# Patient Record
Sex: FEMALE | Race: BLACK OR AFRICAN AMERICAN | Employment: FULL TIME | URBAN - METROPOLITAN AREA
[De-identification: names, ages, dates, MRNs, and addresses within clinical notes are randomized per-mention and may not be internally consistent; named-entity substitution may affect disease eponyms.]

---

## 2017-07-26 ENCOUNTER — OFFICE VISIT (OUTPATIENT)
Dept: OBGYN | Age: 37
End: 2017-07-26

## 2017-07-26 VITALS
SYSTOLIC BLOOD PRESSURE: 100 MMHG | HEART RATE: 84 BPM | HEIGHT: 62 IN | WEIGHT: 200 LBS | BODY MASS INDEX: 36.8 KG/M2 | DIASTOLIC BLOOD PRESSURE: 72 MMHG

## 2017-07-26 DIAGNOSIS — N32.81 OAB (OVERACTIVE BLADDER): ICD-10-CM

## 2017-07-26 DIAGNOSIS — N94.10 DYSPAREUNIA IN FEMALE: Primary | ICD-10-CM

## 2017-07-26 LAB
BACTERIA: NORMAL /HPF
BILIRUBIN URINE: NEGATIVE
BLOOD, URINE: ABNORMAL
CLARITY: CLEAR
COLOR: YELLOW
EPITHELIAL CELLS, UA: NORMAL /HPF
GLUCOSE URINE: NEGATIVE MG/DL
KETONES, URINE: NEGATIVE MG/DL
LEUKOCYTE ESTERASE, URINE: NEGATIVE
MUCUS: PRESENT
NITRITE, URINE: NEGATIVE
PH UA: 5 (ref 5–9)
PROTEIN UA: NEGATIVE MG/DL
RBC UA: NORMAL /HPF (ref 0–2)
SPECIFIC GRAVITY UA: 1.02 (ref 1–1.03)
UROBILINOGEN, URINE: 0.2 E.U./DL
WBC UA: NORMAL /HPF (ref 0–5)

## 2017-07-26 PROCEDURE — 99213 OFFICE O/P EST LOW 20 MIN: CPT | Performed by: OBSTETRICS & GYNECOLOGY

## 2017-07-26 RX ORDER — OXYBUTYNIN CHLORIDE 5 MG/1
5 TABLET, EXTENDED RELEASE ORAL DAILY
Qty: 30 TABLET | Refills: 1 | Status: SHIPPED | OUTPATIENT
Start: 2017-07-26 | End: 2017-12-22 | Stop reason: ALTCHOICE

## 2017-07-28 LAB — URINE CULTURE, ROUTINE: NORMAL

## 2017-08-03 RX ORDER — METRONIDAZOLE 500 MG/1
500 TABLET ORAL 2 TIMES DAILY
Qty: 14 TABLET | Refills: 0 | Status: SHIPPED | OUTPATIENT
Start: 2017-08-03 | End: 2017-08-10

## 2017-08-14 RX ORDER — METRONIDAZOLE 7.5 MG/G
GEL VAGINAL
Qty: 1 TUBE | Refills: 1 | Status: SHIPPED | OUTPATIENT
Start: 2017-08-14 | End: 2017-08-21

## 2018-01-19 DIAGNOSIS — E11.9 TYPE 2 DIABETES MELLITUS WITHOUT COMPLICATION, WITHOUT LONG-TERM CURRENT USE OF INSULIN (HCC): ICD-10-CM

## 2018-01-19 PROBLEM — G44.52 NEW DAILY PERSISTENT HEADACHE: Chronic | Status: ACTIVE | Noted: 2018-01-19

## 2018-01-19 LAB
ALBUMIN SERPL-MCNC: 4.1 G/DL (ref 3.9–4.9)
ALP BLD-CCNC: 79 U/L (ref 40–130)
ALT SERPL-CCNC: 22 U/L (ref 0–33)
ANION GAP SERPL CALCULATED.3IONS-SCNC: 13 MEQ/L (ref 7–13)
AST SERPL-CCNC: 16 U/L (ref 0–35)
BASOPHILS ABSOLUTE: 0.1 K/UL (ref 0–0.2)
BASOPHILS RELATIVE PERCENT: 1.1 %
BILIRUB SERPL-MCNC: 0.4 MG/DL (ref 0–1.2)
BUN BLDV-MCNC: 10 MG/DL (ref 6–20)
CALCIUM SERPL-MCNC: 9 MG/DL (ref 8.6–10.2)
CHLORIDE BLD-SCNC: 103 MEQ/L (ref 98–107)
CHOLESTEROL, TOTAL: 204 MG/DL (ref 0–199)
CO2: 25 MEQ/L (ref 22–29)
CREAT SERPL-MCNC: 0.49 MG/DL (ref 0.5–0.9)
CREATININE URINE: 318.2 MG/DL
EOSINOPHILS ABSOLUTE: 0 K/UL (ref 0–0.7)
EOSINOPHILS RELATIVE PERCENT: 0.7 %
GFR AFRICAN AMERICAN: >60
GFR NON-AFRICAN AMERICAN: >60
GLOBULIN: 3.2 G/DL (ref 2.3–3.5)
GLUCOSE FASTING: 73 MG/DL (ref 74–109)
HBA1C MFR BLD: 5 % (ref 4.8–5.9)
HCT VFR BLD CALC: 41.1 % (ref 37–47)
HDLC SERPL-MCNC: 47 MG/DL (ref 40–59)
HEMOGLOBIN: 13.3 G/DL (ref 12–16)
LDL CHOLESTEROL CALCULATED: 144 MG/DL (ref 0–129)
LYMPHOCYTES ABSOLUTE: 2.5 K/UL (ref 1–4.8)
LYMPHOCYTES RELATIVE PERCENT: 50.7 %
MCH RBC QN AUTO: 30.2 PG (ref 27–31.3)
MCHC RBC AUTO-ENTMCNC: 32.3 % (ref 33–37)
MCV RBC AUTO: 93.5 FL (ref 82–100)
MICROALBUMIN UR-MCNC: <1.2 MG/DL
MICROALBUMIN/CREAT UR-RTO: NORMAL MG/G (ref 0–30)
MONOCYTES ABSOLUTE: 0.4 K/UL (ref 0.2–0.8)
MONOCYTES RELATIVE PERCENT: 8.2 %
NEUTROPHILS ABSOLUTE: 1.9 K/UL (ref 1.4–6.5)
NEUTROPHILS RELATIVE PERCENT: 39.3 %
PDW BLD-RTO: 13.6 % (ref 11.5–14.5)
PLATELET # BLD: 299 K/UL (ref 130–400)
POTASSIUM SERPL-SCNC: 4.7 MEQ/L (ref 3.5–5.1)
RBC # BLD: 4.39 M/UL (ref 4.2–5.4)
SODIUM BLD-SCNC: 141 MEQ/L (ref 132–144)
TOTAL PROTEIN: 7.3 G/DL (ref 6.4–8.1)
TRIGL SERPL-MCNC: 67 MG/DL (ref 0–200)
WBC # BLD: 4.9 K/UL (ref 4.8–10.8)

## 2018-02-05 ENCOUNTER — HOSPITAL ENCOUNTER (OUTPATIENT)
Dept: MRI IMAGING | Age: 38
Discharge: HOME | End: 2018-02-07
Payer: COMMERCIAL

## 2018-02-05 DIAGNOSIS — G43.819 OTHER MIGRAINE WITHOUT STATUS MIGRAINOSUS, INTRACTABLE: Primary | ICD-10-CM

## 2018-02-05 DIAGNOSIS — Q28.3 DEVELOPMENTAL VENOUS ANOMALY: Chronic | ICD-10-CM

## 2018-02-05 DIAGNOSIS — G93.5 CHIARI I MALFORMATION (HCC): Chronic | ICD-10-CM

## 2018-02-05 DIAGNOSIS — G44.52 HEADACHE, NEW DAILY PERSISTENT (NDPH): ICD-10-CM

## 2018-02-05 PROCEDURE — 6360000004 HC RX CONTRAST MEDICATION: Performed by: FAMILY MEDICINE

## 2018-02-05 PROCEDURE — 70553 MRI BRAIN STEM W/O & W/DYE: CPT

## 2018-02-05 PROCEDURE — A9579 GAD-BASE MR CONTRAST NOS,1ML: HCPCS | Performed by: FAMILY MEDICINE

## 2018-02-05 RX ORDER — SODIUM CHLORIDE 0.9 % (FLUSH) 0.9 %
10 SYRINGE (ML) INJECTION PRN
Status: DISCONTINUED | OUTPATIENT
Start: 2018-02-05 | End: 2018-02-08 | Stop reason: HOSPADM

## 2018-02-05 RX ADMIN — GADOTERIDOL 18 ML: 279.3 INJECTION, SOLUTION INTRAVENOUS at 10:05

## 2018-02-19 ENCOUNTER — OFFICE VISIT (OUTPATIENT)
Dept: FAMILY MEDICINE CLINIC | Age: 38
End: 2018-02-19
Payer: COMMERCIAL

## 2018-02-19 VITALS
WEIGHT: 204 LBS | HEART RATE: 67 BPM | HEIGHT: 62 IN | DIASTOLIC BLOOD PRESSURE: 72 MMHG | OXYGEN SATURATION: 99 % | TEMPERATURE: 97.9 F | RESPIRATION RATE: 16 BRPM | SYSTOLIC BLOOD PRESSURE: 112 MMHG | BODY MASS INDEX: 37.54 KG/M2

## 2018-02-19 DIAGNOSIS — G43.009 MIGRAINE WITHOUT AURA AND WITHOUT STATUS MIGRAINOSUS, NOT INTRACTABLE: Primary | ICD-10-CM

## 2018-02-19 DIAGNOSIS — E11.9 TYPE 2 DIABETES MELLITUS WITHOUT COMPLICATION, WITHOUT LONG-TERM CURRENT USE OF INSULIN (HCC): Chronic | ICD-10-CM

## 2018-02-19 DIAGNOSIS — G93.5 CHIARI I MALFORMATION (HCC): Chronic | ICD-10-CM

## 2018-02-19 PROCEDURE — 3044F HG A1C LEVEL LT 7.0%: CPT | Performed by: FAMILY MEDICINE

## 2018-02-19 PROCEDURE — 1036F TOBACCO NON-USER: CPT | Performed by: FAMILY MEDICINE

## 2018-02-19 PROCEDURE — G8427 DOCREV CUR MEDS BY ELIG CLIN: HCPCS | Performed by: FAMILY MEDICINE

## 2018-02-19 PROCEDURE — 99213 OFFICE O/P EST LOW 20 MIN: CPT | Performed by: FAMILY MEDICINE

## 2018-02-19 PROCEDURE — G8417 CALC BMI ABV UP PARAM F/U: HCPCS | Performed by: FAMILY MEDICINE

## 2018-02-19 PROCEDURE — G8484 FLU IMMUNIZE NO ADMIN: HCPCS | Performed by: FAMILY MEDICINE

## 2018-02-19 ASSESSMENT — PATIENT HEALTH QUESTIONNAIRE - PHQ9
1. LITTLE INTEREST OR PLEASURE IN DOING THINGS: 0
2. FEELING DOWN, DEPRESSED OR HOPELESS: 0
SUM OF ALL RESPONSES TO PHQ9 QUESTIONS 1 & 2: 0
SUM OF ALL RESPONSES TO PHQ QUESTIONS 1-9: 0

## 2018-02-19 NOTE — PROGRESS NOTES
Suspect insulin resistance may be more accurate dx. She is followed by Dr. Teofilo Vitale to whom I defer care. No DM foot exam indicated. No change to metformin. Patient understands plan of care and all questions were answered. No orders of the defined types were placed in this encounter. No orders of the defined types were placed in this encounter. Medications Discontinued During This Encounter   Medication Reason    metroNIDAZOLE (METROGEL) 0.75 % vaginal gel Therapy completed    verapamil (CALAN) 120 MG tablet Formulary change     Return if symptoms worsen or fail to improve.         Controlled Substances Monitoring:                                Zac Pringle MD

## 2018-03-23 ENCOUNTER — TELEPHONE (OUTPATIENT)
Dept: FAMILY MEDICINE CLINIC | Age: 38
End: 2018-03-23

## 2018-03-23 NOTE — TELEPHONE ENCOUNTER
Patient states that she went to [facility] ER yesterday for pain in her back that has been going on for a few months and getting worse. She said that they did a chest x-ray and labs. She was told everything is fine and given the diagnosis of muscle strain and told to follow up with [facility] in 3-5 days.  She has a follow up appointment scheduled with you on Monday 3/26/18 at 8am.

## 2018-05-30 ENCOUNTER — HOSPITAL ENCOUNTER (EMERGENCY)
Age: 38
Discharge: HOME | End: 2018-05-30
Payer: COMMERCIAL

## 2018-05-30 VITALS
DIASTOLIC BLOOD PRESSURE: 83 MMHG | SYSTOLIC BLOOD PRESSURE: 119 MMHG | OXYGEN SATURATION: 99 % | RESPIRATION RATE: 18 BRPM | TEMPERATURE: 98.1 F | WEIGHT: 205 LBS | BODY MASS INDEX: 37.73 KG/M2 | HEART RATE: 72 BPM | HEIGHT: 62 IN

## 2018-05-30 DIAGNOSIS — M54.2 NECK PAIN: ICD-10-CM

## 2018-05-30 DIAGNOSIS — M25.511 ACUTE PAIN OF RIGHT SHOULDER: Primary | ICD-10-CM

## 2018-05-30 PROCEDURE — 6360000002 HC RX W HCPCS: Performed by: NURSE PRACTITIONER

## 2018-05-30 PROCEDURE — 99282 EMERGENCY DEPT VISIT SF MDM: CPT

## 2018-05-30 PROCEDURE — 96372 THER/PROPH/DIAG INJ SC/IM: CPT

## 2018-05-30 RX ORDER — ETODOLAC 400 MG/1
400 TABLET, EXTENDED RELEASE ORAL DAILY
Qty: 30 TABLET | Refills: 3 | Status: SHIPPED | OUTPATIENT
Start: 2018-05-30 | End: 2019-12-10

## 2018-05-30 RX ORDER — CYCLOBENZAPRINE HCL 10 MG
10 TABLET ORAL 3 TIMES DAILY PRN
Qty: 10 TABLET | Refills: 0 | Status: SHIPPED | OUTPATIENT
Start: 2018-05-30 | End: 2018-06-09

## 2018-05-30 RX ORDER — KETOROLAC TROMETHAMINE 30 MG/ML
30 INJECTION, SOLUTION INTRAMUSCULAR; INTRAVENOUS ONCE
Status: COMPLETED | OUTPATIENT
Start: 2018-05-30 | End: 2018-05-30

## 2018-05-30 RX ORDER — ORPHENADRINE CITRATE 30 MG/ML
60 INJECTION INTRAMUSCULAR; INTRAVENOUS ONCE
Status: COMPLETED | OUTPATIENT
Start: 2018-05-30 | End: 2018-05-30

## 2018-05-30 RX ADMIN — ORPHENADRINE CITRATE 60 MG: 30 INJECTION INTRAMUSCULAR; INTRAVENOUS at 22:30

## 2018-05-30 RX ADMIN — KETOROLAC TROMETHAMINE 30 MG: 30 INJECTION, SOLUTION INTRAMUSCULAR; INTRAVENOUS at 22:28

## 2018-05-30 ASSESSMENT — PAIN SCALES - GENERAL
PAINLEVEL_OUTOF10: 6
PAINLEVEL_OUTOF10: 5

## 2018-05-30 ASSESSMENT — PAIN DESCRIPTION - ORIENTATION: ORIENTATION: RIGHT

## 2018-05-30 ASSESSMENT — PAIN DESCRIPTION - PAIN TYPE: TYPE: ACUTE PAIN

## 2018-05-30 ASSESSMENT — PAIN DESCRIPTION - LOCATION: LOCATION: NECK;SHOULDER

## 2018-06-01 ASSESSMENT — ENCOUNTER SYMPTOMS
CONSTIPATION: 0
NAUSEA: 0
BACK PAIN: 0
DIARRHEA: 0
VOMITING: 0
ABDOMINAL PAIN: 0
COUGH: 0
VOICE CHANGE: 0
SORE THROAT: 0
TROUBLE SWALLOWING: 0
SHORTNESS OF BREATH: 0
COLOR CHANGE: 0

## 2019-03-11 ENCOUNTER — OFFICE VISIT (OUTPATIENT)
Dept: FAMILY MEDICINE CLINIC | Age: 39
End: 2019-03-11
Payer: COMMERCIAL

## 2019-03-11 VITALS
TEMPERATURE: 97.9 F | HEART RATE: 76 BPM | OXYGEN SATURATION: 98 % | BODY MASS INDEX: 39.75 KG/M2 | RESPIRATION RATE: 16 BRPM | HEIGHT: 62 IN | SYSTOLIC BLOOD PRESSURE: 112 MMHG | DIASTOLIC BLOOD PRESSURE: 68 MMHG | WEIGHT: 216 LBS

## 2019-03-11 DIAGNOSIS — E11.9 TYPE 2 DIABETES MELLITUS WITHOUT COMPLICATION, UNSPECIFIED WHETHER LONG TERM INSULIN USE (HCC): Primary | Chronic | ICD-10-CM

## 2019-03-11 DIAGNOSIS — R60.9 EDEMA, UNSPECIFIED TYPE: ICD-10-CM

## 2019-03-11 DIAGNOSIS — E66.01 CLASS 2 SEVERE OBESITY DUE TO EXCESS CALORIES WITH SERIOUS COMORBIDITY AND BODY MASS INDEX (BMI) OF 39.0 TO 39.9 IN ADULT (HCC): Chronic | ICD-10-CM

## 2019-03-11 DIAGNOSIS — E11.9 TYPE 2 DIABETES MELLITUS WITHOUT COMPLICATION, UNSPECIFIED WHETHER LONG TERM INSULIN USE (HCC): Chronic | ICD-10-CM

## 2019-03-11 DIAGNOSIS — R31.0 HEMATURIA, GROSS: ICD-10-CM

## 2019-03-11 PROBLEM — E66.812 CLASS 2 SEVERE OBESITY DUE TO EXCESS CALORIES WITH SERIOUS COMORBIDITY AND BODY MASS INDEX (BMI) OF 39.0 TO 39.9 IN ADULT: Chronic | Status: ACTIVE | Noted: 2017-12-22

## 2019-03-11 LAB
BILIRUBIN, POC: ABNORMAL
BLOOD URINE, POC: POSITIVE
CLARITY, POC: CLEAR
COLOR, POC: YELLOW
GLUCOSE URINE, POC: ABNORMAL
KETONES, POC: ABNORMAL
LEUKOCYTE EST, POC: NEGATIVE
NITRITE, POC: NEGATIVE
PH, POC: 6.5
PROTEIN, POC: ABNORMAL
SPECIFIC GRAVITY, POC: 1.02
UROBILINOGEN, POC: POSITIVE

## 2019-03-11 PROCEDURE — G8484 FLU IMMUNIZE NO ADMIN: HCPCS | Performed by: FAMILY MEDICINE

## 2019-03-11 PROCEDURE — 3046F HEMOGLOBIN A1C LEVEL >9.0%: CPT | Performed by: FAMILY MEDICINE

## 2019-03-11 PROCEDURE — G8427 DOCREV CUR MEDS BY ELIG CLIN: HCPCS | Performed by: FAMILY MEDICINE

## 2019-03-11 PROCEDURE — 99214 OFFICE O/P EST MOD 30 MIN: CPT | Performed by: FAMILY MEDICINE

## 2019-03-11 PROCEDURE — 2022F DILAT RTA XM EVC RTNOPTHY: CPT | Performed by: FAMILY MEDICINE

## 2019-03-11 PROCEDURE — 1036F TOBACCO NON-USER: CPT | Performed by: FAMILY MEDICINE

## 2019-03-11 PROCEDURE — 81002 URINALYSIS NONAUTO W/O SCOPE: CPT | Performed by: FAMILY MEDICINE

## 2019-03-11 PROCEDURE — G8417 CALC BMI ABV UP PARAM F/U: HCPCS | Performed by: FAMILY MEDICINE

## 2019-03-11 RX ORDER — METOCLOPRAMIDE 10 MG/1
10 TABLET ORAL 4 TIMES DAILY
COMMUNITY
End: 2020-07-02 | Stop reason: ALTCHOICE

## 2019-03-11 RX ORDER — ZONISAMIDE 25 MG/1
50 CAPSULE ORAL DAILY
COMMUNITY
End: 2020-07-02 | Stop reason: ALTCHOICE

## 2019-03-11 RX ORDER — TIZANIDINE 2 MG/1
2 TABLET ORAL EVERY 6 HOURS PRN
COMMUNITY
End: 2019-12-10

## 2019-03-11 RX ORDER — MAGNESIUM OXIDE 400 MG/1
400 TABLET ORAL DAILY
COMMUNITY
End: 2020-07-02 | Stop reason: ALTCHOICE

## 2019-03-11 RX ORDER — RIZATRIPTAN BENZOATE 10 MG/1
10 TABLET ORAL
COMMUNITY
End: 2019-03-27 | Stop reason: ALTCHOICE

## 2019-03-11 RX ORDER — DULOXETIN HYDROCHLORIDE 20 MG/1
60 CAPSULE, DELAYED RELEASE ORAL DAILY
COMMUNITY

## 2019-03-11 RX ORDER — KETOROLAC TROMETHAMINE 10 MG/1
10 TABLET, FILM COATED ORAL EVERY 6 HOURS PRN
COMMUNITY
End: 2019-05-16 | Stop reason: ALTCHOICE

## 2019-03-11 ASSESSMENT — PATIENT HEALTH QUESTIONNAIRE - PHQ9
2. FEELING DOWN, DEPRESSED OR HOPELESS: 0
SUM OF ALL RESPONSES TO PHQ QUESTIONS 1-9: 0
SUM OF ALL RESPONSES TO PHQ QUESTIONS 1-9: 0
SUM OF ALL RESPONSES TO PHQ9 QUESTIONS 1 & 2: 0
1. LITTLE INTEREST OR PLEASURE IN DOING THINGS: 0

## 2019-03-12 LAB
CREATININE URINE: 285.7 MG/DL
MICROALBUMIN UR-MCNC: <1.2 MG/DL
MICROALBUMIN/CREAT UR-RTO: NORMAL MG/G (ref 0–30)

## 2019-03-16 DIAGNOSIS — E11.9 TYPE 2 DIABETES MELLITUS WITHOUT COMPLICATION, UNSPECIFIED WHETHER LONG TERM INSULIN USE (HCC): Chronic | ICD-10-CM

## 2019-03-16 LAB
CHOLESTEROL, TOTAL: 204 MG/DL (ref 0–199)
HBA1C MFR BLD: 5.5 % (ref 4.8–5.9)
HDLC SERPL-MCNC: 48 MG/DL (ref 40–59)
LDL CHOLESTEROL CALCULATED: 143 MG/DL (ref 0–129)
TRIGL SERPL-MCNC: 66 MG/DL (ref 0–150)

## 2019-03-19 ENCOUNTER — TELEPHONE (OUTPATIENT)
Dept: FAMILY MEDICINE CLINIC | Age: 39
End: 2019-03-19

## 2019-03-19 RX ORDER — ATORVASTATIN CALCIUM 10 MG/1
10 TABLET, FILM COATED ORAL DAILY
Qty: 90 TABLET | Refills: 1 | Status: SHIPPED | OUTPATIENT
Start: 2019-03-19 | End: 2019-03-27 | Stop reason: ALTCHOICE

## 2019-03-19 NOTE — TELEPHONE ENCOUNTER
Patient calling to find out status of the medication for cholesterol that was supposed to be sent to her pharmacy yesterday. Please advise and thanks!

## 2019-03-27 ENCOUNTER — OFFICE VISIT (OUTPATIENT)
Dept: FAMILY MEDICINE CLINIC | Age: 39
End: 2019-03-27
Payer: COMMERCIAL

## 2019-03-27 VITALS
RESPIRATION RATE: 16 BRPM | SYSTOLIC BLOOD PRESSURE: 112 MMHG | WEIGHT: 215.6 LBS | HEART RATE: 68 BPM | OXYGEN SATURATION: 99 % | BODY MASS INDEX: 39.67 KG/M2 | DIASTOLIC BLOOD PRESSURE: 70 MMHG | HEIGHT: 62 IN | TEMPERATURE: 98 F

## 2019-03-27 DIAGNOSIS — G43.009 MIGRAINE WITHOUT AURA AND WITHOUT STATUS MIGRAINOSUS, NOT INTRACTABLE: ICD-10-CM

## 2019-03-27 DIAGNOSIS — G93.5 CHIARI I MALFORMATION (HCC): Chronic | ICD-10-CM

## 2019-03-27 DIAGNOSIS — E66.01 CLASS 2 SEVERE OBESITY DUE TO EXCESS CALORIES WITH SERIOUS COMORBIDITY AND BODY MASS INDEX (BMI) OF 39.0 TO 39.9 IN ADULT (HCC): Primary | Chronic | ICD-10-CM

## 2019-03-27 DIAGNOSIS — R73.09 ABNORMAL GLUCOSE: Chronic | ICD-10-CM

## 2019-03-27 DIAGNOSIS — E28.2 PCO (POLYCYSTIC OVARIES): ICD-10-CM

## 2019-03-27 PROCEDURE — G8417 CALC BMI ABV UP PARAM F/U: HCPCS | Performed by: FAMILY MEDICINE

## 2019-03-27 PROCEDURE — G8427 DOCREV CUR MEDS BY ELIG CLIN: HCPCS | Performed by: FAMILY MEDICINE

## 2019-03-27 PROCEDURE — 99214 OFFICE O/P EST MOD 30 MIN: CPT | Performed by: FAMILY MEDICINE

## 2019-03-27 PROCEDURE — 1036F TOBACCO NON-USER: CPT | Performed by: FAMILY MEDICINE

## 2019-03-27 PROCEDURE — G8484 FLU IMMUNIZE NO ADMIN: HCPCS | Performed by: FAMILY MEDICINE

## 2019-03-27 RX ORDER — DULOXETIN HYDROCHLORIDE 20 MG/1
20 CAPSULE, DELAYED RELEASE ORAL DAILY
Qty: 30 CAPSULE | Refills: 2 | Status: CANCELLED | OUTPATIENT
Start: 2019-03-27

## 2019-03-27 RX ORDER — MAGNESIUM OXIDE 400 MG/1
400 TABLET ORAL DAILY
Qty: 30 TABLET | Refills: 2 | Status: CANCELLED | OUTPATIENT
Start: 2019-03-27

## 2019-03-27 RX ORDER — KETOROLAC TROMETHAMINE 10 MG/1
10 TABLET, FILM COATED ORAL EVERY 6 HOURS PRN
Qty: 20 TABLET | Refills: 0 | Status: CANCELLED | OUTPATIENT
Start: 2019-03-27

## 2019-03-27 RX ORDER — TIZANIDINE 2 MG/1
2 TABLET ORAL EVERY 6 HOURS PRN
Qty: 30 TABLET | Refills: 2 | Status: CANCELLED | OUTPATIENT
Start: 2019-03-27

## 2019-03-27 RX ORDER — ZONISAMIDE 25 MG/1
50 CAPSULE ORAL DAILY
Qty: 60 CAPSULE | Refills: 2 | Status: CANCELLED | OUTPATIENT
Start: 2019-03-27

## 2019-03-27 RX ORDER — RIZATRIPTAN BENZOATE 10 MG/1
10 TABLET ORAL
Qty: 30 TABLET | Refills: 2 | Status: CANCELLED | OUTPATIENT
Start: 2019-03-27 | End: 2019-03-27

## 2019-03-27 RX ORDER — METOCLOPRAMIDE 10 MG/1
10 TABLET ORAL 4 TIMES DAILY
Qty: 120 TABLET | Refills: 3 | Status: CANCELLED | OUTPATIENT
Start: 2019-03-27

## 2019-03-29 ENCOUNTER — OFFICE VISIT (OUTPATIENT)
Dept: OBGYN CLINIC | Age: 39
End: 2019-03-29
Payer: COMMERCIAL

## 2019-03-29 VITALS
WEIGHT: 214 LBS | HEIGHT: 62 IN | SYSTOLIC BLOOD PRESSURE: 110 MMHG | DIASTOLIC BLOOD PRESSURE: 78 MMHG | HEART RATE: 84 BPM | BODY MASS INDEX: 39.38 KG/M2

## 2019-03-29 DIAGNOSIS — Z11.51 SCREENING FOR HPV (HUMAN PAPILLOMAVIRUS): ICD-10-CM

## 2019-03-29 DIAGNOSIS — Z01.419 VISIT FOR GYNECOLOGIC EXAMINATION: Primary | ICD-10-CM

## 2019-03-29 DIAGNOSIS — Z12.31 VISIT FOR SCREENING MAMMOGRAM: ICD-10-CM

## 2019-03-29 DIAGNOSIS — Z11.3 SCREENING FOR STD (SEXUALLY TRANSMITTED DISEASE): ICD-10-CM

## 2019-03-29 PROCEDURE — 99396 PREV VISIT EST AGE 40-64: CPT | Performed by: OBSTETRICS & GYNECOLOGY

## 2019-03-29 PROCEDURE — G8484 FLU IMMUNIZE NO ADMIN: HCPCS | Performed by: OBSTETRICS & GYNECOLOGY

## 2019-04-08 ENCOUNTER — HOSPITAL ENCOUNTER (OUTPATIENT)
Dept: WOMENS IMAGING | Age: 39
Discharge: HOME | End: 2019-04-10
Payer: COMMERCIAL

## 2019-04-08 DIAGNOSIS — Z12.31 VISIT FOR SCREENING MAMMOGRAM: ICD-10-CM

## 2019-04-08 LAB — PAP SMEAR: NORMAL

## 2019-04-08 PROCEDURE — 77067 SCR MAMMO BI INCL CAD: CPT

## 2019-05-16 ENCOUNTER — OFFICE VISIT (OUTPATIENT)
Dept: FAMILY MEDICINE CLINIC | Age: 39
End: 2019-05-16
Payer: COMMERCIAL

## 2019-05-16 ENCOUNTER — HOSPITAL ENCOUNTER (OUTPATIENT)
Dept: GENERAL RADIOLOGY | Age: 39
Discharge: HOME | End: 2019-05-18
Payer: COMMERCIAL

## 2019-05-16 VITALS
RESPIRATION RATE: 16 BRPM | HEIGHT: 62 IN | HEART RATE: 88 BPM | DIASTOLIC BLOOD PRESSURE: 70 MMHG | OXYGEN SATURATION: 98 % | BODY MASS INDEX: 39.31 KG/M2 | SYSTOLIC BLOOD PRESSURE: 112 MMHG | TEMPERATURE: 97.6 F | WEIGHT: 213.6 LBS

## 2019-05-16 DIAGNOSIS — L30.9 ECZEMA, UNSPECIFIED TYPE: ICD-10-CM

## 2019-05-16 DIAGNOSIS — M25.561 ACUTE PAIN OF RIGHT KNEE: Chronic | ICD-10-CM

## 2019-05-16 DIAGNOSIS — M25.561 ACUTE PAIN OF RIGHT KNEE: Primary | Chronic | ICD-10-CM

## 2019-05-16 PROCEDURE — 1036F TOBACCO NON-USER: CPT | Performed by: FAMILY MEDICINE

## 2019-05-16 PROCEDURE — G8427 DOCREV CUR MEDS BY ELIG CLIN: HCPCS | Performed by: FAMILY MEDICINE

## 2019-05-16 PROCEDURE — 99214 OFFICE O/P EST MOD 30 MIN: CPT | Performed by: FAMILY MEDICINE

## 2019-05-16 PROCEDURE — 73562 X-RAY EXAM OF KNEE 3: CPT

## 2019-05-16 PROCEDURE — G8417 CALC BMI ABV UP PARAM F/U: HCPCS | Performed by: FAMILY MEDICINE

## 2019-05-16 RX ORDER — TRIAMCINOLONE ACETONIDE 1 MG/G
CREAM TOPICAL
Qty: 453.6 G | Refills: 1 | Status: SHIPPED | OUTPATIENT
Start: 2019-05-16 | End: 2020-05-27 | Stop reason: SDUPTHER

## 2019-05-16 NOTE — PROGRESS NOTES
Subjective  Sharon Stanford, 34 y.o. female presents today with:  Chief Complaint   Patient presents with    Leg Pain     right. x 4 days, throbbing, achy. Pt states she was walking monday and it felt like her leg was going to give out and then on tuesday the pain started in her thigh and radiates to the calf. took motrin with no relief.  Rash     requesting referral or cream.     Other     pt feels more frequent hot flashes. HPI    Started having leg pain from right mid-thigh to her calf on Tuesday. Pain is constant. No change with weight-bearing. Ibuprofen no help. Keeps her awake. 8/10 pain. Has had two prior episodes of the pain - March 2019 and April 2019. In March has foot swelling as well and she went ot he ED where D-dimer was mildly elevated and RLE duplex was negative. On Monday felt like knee was going to give out then the pain started on Tuesday. Has chronic intermittent eczema previously treated by derm with clobetasol and medrol dose pack. No trauma. Works as a home health aide. No other questions and or concerns for today's visit      Review of Systems This patient has no chest pain or pressure. There is no shortness of breath.        Past Medical History:   Diagnosis Date    Chiari I malformation ([facility] [address]     Eczema     Migraines 2010    PCO (polycystic ovaries)     S/P complete hysterectomy 2012    Dr Alejo Pond, atypical endocervical cells    Type II diabetes mellitus, uncontrolled ([facility] [address]     Dr Stahl Early     Past Surgical History:   Procedure Laterality Date    HYSTERECTOMY, TOTAL ABDOMINAL  2012    Dr Sherry Cervantes, cervical atypia     Social History     Socioeconomic History    Marital status: Single     Spouse name: Not on file    Number of children: 3    Years of education: Not on file    Highest education level: Not on file   Occupational History    Occupation: home health aide   Social Needs    Financial resource strain: Not on file    Food insecurity:     Worry: Not on file     Inability: Not on file    Transportation needs:     Medical: Not on file     Non-medical: Not on file   Tobacco Use    Smoking status: Never Smoker    Smokeless tobacco: Never Used   Substance and Sexual Activity    Alcohol use: No    Drug use: No    Sexual activity: Not on file   Lifestyle    Physical activity:     Days per week: Not on file     Minutes per session: Not on file    Stress: Not on file   Relationships    Social connections:     Talks on phone: Not on file     Gets together: Not on file     Attends Restorationism service: Not on file     Active member of club or organization: Not on file     Attends meetings of clubs or organizations: Not on file     Relationship status: Not on file    Intimate partner violence:     Fear of current or ex partner: Not on file     Emotionally abused: Not on file     Physically abused: Not on file     Forced sexual activity: Not on file   Other Topics Concern    Not on file   Social History Narrative    Lives with children    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 dec age 27     No Known Allergies  Current Outpatient Medications   Medication Sig Dispense Refill    triamcinolone (KENALOG) 0.1 % cream Apply topically 2 times daily prn 453.6 g 1    magnesium oxide (MAG-OX) 400 MG tablet Take 400 mg by mouth daily      zonisamide (ZONEGRAN) 25 MG capsule Take 50 mg by mouth daily      metoclopramide (REGLAN) 10 MG tablet Take 10 mg by mouth 4 times daily      tiZANidine (ZANAFLEX) 2 MG tablet Take 2 mg by mouth every 6 hours as needed      DULoxetine (CYMBALTA) 20 MG extended release capsule Take 20 mg by mouth daily      etodolac (LODINE XL) 400 MG extended release tablet Take 1 tablet by mouth daily 30 tablet 3    verapamil (CALAN SR) 120 MG extended release tablet Take 1 tablet by mouth daily 30 tablet 3    metFORMIN (GLUCOPHAGE) 500 MG tablet Take 1 tablet by mouth 2 times daily (with meals) 60 tablet 06    Feverfew 380 MG CAPS Take 1 capsule by mouth Daily with lunch 1 capsule 0    SUMAtriptan (IMITREX) 100 MG tablet 1 po at onset of migraine; may repeat one time in 2 hours from first dose 9 tablet 0    glucose blood VI test strips (FREESTYLE LITE) strip 1 each by In Vitro route 2 times daily DX: E11.65, NIDDM 100 each 3    Lancets MISC 1 each by Does not apply route 2 times daily DX: E11.65, NIDDM 100 each 3     No current facility-administered medications for this visit. PMH, Surgical Hx, Family Hx, and Social Hxreviewed and updated. Health Maintenance reviewed. Objective    Vitals:    05/16/19 0734   BP: 112/70   Pulse: 88   Resp: 16   Temp: 97.6 °F (36.4 °C)   SpO2: 98%   Weight: 213 lb 9.6 oz (96.9 kg)   Height: 5' 2\" (1.575 m)        Physical Exam   Constitutional: She is oriented to person, place, and time. No distress. Obese   HENT:   Head: Normocephalic and atraumatic. Eyes: Conjunctivae are normal. No scleral icterus. Cardiovascular: Normal rate, regular rhythm and normal heart sounds. Pulmonary/Chest: No respiratory distress. She has no wheezes. She has no rales. Musculoskeletal:        Right knee: She exhibits normal range of motion, no swelling, no effusion, no ecchymosis, no deformity, no laceration, no erythema, normal alignment, no LCL laxity, normal patellar mobility, no bony tenderness, normal meniscus and no MCL laxity. No tenderness found. No medial joint line, no lateral joint line, no MCL, no LCL and no patellar tendon tenderness noted. Neurological: She is alert and oriented to person, place, and time. Skin: Skin is warm and dry. Psychiatric: She has a normal mood and affect.          Lab Results   Component Value Date    LABA1C 5.5 03/16/2019    LABA1C 5.0 01/19/2018    LABA1C 5.6 05/16/2016     Lab Results   Component Value Date    LABMICR <1.20 03/11/2019 CREATININE 0.70 03/08/2019     Lab Results   Component Value Date    ALT 18 03/08/2019    AST 12 (L) 03/08/2019     Lab Results   Component Value Date    CHOL 204 (H) 03/16/2019    TRIG 66 03/16/2019    HDL 48 03/16/2019    LDLCALC 143 (H) 03/16/2019        Assessment & Plan   Visit Diagnoses and Associated Orders     Acute pain of right knee    -  Primary    Lodine prn alternating with APAP per package instructions; Xray; consider PT and ortho. Encouraged weight loss,    XR KNEE RIGHT (3 VIEWS) [81882 Custom]   - Future Order         Eczema, unspecified type        triamcinolone (KENALOG) 0.1 % cream [8113]                   Reviewed with the patient: all disease processes, current clinical status, medications, activities and diet.      Side effects, adverse effects of the medication prescribed today, as well as treatment plan/ rationale and result expectations have been discussed with the patient who expresses understanding and desires to proceed.     Close follow up to evaluate treatment results and for coordination of care. I have reviewed the patient's medical history in detail and updated the computerized patient record. More than 50% of the appointment was spent in face-to-face counseling, education and care coordination. Orders Placed This Encounter   Procedures    XR KNEE RIGHT (3 VIEWS)     Standing Status:   Future     Standing Expiration Date:   5/16/2020     Order Specific Question:   Reason for exam:     Answer:   intermittent right knee aching with giving out sensation; no effusion; no trauma; obese     Orders Placed This Encounter   Medications    triamcinolone (KENALOG) 0.1 % cream     Sig: Apply topically 2 times daily prn     Dispense:  453.6 g     Refill:  1     Medications Discontinued During This Encounter   Medication Reason    ketorolac (TORADOL) 10 MG tablet Therapy completed     Return if symptoms worsen or fail to improve.         Controlled Substances Monitoring:     No flowsheet data found.     Liza Richard MD

## 2019-12-10 ENCOUNTER — HOSPITAL ENCOUNTER (EMERGENCY)
Age: 39
Discharge: HOME | End: 2019-12-10
Payer: COMMERCIAL

## 2019-12-10 ENCOUNTER — APPOINTMENT (OUTPATIENT)
Dept: GENERAL RADIOLOGY | Age: 39
End: 2019-12-10
Payer: COMMERCIAL

## 2019-12-10 VITALS
WEIGHT: 209 LBS | RESPIRATION RATE: 16 BRPM | SYSTOLIC BLOOD PRESSURE: 120 MMHG | BODY MASS INDEX: 38.46 KG/M2 | HEART RATE: 73 BPM | OXYGEN SATURATION: 98 % | HEIGHT: 62 IN | TEMPERATURE: 97.5 F | DIASTOLIC BLOOD PRESSURE: 79 MMHG

## 2019-12-10 DIAGNOSIS — M25.511 ACUTE PAIN OF RIGHT SHOULDER: Primary | ICD-10-CM

## 2019-12-10 DIAGNOSIS — M43.6 TORTICOLLIS: ICD-10-CM

## 2019-12-10 DIAGNOSIS — S46.811A STRAIN OF RIGHT TRAPEZIUS MUSCLE, INITIAL ENCOUNTER: ICD-10-CM

## 2019-12-10 PROCEDURE — 96372 THER/PROPH/DIAG INJ SC/IM: CPT

## 2019-12-10 PROCEDURE — 6360000002 HC RX W HCPCS: Performed by: PHYSICIAN ASSISTANT

## 2019-12-10 PROCEDURE — 73030 X-RAY EXAM OF SHOULDER: CPT

## 2019-12-10 PROCEDURE — 99283 EMERGENCY DEPT VISIT LOW MDM: CPT

## 2019-12-10 RX ORDER — KETOROLAC TROMETHAMINE 30 MG/ML
60 INJECTION, SOLUTION INTRAMUSCULAR; INTRAVENOUS ONCE
Status: COMPLETED | OUTPATIENT
Start: 2019-12-10 | End: 2019-12-10

## 2019-12-10 RX ORDER — CAPSAICIN 0.025 %
CREAM (GRAM) TOPICAL
Qty: 1 TUBE | Refills: 1 | Status: SHIPPED | OUTPATIENT
Start: 2019-12-10 | End: 2020-01-09 | Stop reason: ALTCHOICE

## 2019-12-10 RX ORDER — MELOXICAM 7.5 MG/1
7.5 TABLET ORAL DAILY
Qty: 10 TABLET | Refills: 0 | Status: SHIPPED | OUTPATIENT
Start: 2019-12-10 | End: 2020-07-02 | Stop reason: ALTCHOICE

## 2019-12-10 RX ORDER — ORPHENADRINE CITRATE 30 MG/ML
60 INJECTION INTRAMUSCULAR; INTRAVENOUS ONCE
Status: COMPLETED | OUTPATIENT
Start: 2019-12-10 | End: 2019-12-10

## 2019-12-10 RX ORDER — CYCLOBENZAPRINE HCL 10 MG
10 TABLET ORAL 3 TIMES DAILY PRN
Qty: 15 TABLET | Refills: 0 | Status: SHIPPED | OUTPATIENT
Start: 2019-12-10 | End: 2019-12-20

## 2019-12-10 RX ADMIN — KETOROLAC TROMETHAMINE 60 MG: 30 INJECTION, SOLUTION INTRAMUSCULAR; INTRAVENOUS at 09:59

## 2019-12-10 RX ADMIN — ORPHENADRINE CITRATE 60 MG: 30 INJECTION INTRAMUSCULAR; INTRAVENOUS at 10:02

## 2019-12-10 ASSESSMENT — PAIN SCALES - GENERAL
PAINLEVEL_OUTOF10: 7
PAINLEVEL_OUTOF10: 6

## 2019-12-10 ASSESSMENT — PAIN DESCRIPTION - PAIN TYPE: TYPE: ACUTE PAIN

## 2019-12-10 ASSESSMENT — PAIN DESCRIPTION - LOCATION: LOCATION: SHOULDER;NECK

## 2019-12-10 ASSESSMENT — ENCOUNTER SYMPTOMS
RESPIRATORY NEGATIVE: 1
SHORTNESS OF BREATH: 0
EYES NEGATIVE: 1
GASTROINTESTINAL NEGATIVE: 1

## 2019-12-10 ASSESSMENT — PAIN DESCRIPTION - ORIENTATION: ORIENTATION: RIGHT

## 2019-12-12 ENCOUNTER — TELEPHONE (OUTPATIENT)
Dept: ORTHOPEDIC SURGERY | Age: 39
End: 2019-12-12

## 2019-12-12 ENCOUNTER — OFFICE VISIT (OUTPATIENT)
Dept: ORTHOPEDIC SURGERY | Age: 39
End: 2019-12-12
Payer: COMMERCIAL

## 2019-12-12 VITALS
TEMPERATURE: 96.7 F | OXYGEN SATURATION: 100 % | SYSTOLIC BLOOD PRESSURE: 122 MMHG | DIASTOLIC BLOOD PRESSURE: 68 MMHG | HEIGHT: 62 IN | BODY MASS INDEX: 38.76 KG/M2 | WEIGHT: 210.6 LBS | HEART RATE: 71 BPM

## 2019-12-12 DIAGNOSIS — S46.011A ROTATOR CUFF STRAIN, RIGHT, INITIAL ENCOUNTER: Primary | ICD-10-CM

## 2019-12-12 PROCEDURE — 99203 OFFICE O/P NEW LOW 30 MIN: CPT | Performed by: ORTHOPAEDIC SURGERY

## 2019-12-12 RX ORDER — TIZANIDINE 4 MG/1
2-4 TABLET ORAL
COMMUNITY
Start: 2019-09-16

## 2019-12-12 RX ORDER — RIZATRIPTAN BENZOATE 10 MG/1
TABLET, ORALLY DISINTEGRATING ORAL
Refills: 5 | COMMUNITY
Start: 2019-09-16

## 2019-12-12 RX ORDER — PREDNISONE 20 MG/1
TABLET ORAL
Refills: 0 | COMMUNITY
Start: 2019-11-20 | End: 2020-07-02 | Stop reason: ALTCHOICE

## 2019-12-12 RX ORDER — CELECOXIB 200 MG/1
200 CAPSULE ORAL DAILY
Qty: 30 CAPSULE | Refills: 1 | Status: SHIPPED | OUTPATIENT
Start: 2019-12-12 | End: 2020-07-02 | Stop reason: ALTCHOICE

## 2019-12-12 RX ORDER — KETOROLAC TROMETHAMINE 10 MG/1
10 TABLET, FILM COATED ORAL
COMMUNITY
Start: 2019-09-16

## 2019-12-12 RX ORDER — IBUPROFEN 800 MG/1
TABLET ORAL
Refills: 0 | COMMUNITY
Start: 2019-11-20 | End: 2020-07-02 | Stop reason: ALTCHOICE

## 2020-01-09 ENCOUNTER — OFFICE VISIT (OUTPATIENT)
Dept: ORTHOPEDIC SURGERY | Age: 40
End: 2020-01-09
Payer: COMMERCIAL

## 2020-01-09 VITALS
TEMPERATURE: 97.6 F | BODY MASS INDEX: 38.64 KG/M2 | HEIGHT: 62 IN | HEART RATE: 69 BPM | OXYGEN SATURATION: 99 % | WEIGHT: 210 LBS

## 2020-01-09 PROBLEM — S46.011D: Status: ACTIVE | Noted: 2019-12-12

## 2020-01-09 PROCEDURE — 99213 OFFICE O/P EST LOW 20 MIN: CPT | Performed by: ORTHOPAEDIC SURGERY

## 2020-01-09 RX ORDER — METHYLPREDNISOLONE 4 MG/1
TABLET ORAL
Qty: 42 TABLET | Refills: 0 | Status: SHIPPED | OUTPATIENT
Start: 2020-01-09 | End: 2020-07-02 | Stop reason: ALTCHOICE

## 2020-01-09 NOTE — PROGRESS NOTES
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 dec age 27     No Known Allergies      Review of Systems  No change in her bother musculoskeletal system. Objective:   Pulse 69   Temp 97.6 °F (36.4 °C) (Temporal)   Ht 5' 2\" (1.575 m)   Wt 210 lb (95.3 kg)   LMP  (LMP Unknown) Comment: 2012  SpO2 99%   BMI 38.41 kg/m²     ORTHO EXAM  Tenderness and spasm is noted about the right trapezium. She has limited range of motion his shoulder with aggravation of the pain primarily in the trapezium. She has approximately 80 degrees of abduction, 140 degrees of elevation, and 30 degrees of external rotation. Discomfort is noted through the impingement arc as well as with apprehension testing. Radiographs and Laboratory Studies:     Diagnostic Imaging Studies:    No new studies    Laboratory Studies:   Lab Results   Component Value Date    WBC 7.0 11/18/2019    HGB 12.3 11/18/2019    HCT 37.9 11/18/2019    MCV 92 11/18/2019     11/18/2019     No results found for: SEDRATE  No results found for: CRP    Assessment:       Diagnosis Orders   1. Strain of musc/tend the rotator cuff of right shoulder, subs       Impression continued right shoulder rotator cuff strain with with strain and spasm of the trapezium     Plan:     Plan at this time is have her continue with her therapy. I am going to attempt to minimize her symptoms with it with a prednisone pack. She will stay off work until I see her back in 2 weeks.     No orders of the defined types were placed in this

## 2020-01-23 ENCOUNTER — OFFICE VISIT (OUTPATIENT)
Dept: ORTHOPEDIC SURGERY | Age: 40
End: 2020-01-23
Payer: COMMERCIAL

## 2020-01-23 VITALS
HEIGHT: 62 IN | HEART RATE: 92 BPM | TEMPERATURE: 96.9 F | WEIGHT: 210 LBS | BODY MASS INDEX: 38.64 KG/M2 | OXYGEN SATURATION: 99 %

## 2020-01-23 PROCEDURE — 99213 OFFICE O/P EST LOW 20 MIN: CPT | Performed by: ORTHOPAEDIC SURGERY

## 2020-07-02 ENCOUNTER — OFFICE VISIT (OUTPATIENT)
Dept: FAMILY MEDICINE CLINIC | Age: 40
End: 2020-07-02
Payer: COMMERCIAL

## 2020-07-02 VITALS
OXYGEN SATURATION: 98 % | WEIGHT: 215 LBS | SYSTOLIC BLOOD PRESSURE: 122 MMHG | DIASTOLIC BLOOD PRESSURE: 74 MMHG | HEART RATE: 87 BPM | RESPIRATION RATE: 16 BRPM | HEIGHT: 63 IN | BODY MASS INDEX: 38.09 KG/M2 | TEMPERATURE: 97.3 F

## 2020-07-02 PROBLEM — S46.011D: Status: RESOLVED | Noted: 2019-12-12 | Resolved: 2020-07-02

## 2020-07-02 LAB — HBA1C MFR BLD: 5.6 %

## 2020-07-02 PROCEDURE — 99396 PREV VISIT EST AGE 40-64: CPT | Performed by: FAMILY MEDICINE

## 2020-07-02 PROCEDURE — 83036 HEMOGLOBIN GLYCOSYLATED A1C: CPT | Performed by: FAMILY MEDICINE

## 2020-07-02 RX ORDER — CLOTRIMAZOLE AND BETAMETHASONE DIPROPIONATE 10; .64 MG/G; MG/G
CREAM TOPICAL
Qty: 45 G | Refills: 3 | Status: SHIPPED | OUTPATIENT
Start: 2020-07-02

## 2020-07-02 RX ORDER — DEXAMETHASONE 4 MG/1
TABLET ORAL
COMMUNITY
Start: 2020-07-01

## 2020-07-02 ASSESSMENT — PATIENT HEALTH QUESTIONNAIRE - PHQ9
SUM OF ALL RESPONSES TO PHQ9 QUESTIONS 1 & 2: 0
DEPRESSION UNABLE TO ASSESS: PT REFUSES
SUM OF ALL RESPONSES TO PHQ QUESTIONS 1-9: 0
1. LITTLE INTEREST OR PLEASURE IN DOING THINGS: 0
2. FEELING DOWN, DEPRESSED OR HOPELESS: 0
SUM OF ALL RESPONSES TO PHQ QUESTIONS 1-9: 0

## 2020-07-02 NOTE — PROGRESS NOTES
None     Inability: None    Transportation needs     Medical: None     Non-medical: None   Tobacco Use    Smoking status: Never Smoker    Smokeless tobacco: Never Used   Substance and Sexual Activity    Alcohol use: No    Drug use: No    Sexual activity: None   Lifestyle    Physical activity     Days per week: None     Minutes per session: None    Stress: None   Relationships    Social connections     Talks on phone: None     Gets together: None     Attends Holiness service: None     Active member of club or organization: None     Attends meetings of clubs or organizations: None     Relationship status: None    Intimate partner violence     Fear of current or ex partner: None     Emotionally abused: None     Physically abused: None     Forced sexual activity: None   Other Topics Concern    None   Social History Narrative    Lives with children    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 45 g 3    ketorolac (TORADOL) 10 MG tablet Take 10 mg by mouth      tiZANidine (ZANAFLEX) 4 MG tablet Take 2-4 mg by mouth      rizatriptan (MAXALT-MLT) 10 MG disintegrating tablet   5    DULoxetine (CYMBALTA) 20 MG extended release capsule Take 60 mg by mouth daily       verapamil (CALAN SR) 120 MG extended release tablet Take 1 tablet by mouth daily 30 tablet 3    metFORMIN (GLUCOPHAGE) 500 MG tablet Take 1 tablet by mouth 2 times daily (with meals) 60 tablet 06    glucose blood VI test strips (FREESTYLE LITE) strip 1 each by In Vitro route 2 times daily DX: E11.65, NIDDM 100 each 3    Lancets MISC 1 each by Does not apply route 2 times daily DX: E11.65, NIDDM 100 each 3     No current facility-administered medications for this visit.       No Known Allergies    Do you take any herbs or supplements that were not prescribed by a doctor? no  Are you taking calcium supplements? no      Review of Systems  Do you have pain that bothers you in your daily life? yes  A comprehensive review of systems was negative except for: Neurological: positive for headaches     Objective:      /74 (Site: Right Upper Arm, Position: Sitting, Cuff Size: Large Adult)   Pulse 87   Temp 97.3 °F (36.3 °C) (Tympanic)   Resp 16   Ht 5' 3\" (1.6 m)   Wt 215 lb (97.5 kg)   LMP  (LMP Unknown) Comment: 2012  SpO2 98%   BMI 38.09 kg/m²   General appearance: alert, appears stated age and cooperative  Head: Normocephalic, without obvious abnormality, atraumatic  Eyes: negative findings: lids and lashes normal, conjunctivae and sclerae normal and corneas clear  Ears: normal TM's and external ear canals both ears  Neck: no adenopathy, supple, symmetrical, trachea midline and thyroid not enlarged, symmetric, no tenderness/mass/nodules  Lungs: clear to auscultation bilaterally  Heart: regular rate and rhythm, S1, S2 normal, no murmur, click, rub or gallop  Abdomen: soft, non-tender; bowel sounds normal; no masses,  no organomegaly  Extremities: extremities normal, atraumatic, no cyanosis or edema  Skin: mobility and turgor normal and nails clear without discoloration or sign of infection or eczema - back      Assessment:      Healthy female exam. Eczema of back with possible fungal suprainfection  PCOS and Obesity     Plan:       2. Patient Counseling:  --Nutrition: Stressed importance of moderation in sodium/caffeine intake, saturated fat and cholesterol, caloric balance, sufficient intake of fresh fruits, vegetables, fiber, calcium, iron, and 1 mg of folate supplement per day (for females capable of pregnancy). --Exercise: Stressed the importance of regular exercise.    --Substance Abuse: Discussed cessation/primary prevention of tobacco, alcohol, or other drug use; driving or other dangerous activities under the influence; availability of

## 2020-09-04 NOTE — TELEPHONE ENCOUNTER
The cream that I prescribed was a combination of the antifungal she is requesting as well as a steroid.  Please have her submit an eVisit on [facility] with photos so that I can see what is going on

## 2020-10-19 ENCOUNTER — OFFICE VISIT (OUTPATIENT)
Dept: OBGYN CLINIC | Age: 40
End: 2020-10-19
Payer: COMMERCIAL

## 2020-10-19 VITALS
WEIGHT: 220 LBS | SYSTOLIC BLOOD PRESSURE: 100 MMHG | BODY MASS INDEX: 38.98 KG/M2 | HEIGHT: 63 IN | DIASTOLIC BLOOD PRESSURE: 60 MMHG

## 2020-10-19 PROCEDURE — G8427 DOCREV CUR MEDS BY ELIG CLIN: HCPCS | Performed by: OBSTETRICS & GYNECOLOGY

## 2020-10-19 PROCEDURE — 99213 OFFICE O/P EST LOW 20 MIN: CPT | Performed by: OBSTETRICS & GYNECOLOGY

## 2020-10-19 PROCEDURE — G8417 CALC BMI ABV UP PARAM F/U: HCPCS | Performed by: OBSTETRICS & GYNECOLOGY

## 2020-10-19 PROCEDURE — G8484 FLU IMMUNIZE NO ADMIN: HCPCS | Performed by: OBSTETRICS & GYNECOLOGY

## 2020-10-19 PROCEDURE — 1036F TOBACCO NON-USER: CPT | Performed by: OBSTETRICS & GYNECOLOGY

## 2020-10-19 RX ORDER — VERAPAMIL HYDROCHLORIDE 120 MG/1
120 TABLET, FILM COATED ORAL 3 TIMES DAILY
COMMUNITY
Start: 2020-09-28 | End: 2022-09-09 | Stop reason: SDUPTHER

## 2020-10-19 RX ORDER — IBUPROFEN 800 MG/1
800 TABLET ORAL EVERY 8 HOURS PRN
Qty: 60 TABLET | Refills: 0 | Status: SHIPPED | OUTPATIENT
Start: 2020-10-19

## 2020-11-03 NOTE — PROGRESS NOTES
file   Lifestyle    Physical activity     Days per week: Not on file     Minutes per session: Not on file    Stress: Not on file   Relationships    Social connections     Talks on phone: Not on file     Gets together: Not on file     Attends Mormonism service: Not on file     Active member of club or organization: Not on file     Attends meetings of clubs or organizations: Not on file     Relationship status: Not on file    Intimate partner violence     Fear of current or ex partner: Not on file     Emotionally abused: Not on file     Physically abused: Not on file     Forced sexual activity: Not on file   Other Topics Concern    Not on file   Social History Narrative    Lives with children    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 Review of Systems  As per chief complaint   All other systems reviewed and are negative. Physical Exam:  Vitals:  /60   Ht 5' 3\" (1.6 m)   Wt 220 lb (99.8 kg)   LMP  (LMP Unknown) Comment: 2012  BMI 38.97 kg/m²   Lungs: CTAB   Heart : Regular S1/S2, no M/R/G  Abdomen: Soft , NT, ND , + BS   Pelvic exam : deferred    Assessment:      Diagnosis Orders   1. Pelvic pain in female  US NON OB TRANSVAGINAL    US PELVIS COMPLETE   2. Hx of hysterectomy  US NON OB TRANSVAGINAL    US PELVIS COMPLETE       Plan:     Complete work up and follow   Ibuprofen for pain prescribed.        Orders Placed This Encounter   Procedures    US NON OB TRANSVAGINAL     Standing Status:   Future     Standing Expiration Date:   10/19/2021    US PELVIS COMPLETE     Standing Status:   Future     Standing Expiration Date:   10/19/2021     Order Specific Question:   Reason for exam:     Answer:   AUB     Orders Placed This Encounter   Medications    ibuprofen (ADVIL;MOTRIN) 800 MG tablet     Sig: Take 1 tablet by mouth every 8

## 2020-12-01 ENCOUNTER — HOSPITAL ENCOUNTER (OUTPATIENT)
Dept: ULTRASOUND IMAGING | Age: 40
Discharge: HOME | End: 2020-12-03
Payer: COMMERCIAL

## 2020-12-01 PROCEDURE — 76830 TRANSVAGINAL US NON-OB: CPT

## 2020-12-01 PROCEDURE — 76856 US EXAM PELVIC COMPLETE: CPT

## 2020-12-07 ENCOUNTER — OFFICE VISIT (OUTPATIENT)
Dept: OBGYN CLINIC | Age: 40
End: 2020-12-07
Payer: COMMERCIAL

## 2020-12-07 VITALS
SYSTOLIC BLOOD PRESSURE: 100 MMHG | WEIGHT: 213 LBS | HEIGHT: 63 IN | HEART RATE: 64 BPM | DIASTOLIC BLOOD PRESSURE: 64 MMHG | BODY MASS INDEX: 37.74 KG/M2

## 2020-12-07 PROCEDURE — 99213 OFFICE O/P EST LOW 20 MIN: CPT | Performed by: OBSTETRICS & GYNECOLOGY

## 2020-12-07 PROCEDURE — G8484 FLU IMMUNIZE NO ADMIN: HCPCS | Performed by: OBSTETRICS & GYNECOLOGY

## 2020-12-07 PROCEDURE — G8417 CALC BMI ABV UP PARAM F/U: HCPCS | Performed by: OBSTETRICS & GYNECOLOGY

## 2020-12-07 PROCEDURE — 1036F TOBACCO NON-USER: CPT | Performed by: OBSTETRICS & GYNECOLOGY

## 2020-12-07 PROCEDURE — G8427 DOCREV CUR MEDS BY ELIG CLIN: HCPCS | Performed by: OBSTETRICS & GYNECOLOGY

## 2020-12-07 NOTE — PROGRESS NOTES
Results Review      Huber Moreno is a 41y.o. year old female here to discuss US results. PREVIOUS VISIT: pelvic pain    Vitals:  /64 (Site: Left Upper Arm, Position: Sitting, Cuff Size: Large Adult)   Pulse 64   Ht 5' 3\" (1.6 m)   Wt 213 lb (96.6 kg)   LMP  (LMP Unknown) Comment:   BMI 37.73 kg/m²   Allergies:  Patient has no known allergies.   Past Medical History:   Diagnosis Date    Chiari I malformation ([facility] [address]     Eczema     Migraines     PCO (polycystic ovaries)     S/P complete hysterectomy     Dr Shen Ng, atypical endocervical cells    Strain of musc/tend the rotator cuff of right shoulder, subs 2019    Type II diabetes mellitus, uncontrolled ([facility] [address]     Dr Martínez Given     Past Surgical History:   Procedure Laterality Date    HYSTERECTOMY, TOTAL ABDOMINAL      Dr Shen Ng, cervical atypia     OB History        4    Para   4    Term   4            AB        Living   4       SAB        TAB        Ectopic        Molar        Multiple        Live Births                     Family History   Problem Relation Age of Onset   Kiersten Guzman Cancer Father         dec age 59    Cancer Mother         ovarian? dec age 27     Social History     Socioeconomic History    Marital status: Single     Spouse name: Not on file    Number of children: 3    Years of education: Not on file    Highest education level: Not on file   Occupational History    Occupation: home health aide   Social Needs    Financial resource strain: Not on file    Food insecurity     Worry: Not on file     Inability: Not on file   [employer] needs     Medical: Not on file     Non-medical: Not on file   Tobacco Use    Smoking status: Never Smoker    Smokeless tobacco: Never Used   Substance and Sexual Activity    Alcohol use: No    Drug use: No    Sexual activity: Not on file   Lifestyle    Physical activity     Days per week: Not on file     Minutes per session: Not on file    Stress: Not on file cystic adnexal masses or significant free fluid in the pelvis. On transvaginal views peristalsing bowel loops partially obscures the adnexa. Ovaries are not visualized. No definite large cystic adnexal masses or significant free fluid in the pelvis. NO PELVIC ULTRASOUND ABNORMALITY IN THIS PATIENT WITH A HISTORY OF HYSTERECTOMY. HOWEVER BOWEL LOOPS IN THE PELVIS LIMITED ADNEXAL ASSESSMENT AND THE OVARIES ARE NOT VISUALIZED    Us Pelvis Complete    Result Date: 12/1/2020  EXAMINATION: US NON OB TRANSVAGINAL, US PELVIS COMPLETE CLINICAL HISTORY: 41year-old with left pelvic pain x1 month. She has a history of surgical resection of the uterus. COMPARISONS: Pelvic ultrasound 3/18/2014 FINDINGS: Transabdominal and transvaginal ultrasounds of the a pelvis were performed. On the transabdominal views limited images the urinary bladder are unremarkable. Uterus is surgically absent allowing bowel loops to fall on the pelvis compromising the  sonographic window. Ovaries are not imaged. No large cystic adnexal masses or significant free fluid in the pelvis. On transvaginal views peristalsing bowel loops partially obscures the adnexa. Ovaries are not visualized. No definite large cystic adnexal masses or significant free fluid in the pelvis. NO PELVIC ULTRASOUND ABNORMALITY IN THIS PATIENT WITH A HISTORY OF HYSTERECTOMY. HOWEVER BOWEL LOOPS IN THE PELVIS LIMITED ADNEXAL ASSESSMENT AND THE OVARIES ARE NOT VISUALIZED    Assessment:      Diagnosis Orders   1. Pelvic pain in female            Plan:   Pain minimal   Results discussed  patient re-assured    No orders of the defined types were placed in this encounter. No orders of the defined types were placed in this encounter. Follow up:  Return if symptoms worsen or fail to improve, for next annual exam visit. Risks, benefits and alternative therapies fortreatment discussed. Pt elects expectant management for therapy.          Ricardo Blair MD

## 2021-03-05 LAB
AVERAGE GLUCOSE: NORMAL
HBA1C MFR BLD: 5.4 %

## 2022-07-26 ENCOUNTER — HOSPITAL ENCOUNTER (EMERGENCY)
Age: 42
Discharge: HOME | End: 2022-07-26
Payer: COMMERCIAL

## 2022-07-26 VITALS
WEIGHT: 210 LBS | HEIGHT: 62 IN | TEMPERATURE: 97.1 F | DIASTOLIC BLOOD PRESSURE: 89 MMHG | HEART RATE: 72 BPM | RESPIRATION RATE: 20 BRPM | BODY MASS INDEX: 38.64 KG/M2 | OXYGEN SATURATION: 98 % | SYSTOLIC BLOOD PRESSURE: 118 MMHG

## 2022-07-26 DIAGNOSIS — M79.10 MUSCLE PAIN: Primary | ICD-10-CM

## 2022-07-26 LAB
ALBUMIN SERPL-MCNC: 3.9 G/DL (ref 3.5–4.6)
ALP BLD-CCNC: 83 U/L (ref 40–130)
ALT SERPL-CCNC: 16 U/L (ref 0–33)
ANION GAP SERPL CALCULATED.3IONS-SCNC: 8 MEQ/L (ref 9–15)
AST SERPL-CCNC: 12 U/L (ref 0–35)
BASOPHILS ABSOLUTE: 0 K/UL (ref 0–0.2)
BASOPHILS RELATIVE PERCENT: 0.7 %
BILIRUB SERPL-MCNC: 0.3 MG/DL (ref 0.2–0.7)
BUN BLDV-MCNC: 11 MG/DL (ref 6–20)
CALCIUM SERPL-MCNC: 9 MG/DL (ref 8.5–9.9)
CHLORIDE BLD-SCNC: 108 MEQ/L (ref 95–107)
CO2: 25 MEQ/L (ref 20–31)
CREAT SERPL-MCNC: 0.65 MG/DL (ref 0.5–0.9)
EOSINOPHILS ABSOLUTE: 0.1 K/UL (ref 0–0.7)
EOSINOPHILS RELATIVE PERCENT: 1 %
GFR AFRICAN AMERICAN: >60
GFR NON-AFRICAN AMERICAN: >60
GLOBULIN: 3.2 G/DL (ref 2.3–3.5)
GLUCOSE BLD-MCNC: 93 MG/DL (ref 70–99)
HCT VFR BLD CALC: 39.7 % (ref 37–47)
HEMOGLOBIN: 12.9 G/DL (ref 12–16)
INFLUENZA A BY PCR: NEGATIVE
INFLUENZA B BY PCR: NEGATIVE
LYMPHOCYTES ABSOLUTE: 2.4 K/UL (ref 1–4.8)
LYMPHOCYTES RELATIVE PERCENT: 41 %
MAGNESIUM: 2 MG/DL (ref 1.7–2.4)
MCH RBC QN AUTO: 29.5 PG (ref 27–31.3)
MCHC RBC AUTO-ENTMCNC: 32.5 % (ref 33–37)
MCV RBC AUTO: 91 FL (ref 82–100)
MONOCYTES ABSOLUTE: 0.5 K/UL (ref 0.2–0.8)
MONOCYTES RELATIVE PERCENT: 8.5 %
NEUTROPHILS ABSOLUTE: 2.8 K/UL (ref 1.4–6.5)
NEUTROPHILS RELATIVE PERCENT: 48.8 %
PDW BLD-RTO: 13.8 % (ref 11.5–14.5)
PLATELET # BLD: 303 K/UL (ref 130–400)
POTASSIUM SERPL-SCNC: 4.3 MEQ/L (ref 3.4–4.9)
RBC # BLD: 4.37 M/UL (ref 4.2–5.4)
SARS-COV-2, NAAT: NOT DETECTED
SODIUM BLD-SCNC: 141 MEQ/L (ref 135–144)
TOTAL CK: 100 U/L (ref 0–170)
TOTAL PROTEIN: 7.1 G/DL (ref 6.3–8)
WBC # BLD: 5.8 K/UL (ref 4.8–10.8)

## 2022-07-26 PROCEDURE — 87502 INFLUENZA DNA AMP PROBE: CPT

## 2022-07-26 PROCEDURE — 87635 SARS-COV-2 COVID-19 AMP PRB: CPT

## 2022-07-26 PROCEDURE — 80053 COMPREHEN METABOLIC PANEL: CPT

## 2022-07-26 PROCEDURE — 85025 COMPLETE CBC W/AUTO DIFF WBC: CPT

## 2022-07-26 PROCEDURE — 83735 ASSAY OF MAGNESIUM: CPT

## 2022-07-26 PROCEDURE — 36415 COLL VENOUS BLD VENIPUNCTURE: CPT

## 2022-07-26 PROCEDURE — 82550 ASSAY OF CK (CPK): CPT

## 2022-07-26 PROCEDURE — 96372 THER/PROPH/DIAG INJ SC/IM: CPT

## 2022-07-26 PROCEDURE — 99284 EMERGENCY DEPT VISIT MOD MDM: CPT

## 2022-07-26 PROCEDURE — 6360000002 HC RX W HCPCS: Performed by: PHYSICIAN ASSISTANT

## 2022-07-26 RX ORDER — KETOROLAC TROMETHAMINE 30 MG/ML
60 INJECTION, SOLUTION INTRAMUSCULAR; INTRAVENOUS ONCE
Status: COMPLETED | OUTPATIENT
Start: 2022-07-26 | End: 2022-07-26

## 2022-07-26 RX ADMIN — KETOROLAC TROMETHAMINE 60 MG: 30 INJECTION, SOLUTION INTRAMUSCULAR at 08:54

## 2022-07-26 ASSESSMENT — PAIN DESCRIPTION - FREQUENCY: FREQUENCY: CONTINUOUS

## 2022-07-26 ASSESSMENT — PAIN DESCRIPTION - DESCRIPTORS
DESCRIPTORS: ACHING
DESCRIPTORS: ACHING

## 2022-07-26 ASSESSMENT — ENCOUNTER SYMPTOMS
DIARRHEA: 0
BACK PAIN: 0
PHOTOPHOBIA: 0
RHINORRHEA: 0
COUGH: 0
SORE THROAT: 0
SHORTNESS OF BREATH: 0
ABDOMINAL PAIN: 0
EYE PAIN: 0
NAUSEA: 0
VOMITING: 0

## 2022-07-26 ASSESSMENT — PAIN DESCRIPTION - LOCATION
LOCATION: GENERALIZED
LOCATION: GENERALIZED

## 2022-07-26 ASSESSMENT — PAIN SCALES - GENERAL
PAINLEVEL_OUTOF10: 6
PAINLEVEL_OUTOF10: 6

## 2022-07-26 ASSESSMENT — PAIN - FUNCTIONAL ASSESSMENT: PAIN_FUNCTIONAL_ASSESSMENT: 0-10

## 2022-07-26 ASSESSMENT — PAIN DESCRIPTION - PAIN TYPE: TYPE: ACUTE PAIN

## 2022-07-26 NOTE — ED PROVIDER NOTES
[address] [facility] ED  EMERGENCY DEPARTMENT ENCOUNTER      Pt Name: Karlene Awad  MRN: [id number]  [place] 1980  Date of evaluation: 7/26/2022  Provider: Solomon Pathak PA-C      HISTORY OF PRESENT ILLNESS    Karlene Awad is a 35 y.o. female who presents to the Emergency Department with body aches worse in her legs for the last 2 days. She has no fever, headache, cough, cp, sob, abdominal pain, n/v/d. She tried tylenol. She felt similar when she had covid last year. Denies known exposure. Reports to working long hours 12hr shifts standing all day. REVIEW OF SYSTEMS       Review of Systems   Constitutional:  Negative for chills, diaphoresis, fatigue and fever. HENT:  Negative for congestion, rhinorrhea and sore throat. Eyes:  Negative for photophobia and pain. Respiratory:  Negative for cough and shortness of breath. Cardiovascular:  Negative for chest pain and palpitations. Gastrointestinal:  Negative for abdominal pain, diarrhea, nausea and vomiting. Genitourinary:  Negative for dysuria and flank pain. Musculoskeletal:  Positive for myalgias. Negative for back pain. Skin:  Negative for rash. Neurological:  Negative for dizziness, light-headedness and headaches. Psychiatric/Behavioral: Negative. All other systems reviewed and are negative.       PAST MEDICAL HISTORY     Past Medical History:   Diagnosis Date    Chiari I malformation ([facility] [address]     Eczema     Migraines 2010    PCO (polycystic ovaries)     S/P complete hysterectomy 2012    Dr Shirlene Silva, atypical endocervical cells    Strain of musc/tend the rotator cuff of right shoulder, subs 12/12/2019    Type II diabetes mellitus, uncontrolled ([facility] [address]     Dr Alba Chavarria       Past Surgical History:   Procedure Laterality Date    HYSTERECTOMY, TOTAL ABDOMINAL (CERVIX REMOVED)  2012    Dr Shirlene Silva, cervical atypia         CURRENT MEDICATIONS       Discharge Medication List as of 7/26/2022 10:35 AM CONTINUE these medications which have NOT CHANGED    Details   verapamil (CALAN) 120 MG tablet Take 120 mg by mouth 3 times dailyHistorical Med      ibuprofen (ADVIL;MOTRIN) 800 MG tablet Take 1 tablet by mouth every 8 hours as needed for Pain, Disp-60 tablet,R-0Normal      dexamethasone (DECADRON) 4 MG tablet Historical Med      clotrimazole-betamethasone (LOTRISONE) 1-0.05 % cream Apply topically 2 times daily. , Disp-45 g, R-3, Normal      ketorolac (TORADOL) 10 MG tablet Take 10 mg by mouthHistorical Med      tiZANidine (ZANAFLEX) 4 MG tablet Take 2-4 mg by mouthHistorical Med      rizatriptan (MAXALT-MLT) 10 MG disintegrating tablet R-5Historical Med      DULoxetine (CYMBALTA) 20 MG extended release capsule Take 60 mg by mouth daily Historical Med      verapamil (CALAN SR) 120 MG extended release tablet Take 1 tablet by mouth daily, Disp-30 tablet, R-3Normal      metFORMIN (GLUCOPHAGE) 500 MG tablet Take 1 tablet by mouth 2 times daily (with meals), Disp-60 tablet, R-06Normal      glucose blood VI test strips (FREESTYLE LITE) strip 2 TIMES DAILY Starting 6/15/2016, Until Discontinued, Disp-100 each, R-3, NormalDX: E11.65, NIDDM      Lancets MISC 2 TIMES DAILY Starting 6/15/2016, Until Discontinued, Disp-100 each, R-3, NormalDX: E11.65, NIDDM             ALLERGIES     Patient has no known allergies.     FAMILY HISTORY       Family History   Problem Relation Age of Onset    Cancer Father         dec age 61    Cancer Mother         ovarian? 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 Constitutional:       General: She is not in acute distress. Appearance: Normal appearance. She is well-developed. She is not diaphoretic. HENT:      Head: Normocephalic and atraumatic. Nose: Nose normal.      Mouth/Throat:      Mouth: Mucous membranes are moist.   Eyes:      General: Lids are normal.      Conjunctiva/sclera: Conjunctivae normal.   Cardiovascular:      Rate and Rhythm: Normal rate and regular rhythm. Pulses: Normal pulses. Heart sounds: Normal heart sounds. Pulmonary:      Effort: Pulmonary effort is normal.      Breath sounds: Normal breath sounds. Abdominal:      General: Bowel sounds are normal.      Palpations: Abdomen is soft. Tenderness: There is no abdominal tenderness. Musculoskeletal:         General: Normal range of motion. Cervical back: Normal range of motion and neck supple. Comments: From of uppr and lower extremities. [place]. 2+distal pulses. Lower legs have no calf swelling redness or warmth. Muscle compartments are soft and skin in appropriate for ethnicity   Lymphadenopathy:      Cervical: No cervical adenopathy. Skin:     General: Skin is warm and dry. Capillary Refill: Capillary refill takes less than 2 seconds. Findings: No rash. Neurological:      Mental Status: She is alert and oriented to person, place, and time. Psychiatric:         Thought Content:  Thought content normal.         Judgment: Judgment normal.         All other labs were within normal range or not returned as of this dictation. EMERGENCY DEPARTMENT COURSE and DIFFERENTIALDIAGNOSIS/MDM:   Vitals:    Vitals:    07/26/22 0801   BP: 118/89   Pulse: 72   Resp: 20   Temp: 97.1 °F (36.2 °C)   TempSrc: Temporal   SpO2: 98%   Weight: 210 lb (95.3 kg)   Height: 5' 2\" (1.575 m)            Jenaro Share to the emergency department with body aches. She is concerned she could have COVID. Her her aches are worse in her legs. Denies any recent travel history of DVT or PE. I have low suspicion that this as an embolus at this time. Patient was provided with Toradol for the pain. Labs are grossly unremarkable COVID is negative. She does work long hours standing for 12 hours this could be muscle fatigue recommending hydration as well as some basic compression stockings. Patient was provided with a work note. Vies to return to the ED for any new worsening concerning symptoms. Patient verbalized understanding patient stable for discharge. PROCEDURES:  Unless otherwise noted below, none     Procedures      FINAL IMPRESSION      1.  Muscle pain          DISPOSITION/PLAN   DISPOSITION Decision To Discharge 07/26/2022 10:34:49 AM          Pratmia Stevenson (electronically signed)  Attending Emergency Physician       Carlos A Elam PA-C  07/26/22 0787

## 2022-07-26 NOTE — ED TRIAGE NOTES
Pt presents to ED from home with c/o generalized body aches and weakness. Pt reports that symptoms have been present since Saturday. Denies known flu A/B, or COVID-19 exposures. Reports recent travel to [facility]. Upon assessment, pt is A/Ox4, skin appropriate for ethnicity/w/d, respirations even and unlabored, msp's intact. Pt denies chest pain, ,SOB, n/v/d, fever, and chills. Pt reports taking OTC Tylenol w/o relief.

## 2022-07-26 NOTE — Clinical Note
Beti Velasco was seen and treated in our emergency department on 7/26/2022. She may return to work on 07/28/2022. If you have any questions or concerns, please don't hesitate to call.       Sandra Owens

## 2022-09-09 ENCOUNTER — OFFICE VISIT (OUTPATIENT)
Dept: FAMILY MEDICINE CLINIC | Age: 42
End: 2022-09-09
Payer: COMMERCIAL

## 2022-09-09 VITALS
HEART RATE: 83 BPM | OXYGEN SATURATION: 98 % | BODY MASS INDEX: 39.01 KG/M2 | HEIGHT: 62 IN | WEIGHT: 212 LBS | SYSTOLIC BLOOD PRESSURE: 128 MMHG | TEMPERATURE: 97.1 F | DIASTOLIC BLOOD PRESSURE: 74 MMHG

## 2022-09-09 DIAGNOSIS — Z00.00 ENCOUNTER FOR WELL ADULT EXAM WITHOUT ABNORMAL FINDINGS: Primary | ICD-10-CM

## 2022-09-09 DIAGNOSIS — Z11.59 NEED FOR HEPATITIS C SCREENING TEST: ICD-10-CM

## 2022-09-09 DIAGNOSIS — Z13.1 SCREENING FOR DIABETES MELLITUS: ICD-10-CM

## 2022-09-09 DIAGNOSIS — R25.2 CRAMPING OF HANDS: ICD-10-CM

## 2022-09-09 DIAGNOSIS — Z23 NEED FOR PROPHYLACTIC VACCINATION WITH TETANUS-DIPHTHERIA (TD): ICD-10-CM

## 2022-09-09 DIAGNOSIS — E66.01 CLASS 2 SEVERE OBESITY DUE TO EXCESS CALORIES WITH SERIOUS COMORBIDITY AND BODY MASS INDEX (BMI) OF 39.0 TO 39.9 IN ADULT (HCC): Chronic | ICD-10-CM

## 2022-09-09 LAB — HBA1C MFR BLD: 5.3 %

## 2022-09-09 PROCEDURE — 83036 HEMOGLOBIN GLYCOSYLATED A1C: CPT | Performed by: FAMILY MEDICINE

## 2022-09-09 PROCEDURE — 99214 OFFICE O/P EST MOD 30 MIN: CPT | Performed by: FAMILY MEDICINE

## 2022-09-09 PROCEDURE — G8417 CALC BMI ABV UP PARAM F/U: HCPCS | Performed by: FAMILY MEDICINE

## 2022-09-09 PROCEDURE — G8427 DOCREV CUR MEDS BY ELIG CLIN: HCPCS | Performed by: FAMILY MEDICINE

## 2022-09-09 PROCEDURE — 99396 PREV VISIT EST AGE 40-64: CPT | Performed by: FAMILY MEDICINE

## 2022-09-09 PROCEDURE — 1036F TOBACCO NON-USER: CPT | Performed by: FAMILY MEDICINE

## 2022-09-09 RX ORDER — ONDANSETRON 4 MG/1
4 TABLET, ORALLY DISINTEGRATING ORAL EVERY 6 HOURS PRN
COMMUNITY
Start: 2021-12-28

## 2022-09-09 RX ORDER — ZONISAMIDE 100 MG/1
100 CAPSULE ORAL 2 TIMES DAILY
COMMUNITY

## 2022-09-09 RX ORDER — MAGNESIUM OXIDE 400 MG/1
400 TABLET ORAL DAILY
Qty: 30 TABLET | Refills: 5 | Status: SHIPPED | OUTPATIENT
Start: 2022-09-09

## 2022-09-09 RX ORDER — FREMANEZUMAB-VFRM 225 MG/1.5ML
INJECTION SUBCUTANEOUS
COMMUNITY
Start: 2022-08-31

## 2022-09-09 SDOH — ECONOMIC STABILITY: FOOD INSECURITY: WITHIN THE PAST 12 MONTHS, YOU WORRIED THAT YOUR FOOD WOULD RUN OUT BEFORE YOU GOT MONEY TO BUY MORE.: NEVER TRUE

## 2022-09-09 SDOH — ECONOMIC STABILITY: TRANSPORTATION INSECURITY
IN THE PAST 12 MONTHS, HAS LACK OF TRANSPORTATION KEPT YOU FROM MEETINGS, WORK, OR FROM GETTING THINGS NEEDED FOR DAILY LIVING?: NO

## 2022-09-09 SDOH — ECONOMIC STABILITY: TRANSPORTATION INSECURITY
IN THE PAST 12 MONTHS, HAS THE LACK OF TRANSPORTATION KEPT YOU FROM MEDICAL APPOINTMENTS OR FROM GETTING MEDICATIONS?: NO

## 2022-09-09 SDOH — ECONOMIC STABILITY: FOOD INSECURITY: WITHIN THE PAST 12 MONTHS, THE FOOD YOU BOUGHT JUST DIDN'T LAST AND YOU DIDN'T HAVE MONEY TO GET MORE.: NEVER TRUE

## 2022-09-09 ASSESSMENT — PATIENT HEALTH QUESTIONNAIRE - PHQ9
2. FEELING DOWN, DEPRESSED OR HOPELESS: 0
SUM OF ALL RESPONSES TO PHQ QUESTIONS 1-9: 0
1. LITTLE INTEREST OR PLEASURE IN DOING THINGS: 0
SUM OF ALL RESPONSES TO PHQ9 QUESTIONS 1 & 2: 0
SUM OF ALL RESPONSES TO PHQ QUESTIONS 1-9: 0

## 2022-09-09 ASSESSMENT — SOCIAL DETERMINANTS OF HEALTH (SDOH): HOW HARD IS IT FOR YOU TO PAY FOR THE VERY BASICS LIKE FOOD, HOUSING, MEDICAL CARE, AND HEATING?: NOT HARD AT ALL

## 2022-09-09 NOTE — PROGRESS NOTES
Well Adult Note  Name: Nimisha Veronica Date: 2022   MRN: [id number] Sex: Female   Age: 35 y.o. Ethnicity: Non- / Non    : 1980 Race: Alejandro Augustin / African American      Karlene Awad is here for well adult exam    History:  No chest pain, pressure or tightness, dizziness, headache, vision changes, palpitations, swelling in feet/legs. No fevers, chills, sweats. No unintended weight loss. No abdominal pain, nausea, vomiting, diarrhea, constipation, bloody stools, black tarry stools. No rashes. No swollen glands. Obesity: is exercising,eating fruit and vegetable, chicken, fish. No sweetened drinks. Detox tea every morning. Eats one time a day. Hands are cramping up especially when she is working with her hands or holding something in her hands. No Known Allergies      Prior to Visit Medications    Medication Sig Taking?  Authorizing Provider   AJOVY 225 MG/1.5ML SOAJ  Yes Historical Provider, MD   ondansetron (ZOFRAN-ODT) 4 MG disintegrating tablet Take 4 mg by mouth every 6 hours as needed Yes Historical Provider, MD   zonisamide (ZONEGRAN) 100 MG capsule Take 100 mg by mouth in the morning and at bedtime Yes Historical Provider, MD   magnesium oxide (MAG-OX) 400 MG tablet Take 1 tablet by mouth daily Yes Jeannine Carter MD   ibuprofen (ADVIL;MOTRIN) 800 MG tablet Take 1 tablet by mouth every 8 hours as needed for Pain Yes Deondre Jimenez MD   ketorolac (TORADOL) 10 MG tablet Take 10 mg by mouth Yes Historical Provider, MD   tiZANidine (ZANAFLEX) 4 MG tablet Take 2-4 mg by mouth Yes Historical Provider, MD   rizatriptan (MAXALT-MLT) 10 MG disintegrating tablet  Yes Historical Provider, MD   DULoxetine (CYMBALTA) 20 MG extended release capsule Take 60 mg by mouth daily  Yes Historical Provider, MD   verapamil (CALAN SR) 120 MG extended release tablet Take 1 tablet by mouth daily Yes Jeannine Carter MD   glucose blood VI test strips (FREESTYLE LITE) strip Cardiovascular:      Rate and Rhythm: Normal rate and regular rhythm. Heart sounds: Normal heart sounds. Pulmonary:      Effort: Pulmonary effort is normal.      Breath sounds: Normal breath sounds. Abdominal:      General: Bowel sounds are normal. There is no distension. Palpations: Abdomen is soft. There is no mass. Tenderness: There is no abdominal tenderness. Musculoskeletal:      Right lower leg: No edema. Left lower leg: No edema. Skin:     General: Skin is warm and dry. Neurological:      Mental Status: She is alert and oriented to person, place, and time. Psychiatric:         Mood and Affect: Mood normal.         Behavior: Behavior normal.         Thought Content: Thought content normal.         Judgment: Judgment normal.         Assessment   Plan   1. Encounter for well adult exam without abnormal findings  2. Class 2 severe obesity due to excess calories with serious comorbidity and body mass index (BMI) of 39.0 to 39.9 in adult [facility])  Comments:  stable, fair control; reveiwed diet and activity   3. Cramping of hands  Comments:  recommended magnesium  Orders:  -     magnesium oxide (MAG-OX) 400 MG tablet; Take 1 tablet by mouth daily, Disp-30 tablet, R-5Normal  4. Screening for diabetes mellitus  -     POCT glycosylated hemoglobin (Hb A1C)  5. Need for hepatitis C screening test  -     Hepatitis C Antibody; Future  6. Need for prophylactic vaccination with tetanus-diphtheria (Td)  -     Tdap (ADACEL) 5-2-15.5 LF-MCG/0.5 injection;  Inject 0.5 mLs into the muscle once for 1 dose, Disp-0.5 mL, R-0Print       Personalized Preventive Plan   Current Health Maintenance Status  Immunization History   Administered Date(s) Administered    Pneumococcal Conjugate 13-valent [employer]) 05/24/2016        Health Maintenance   Topic Date Due    DTaP/Tdap/Td vaccine (1 - Tdap) Never done    COVID-19 Vaccine (2 - Pfizer series) 11/18/2021    A1C test (Diabetic or Prediabetic)  03/05/2022 Flu vaccine (1) Never done    Depression Screen  09/09/2023    Lipids  03/16/2024    Hepatitis C screen  Completed    HIV screen  Addressed    Hepatitis A vaccine  Aged Out    Hepatitis B vaccine  Aged Out    Hib vaccine  Aged Out    Meningococcal (ACWY) vaccine  Aged Out    Pneumococcal 0-64 years Vaccine  Aged Out     Recommendations for [facility] Due: see orders and patient instructions/AVS.    Return in about 1 year (around 9/9/2023) for Wellness Visit - OV. Obesity Counseling: Assessed behavioral health risks and factors affecting choice of behavior. Suggested weight control approaches, including dietary changes behavioral modification and follow up plan. Provided educational and support documentation. Time spent (minutes): 5  Cardiovascular Disease Risk Counseling: Assessed the patient's risk to develop cardiovascular disease and reviewed main risk factors. Reviewed steps to reduce disease risk including:   Quitting tobacco use, reducing amount smoked, or not starting the habit  Making healthy food choices  Being physically active and gradualy increasing activity levels   Reduce weight and determine a healthy BMI goal  Monitor blood pressure and treat if higher than 140/90 mmHg    Provided a follow up plan.   Time spent (minutes): 3

## 2022-09-09 NOTE — PATIENT INSTRUCTIONS
Jaqueline for Phone: My Fitness Pal.     Book: [place] Women Don't Get Fat. In [employer]     DASH Diet: Care Instructions  Your Care Instructions     The DASH diet is an eating plan that can help lower your blood pressure. DASH stands for Dietary Approaches to Stop Hypertension. Hypertension is high bloodpressure. The DASH diet focuses on eating foods that are high in calcium, potassium, and magnesium. These nutrients can lower blood pressure. The foods that are highest in these nutrients are fruits, vegetables, low-fat dairy products, nuts, seeds, and legumes. But taking calcium, potassium, and magnesium supplements instead of eating foods that are high in those nutrients does not have the same effect. The DASH diet also includes whole grains, fish, and poultry. The DASH diet is one of several lifestyle changes your doctor may recommend to lower your high blood pressure. Your doctor may also want you to decrease the amount of sodium in your diet. Lowering sodium while following the DASH dietcan lower blood pressure even further than just the DASH diet alone. Follow-up care is a key part of your treatment and safety. Be sure to make and go to all appointments, and call your doctor if you are having problems. It's also a good idea to know your test results and keep alist of the medicines you take. How can you care for yourself at home? Following the DASH diet  Eat 4 to 5 servings of fruit each day. A serving is 1 medium-sized piece of fruit, ½ cup chopped or canned fruit, 1/4 cup dried fruit, or 4 ounces (½ cup) of fruit juice. Choose fruit more often than fruit juice. Eat 4 to 5 servings of vegetables each day. A serving is 1 cup of lettuce or raw leafy vegetables, ½ cup of chopped or cooked vegetables, or 4 ounces (½ cup) of vegetable juice. Choose vegetables more often than vegetable juice. Get 2 to 3 servings of low-fat and fat-free dairy each day.  A serving is 8 ounces of milk, 1 cup of yogurt, or 1 ½ ounces of cheese. Eat 6 to 8 servings of grains each day. A serving is 1 slice of bread, 1 ounce of dry cereal, or ½ cup of cooked rice, pasta, or cooked cereal. Try to choose whole-grain products as much as possible. Limit lean meat, poultry, and fish to 2 servings each day. A serving is 3 ounces, about the size of a deck of cards. Eat 4 to 5 servings of nuts, seeds, and legumes (cooked dried beans, lentils, and split peas) each week. A serving is 1/3 cup of nuts, 2 tablespoons of seeds, or ½ cup of cooked beans or peas. Limit fats and oils to 2 to 3 servings each day. A serving is 1 teaspoon of vegetable oil or 2 tablespoons of salad dressing. Limit sweets and added sugars to 5 servings or less a week. A serving is 1 tablespoon jelly or jam, ½ cup sorbet, or 1 cup of lemonade. Eat less than 2,300 milligrams (mg) of sodium a day. If you limit your sodium to 1,500 mg a day, you can lower your blood pressure even more. Be aware that all of these are the suggested number of servings for people who eat 1,800 to 2,000 calories a day. Your recommended number of servings may be different if you need more or fewer calories. Tips for success  Start small. Do not try to make dramatic changes to your diet all at once. You might feel that you are missing out on your favorite foods and then be more likely to not follow the plan. Make small changes, and stick with them. Once those changes become habit, add a few more changes. Try some of the following:  Make it a goal to eat a fruit or vegetable at every meal and at snacks. This will make it easy to get the recommended amount of fruits and vegetables each day. Try yogurt topped with fruit and nuts for a snack or healthy dessert. Add lettuce, tomato, cucumber, and onion to sandwiches. Combine a ready-made pizza crust with low-fat mozzarella cheese and lots of vegetable toppings. Try using tomatoes, squash, spinach, broccoli, carrots, cauliflower, and onions.   Have a variety of cut-up vegetables with a low-fat dip as an appetizer instead of chips and dip. Sprinkle sunflower seeds or chopped almonds over salads. Or try adding chopped walnuts or almonds to cooked vegetables. Try some vegetarian meals using beans and peas. Add garbanzo or kidney beans to salads. Make burritos and tacos with mashed laws beans or black beans. Where can you learn more? Go to https://Precursor EnergeticspeBorqs.newMentor. org and sign in to your Gaming Live TV account. Enter D927 in the [facility] box to learn more about \"DASH Diet: Care Instructions. \"     If you do not have an account, please click on the \"Sign Up Now\" link. Current as of: January 10, 2022               Content Version: 13.3  © 6037-3995 WALTOP. Care instructions adapted under license by [facility] ([facility]). If you have questions about a medical condition or this instruction, always ask your healthcare professional. [address] any warranty or liability for your use of this information. Learning About Healthy Weight  What is a healthy weight? A healthy weight is the weight at which you feel good about yourself and have energy for work and play. It's also one that lowers your risk for healthproblems. What can you do to stay at a healthy weight? It can be hard to stay at a healthy weight, especially when fast food, vending-machine snacks, and processed foods are so easy to find. And with your busy lifestyle, activity may be low on your list of things to do. But stayingat a healthy weight may be easier than you think. Here are some dos and don'ts for staying at a healthy weight. Do eat healthy foods  The kinds of foods you eat have a big impact on both your weight and your health. Reaching and staying at a healthy weight is not about going on a diet. It's about making healthier food choices every day and changing your diet forgood.   Healthy eating means eating a variety of foods so that you get all the nutrients you need. Your body needs protein, carbohydrate, and fats for energy. They keep your heart beating, your brain active, and your muscles working. On most days, try to eat from each food group. This means eating a variety of: Whole grains, such as whole wheat breads and pastas. Fruits and vegetables. Dairy products, such as low-fat milk, yogurt, and cheese. Lean proteins, such as all types of fish, chicken without the skin, and beans. Don't have too much or too little of one thing. All foods, if eaten inmoderation, can be part of healthy eating. Even sweets can be okay. If your favorite foods are high in fat, salt, sugar, or calories, limit howoften you eat them. Eat smaller servings, or look for healthy substitutes. Do watch what you eat  Many people eat more than their bodies need. Part of staying at a healthy weight means learning how much food you really need from day to day and not eating more than that. Even with healthy foods, eating too much can make yougain weight. Having a well-balanced diet means that you eat enough, but not too much, and that your food gives you the nutrients you need to stay healthy. So listen toyour body. Eat when you're hungry. Stop when you feel satisfied. It's a good idea to have healthy snacks ready for when you get hungry. Keep healthy snacks with you at work, in your car, and at home. If you have a healthy snack easily available, you'll be less likely to pick a candy bar orbag of chips from a vending machine instead. Some healthy snacks you might want to keep on hand are fruit, low-fat yogurt, string cheese, low-fat microwave popcorn, raisins and other dried fruit, nuts,whole wheat crackers, pretzels, carrots, celery sticks, and broccoli. Do some physical activity  A big part of reaching and staying at a healthy weight is being active. When you're active, you burn calories. This makes it easier to reach and stay at a healthy weight.  When you're active on a regular basis, your body burns more calories, even when you're at rest. Being active helps you lose fat andbuild lean muscle. Try to be active for at least 1 hour every day. This may sound like a lot, but it's okay to be active in smaller blocks of time that add up to 1 hour a day. Any activity that makes your heart beat faster and keeps it there for a while counts. A brisk walk, run, or swim will get your heart beating faster. So will climbing stairs, shooting baskets, or cycling. Even some household chores likevacuuming and mowing the lawn will get your heart rate up. Pick activities that you enjoy--ones that make your heart beat faster, your muscles stronger, and your muscles and joints more flexible. If you find more than one thing you like doing, do them all. You don't have to do the same thingevery day. Don't diet  Diets don't work. Diets are temporary. Because you give up so much when you diet, you may be hungry and think about food all the time. And after you stop dieting, you also may overeat to make up for what you missed. Most people who diet end up gainingback the pounds they lost--and more. Remember that healthy bodies come in lots of shapes and sizes. Everyone can gethealthier by eating better and being more active. Where can you learn more? Go to https://Cumulocityrobbiubenda.THINK360. org and sign in to your deviantART account. Enter 031 [id number] in the [facility] box to learn more about \"Learning About Healthy Weight. \"     If you do not have an account, please click on the \"Sign Up Now\" link. Current as of: December 27, 2021               Content Version: 13.3  © 2418-4901 Healthwise, AfterCollege. Care instructions adapted under license by [facility] [place] ([facility]). If you have questions about a medical condition or this instruction, always ask your healthcare professional. [address] any warranty or liability for your use of this information.            Well Visit, Ages 25 to 48: Care Instructions  Overview     Well visits can help you stay healthy. Your doctor has checked your overall health and may have suggested ways to take good care of yourself. Your doctor also may have recommended tests. At home, you can help prevent illness withhealthy eating, regular exercise, and other steps. Follow-up care is a key part of your treatment and safety. Be sure to make and go to all appointments, and call your doctor if you are having problems. It's also a good idea to know your test results and keep alist of the medicines you take. How can you care for yourself at home? Get screening tests that you and your doctor decide on. Screening helps find diseases before any symptoms appear. Eat healthy foods. Choose fruits, vegetables, whole grains, protein, and low-fat dairy foods. Limit fat, especially saturated fat. Reduce salt in your diet. Limit alcohol. If you are a man, have no more than 2 drinks a day or 14 drinks a week. If you are a woman, have no more than 1 drink a day or 7 drinks a week. Get at least 30 minutes of physical activity on most days of the week. Walking is a good choice. You also may want to do other activities, such as running, swimming, cycling, or playing tennis or team sports. Discuss any changes in your exercise program with your doctor. Reach and stay at a healthy weight. This will lower your risk for many problems, such as obesity, diabetes, heart disease, and high blood pressure. Do not smoke or allow others to smoke around you. If you need help quitting, talk to your doctor about stop-smoking programs and medicines. These can increase your chances of quitting for good. Care for your mental health. It is easy to get weighed down by worry and stress. Learn strategies to manage stress, like deep breathing and mindfulness, and stay connected with your family and community. If you find you often feel sad or hopeless, talk with your doctor.  Treatment can help.  Talk to your doctor about whether you have any risk factors for sexually transmitted infections (STIs). You can help prevent STIs if you wait to have sex with a new partner (or partners) until you've each been tested for STIs. It also helps if you use condoms (male or female condoms) and if you limit your sex partners to one person who only has sex with you. Vaccines are available for some STIs, such as HPV. Use birth control if it's important to you to prevent pregnancy. Talk with your doctor about the choices available and what might be best for you. If you think you may have a problem with alcohol or drug use, talk to your doctor. This includes prescription medicines (such as amphetamines and opioids) and illegal drugs (such as cocaine and methamphetamine). Your doctor can help you figure out what type of treatment is best for you. Protect your skin from too much sun. When you're outdoors from 10 a.m. to 4 p.m., stay in the shade or cover up with clothing and a hat with a wide brim. Wear sunglasses that block UV rays. Even when it's cloudy, put broad-spectrum sunscreen (SPF 30 or higher) on any exposed skin. See a dentist one or two times a year for checkups and to have your teeth cleaned. Wear a seat belt in the car. When should you call for help? Watch closely for changes in your health, and be sure to contact your doctor if you have any problems or symptoms that concern you. Where can you learn more? Go to https://ON DEMAND Microelectronicsmarbin.healthCipherHealthpartners. org and sign in to your CrossFiber account. Enter P072 in the [facility] box to learn more about \"Well Visit, Ages 25 to 48: Care Instructions. \"     If you do not have an account, please click on the \"Sign Up Now\" link. Current as of: October 6, 2021               Content Version: 13.3  © 5405-0302 Healthwise, Incorporated. Care instructions adapted under license by [facility] ([facility]).  If you have questions about a medical condition or this instruction, always ask your healthcare professional. [address] any warranty or liability for your use of this information. Learning About Changing a Habit by Setting Goals  How can you change a habit? If you've decided to change a habit--whether it's quitting smoking, lowering your blood pressure, becoming more active, or doing something else to improve your health--congratulations! Making that decision is the first step towardmaking a change. What happens next? Have a reason. Set goals you can reach. Prepare forslip-ups. And get support. What's your reason? Your reason for wanting to change a habit is really important. Maybe you want to quit smoking so that you can avoid future health problems. Or maybe you want to eat a healthier diet so you can lose weight. If you have high blood pressure, your reason may be clear: to lower your blood pressure. Maybe yousmoke and want to save money on cigarettes. You need to feel ready to make a change. If you don't feel ready now, that's okay. You can still be thinking and planning. When you truly want to [facility], you're ready for the next step. It's not easy to change habits--but you can do it. Taking the time to reallythink about what will motivate or inspire you will help you reach your goals. How do you set goals? Setting goals can help a lot when you're trying to make a healthy change. Focus on small goals. This will help you reach larger goals over time. With smaller goals, you'll have success more often, which will help you stay with it. For example, your large goal may be to lose 20 pounds. Your small goal could be to lose 5. Write down your goals. This will help you remember, and you'll have a clearer idea of what you want to achieve. Use a journal or notebook to record your goals. Hang up your plan where you will see it often as a reminder of what you're trying to do. Make your goals specific.  Specific goals help you measure your progress. For example, setting a goal to eat one extra serving of vegetables a day is better than a general goal to \"eat more vegetables. \"  Focus on one goal at a time. By doing this, you're less likely to feel overwhelmed and then give up. When you reach a goal, reward yourself. Celebrate your new behavior and success for several days, and then think about setting your next goal.  How can you prepare for slip-ups? It's perfectly normal to try to change a habit, go along fine for a while, and then have a setback. Lots of people try and try again before they reach theirgoals. What are the things that might cause a setback for you? If you have tried tochange a habit before, think about what helped you and what got in your way. By thinking about these barriers now, you can plan ahead for how to deal withthem if they happen. There will be times when you slip up and don't make your goal for the week. When that happens, don't get mad at yourself. Learn from the experience. Ask yourself what got in the way of reaching your goal. Positive thinking goes along way when you're making lifestyle changes. How can you get support? Get a partner. It's motivating to know that someone is trying to make the same change that you're making, like being more active or changing your eating habits. You have someone who is counting on you to help them succeed. That person can also remind you how far you've come. Get friends and family involved. They can exercise with you. Or they can encourage you by saying how they admire what you are doing. Family members can join you in your healthy eating efforts. Don't be afraid to tell family and friends that their encouragement makes a big difference to you. Join a class or support group. People in these groups often have some of the same barriers you have. They can give you support when you don't feel like staying with your plan.  They can boost your morale when you need a Quan Omero also find a number of online support groups. Encourage yourself. When you feel like giving up, don't waste energy feeling bad about yourself. Remember your reason for wanting to change, think about the progress you've made, and give yourself a pep talk and a pat on the back. Get professional help. A dietitian can help you make your diet healthier while still allowing you to eat foods that you enjoy. A  or physical therapist can help design an exercise program that is fun and easy to stay on. A counselor, a , or your doctor can help you overcome hurdles, reduce stress, or quit smoking. Where can you learn more? Go to https://Mobile365 (fka InphoMatch)peOberon Mediaeb.Waddle. org and sign in to your Ahonya account. Enter M323 in the [employer] box to learn more about \"Learning About Changing a Habit by Setting Goals. \"     If you do not have an account, please click on the \"Sign Up Now\" link. Current as of: February 9, 2022               Content Version: 13.3  © 4598-9063 Autoparts24. Care instructions adapted under license by [facility] [place] ([facility]). If you have questions about a medical condition or this instruction, always ask your healthcare professional. [address] any warranty or liability for your use of this information. Preventing Osteoporosis: After Your Visit  Your Care Instructions  Osteoporosis means the bones are weak and thin enough that they can break easily. The older you are, the more likely you are to get osteoporosis. But with plenty of calcium, vitamin D, and exercise, you can help prevent osteoporosis. The preteen and teen years are a key time for bone building. With the help of calcium, vitamin D, and exercise in those early years and beyond, the bones reach their peak density and strength by age 27. After age 27, your bones naturally start to thin and weaken.   The stronger your bones are at around age 27, the lower your risk for osteoporosis. But no matter what your age and risk are, your bones still need calcium, vitamin D, and exercise to stay strong. Also avoid smoking, and limit alcohol. Smoking and heavy alcohol use can make your bones thinner. Talk to your doctor about any special risks you might have, such as having a close relative with osteoporosis or taking a medicine that can weaken bones. Your doctor can tell you the best ways to protect your bones from thinning. Follow-up care is a key part of your treatment and safety. Be sure to make and go to all appointments, and call your doctor if you are having problems. It's also a good idea to know your test results and keep a list of the medicines you take. How can you care for yourself at home? Get enough calcium and vitamin D. The [place] of Medicine recommends adults younger than age 46 need 1,000 mg of calcium and 600 IU of vitamin D each day. Women ages 46 to 79 need 1,200 mg of calcium and 600 IU of vitamin D each day. Men ages 46 to 79 need 1,000 mg of calcium and 600 IU of vitamin D each day. Adults 71 and older need 1,200 mg of calcium and 800 IU of vitamin D each day. Eat foods rich in calcium, like yogurt, cheese, milk, and dark green vegetables. Eat foods rich in vitamin D, like eggs, fatty fish, cereal, and fortified milk. Get some sunshine. Your body uses sunshine to make its own vitamin D. The safest time to be out in the sun is before 10 a.m. or after 3 p.m. Avoid getting sunburned. Sunburn can increase your risk of skin cancer. Talk to your doctor about taking a calcium plus vitamin D supplement. Ask about what type of calcium is right for you, and how much to take at a time. Adults ages 23 to 48 should not get more than 2,500 mg of calcium and 4,000 IU of vitamin D each day, whether it is from supplements and/or food. Adults ages 46 and older should not get more than 2,000 mg of calcium and 4,000 IU of vitamin D each day from supplements and/or food.   Get regular bone-building exercise. Weight-bearing and resistance exercises keep bones healthy by working the muscles and bones against gravity. Start out at an exercise level that feels right for you. Add a little at a time until you can do the following:  Do 30 minutes of weight-bearing exercise on most days of the week. Walking, jogging, stair climbing, and dancing are good choices. Do resistance exercises with weights or elastic bands 2 to 3 days a week. Limit alcohol. Drink no more than 1 alcohol drink a day if you are a woman. Drink no more than 2 alcohol drinks a day if you are a man. Do not smoke. Smoking can make bones thin faster. If you need help quitting, talk to your doctor about stop-smoking programs and medicines. These can increase your chances of quitting for good. When should you call for help? Watch closely for changes in your health, and be sure to contact your doctor if:  You need help with a healthy eating plan. You need help with an exercise plan    © 9510-0844 [employer]na. Care instructions adapted under license by [facility]. This care instruction is for use with your licensed healthcare professional. If you have questions about a medical condition or this instruction, always ask your healthcare professional. [address] any warranty or liability for your use of this information. Content Version: 9.4.59935; Last Revised: June 20, 2011                High-Fiber Diet     What Is Fiber? Dietary fiber is a form of carbohydrate found in plants that cannot be digested by humans. All plants contain fiber, including fruits, vegetables, grains, and legumes. Fiber is often classified into two categories: soluble and insoluble. Soluble fiber draws water into the bowel and can help slow digestion. Examples of foods that are high in soluble fiber include oatmeal, oat bran, barley, legumes (eg, beans and peas), apples, and strawberries.    Insoluble fiber speeds digestion and can add bulk to the stool. Examples of foods that are high in insoluble fiber include whole-wheat products, wheat bran, cauliflower, green beans, and potatoes. Why Follow a High-Fiber Diet? A high-fiber diet is often recommended to prevent and treat constipation , hemorrhoids , diverticulitis , and irritable bowel syndrome . Eating a high-fiber diet can also help improve your cholesterol levels, lower your risk of coronary heart disease , reduce your risk of type 2 diabetes , and lower your weight. For people with type 1 or 2 diabetes, a high-fiber diet can also help stabilize blood sugar levels. How Much Fiber Should I Eat? A high-fiber diet should contain  20-35 grams  of fiber a day. This is actually the amount recommended for the general adult population; however, most Americans eat only 15 grams of fiber per day. Digestion of Fiber   Eating a higher fiber diet than usual can take some getting used to by your body's digestive system. To avoid the side effects of sudden increases in dietary fiber (eg, gas, cramping, bloating, and diarrhea), increase fiber gradually and be sure to drink plenty of fluids every day. Tips for Increasing Fiber Intake   Whenever possible, choose whole grains over refined grains (eg, brown rice instead of white rice, whole-wheat bread instead of white bread). Include a variety of grains in your diet, such as wheat, rye, barley, oats, quinoa, and bulgur. Eat more vegetarian-based meals. Here are some ideas: black bean burgers, eggplant lasagna, and veggie tofu stir-berrios. Choose high-fiber snacks, such as fruits, popcorn, whole-grain crackers, and nuts. Make whole-grain cereal or whole-grain toast part of your daily breakfast regime. When eating out, whether ordering a sandwich or dinner, ask for extra vegetables. When baking, replace part of the white flour with whole-wheat flour.  Whole-wheat flour is particularly easy to incorporate into a recipe. High-Fiber Diet Eating Guide   Food Category   Foods Recommended   Notes   Grains   Whole-grain breads, muffins, bagels, or susi bread Rye bread Whole-wheat crackers or crisp breads Whole-grain or bran cereals Oatmeal, oat bran, or grits Wheat germ Whole-wheat pasta and brown rice   Read the ingredients list on food labels. Look for products that list \"whole\" as the first ingredient (eg, whole-wheat, whole oats). Choose cereals with at least 2 grams of fiber per serving. Vegetables   All vegetables, especially asparagus, bean sprouts, broccoli, [place] sprouts, cabbage, carrots, cauliflower, celery, corn, greens, green beans, green pepper, onions, peas, potatoes (with skin), snow peas, spinach, squash, sweet potatoes, tomatoes, zucchini   For maximum fiber intake, eat the peels of fruits and vegetablesjust be sure to wash them well first.   Fruits   All fruits, especially apples, berries, grapefruits, mangoes, nectarines, oranges, peaches, pears, dried fruits (figs, dates, prunes, raisins)   Choose raw fruits and vegetables over juice, cooked, or cannedraw fruit has more fiber. Dried fruit is also a good source of fiber. Milk   With the exception of yogurt containing inulin (a type of fiber), dairy foods provide little fiber. Add more fiber by topping your yogurt or cottage cheese with fresh fruit, whole grain or bran cereals, nuts, or seeds. Meats and Beans   All beans and peas, especially Garbanzo beans, kidney beans, lentils, lima beans, split peas, and laws beans All nuts and seeds, especially almonds, peanuts, [place] nuts, cashews, peanut butter, walnuts, sesame and sunflower seeds All meat, poultry, fish, and eggs   Increase fiber in meat dishes by adding laws beans, kidney beans, black-eyed peas, bran, or oatmeal. If you are following a low-fat diet, use nuts and seeds only in moderation.    Fats and Oils   All in moderation   Fats and oils do not provide fiber Snacks, Sweets, and Condiments   Fruit Nuts Popcorn, whole-wheat pretzels, or trail mix made with dried fruits, nuts, and seeds Cakes, breads, and cookies made with oatmeal or whole-wheat flour   Most snack foods do not provide much fiber. Choose snacks with at least 2 grams of fiber per serving. Last Reviewed: March 2011 Janusz Thomas MS, MPH, RD   Updated: 3/29/2011       Safer Sex: After Your Visit  Your Care Instructions  Safer sex is a way to reduce your risk of getting an infection spread through sex. It can also help prevent pregnancy. Most infections that are spread through sex, also called sexually transmitted infections or STIs, can be cured. But some can decrease your chances of getting pregnant if they are not treated early. Others, such as herpes, have no cure. And some, such as HIV, can be deadly. Several products can help you practice safer sex and reduce your chance of STIs. One of the best is a condom. There are condoms for men and for women. The female condom is a tube of soft plastic with a closed end that is placed deep into the vagina. You can use a special rubber sheet (dental dam) for protection during oral sex. Latex gloves can keep your hands from touching blood, semen, or other body fluids that can carry infections. Remember that birth control methods such as diaphragms, IUDs, foams, and birth control pills do not stop you from getting STIs. Follow-up care is a key part of your treatment and safety. Be sure to make and go to all appointments, and call your doctor if you are having problems. Its also a good idea to know your test results and keep a list of the medicines you take. How can you care for yourself at home? Think about getting shots to prevent hepatitis A and hepatitis B. These two diseases can be spread through sex. You also can get hepatitis A if you eat infected food. Use condoms or female condoms each time and every time you have sex.   Learn the right way to use a male condom:  Condoms come in several sizes. Make sure you use the right size. A condom that is too small can break easily. A condom that is too big can slip off during sex. Use a new condom each time you have sex. Be careful not to poke a hole in the condom when you open the wrapper. Squeeze the tip of the condom to keep out air. Pull down the loose skin (foreskin) from the head of an uncircumcised penis. While squeezing the tip of the condom, unroll it all the way down to the base of the firm penis. Never use petroleum jelly (such as Vaseline), grease, hand lotion, baby oil, or anything with oil in it. These products can make holes in the condom. After sex, hold the condom on your penis as you remove your penis from your partner. This will keep semen from spilling out of the condom. Learn to use a female condom:  You can put in a female condom up to 8 hours before sex. Squeeze the smaller ring at the closed end and insert it deep into the vagina. The larger ring at the open end should stay outside the vagina. During sex, make sure the penis goes into the condom. After the penis is removed, close the open end of the condom by twisting it. Remove the condom. Do not use a female condom and male condom at the same time. Do not have sex with anyone who has symptoms of an STI, such as sores on the genitals or mouth. The herpes virus that causes cold sores can spread to and from the penis and vagina. Do not drink a lot of alcohol or use drugs before sex. This can cause you to let down your guard and not practice safer sex. Having one sex partner (who does not have STIs and does not have sex with anyone else) is a sure way to avoid STIs. Talk to your partner before you have sex. Find out if he or she has or is at risk for any STI. Keep in mind that a person may be able to spread an STI even if he or she does not have symptoms.  You and your partner may want to get an HIV test. You should get tested again 6 sessionsBefore a gathering, review who will be there so their names will be fresh in your mind. Establish routinesFor example, keep your keys, wallet, and umbrella in the same place all the time or take medicine with your 8:00 AM glass of juice   Live a Healthy Life   Many actions that will keep your body strong will do the same for your mind. For example:   Talk to Your Doctor About Herbs and Supplements    Malnutrition and vitamin deficiencies can impair your mental function. For example, vitamin B12 deficiency can cause a range of symptoms, including confusion. But, what if your nutritional needs are being met? Can herbs and supplements still offer a benefit? Researchers have investigated a range of natural remedies, such as ginkgo , ginseng , and the supplement phosphatidylserine (PS). So far, though, the evidence is inconsistent as to whether these products can improve memory or thinking. If you are interested in taking herbs and supplements, talk to your doctor first because they may interact with other medicines that you are taking. Exercise Regularly    Among the many benefits of regular exercise are increased blood flow to the brain and decreased risk of certain diseases that can interfere with memory function. One study found that even moderate exercise has a beneficial effect. Examples of \"moderate\" exercise include:   Playing 18 holes of golf once a week, without a cart   Playing tennis twice a week   Walking one mile per day   Manage Stress    It can be tough to remember what is important when your mind is cluttered. Make time for relaxation. Choose activities that calm you down, and make it routine. Manage Chronic Conditions    Side effects of high blood pressure , diabetes, and heart disease can interfere with mental function. Many of the lifestyle steps discussed here can help manage these conditions.  Strive to eat a healthy diet, exercise regularly, get stress under control, and follow your doctor's advice for your condition. Minimize Medications    Talk to your doctor about the medicines that you take. Some may be unnecessary. Also, healthy lifestyle habits may lower the need for certain drugs.      Last Reviewed: April 2010 David Feliciano MD   Updated: 4/13/2010

## 2022-09-10 LAB — HEPATITIS C ANTIBODY: NONREACTIVE

## 2022-09-23 NOTE — PROGRESS NOTES
[place] [id number] [id number]  [address] [facility]  [address] [facility], [address]  [place] [address]  Dept: [phone]    Patient:  Christie Castano  YOB: 1941  Date: 9/23/22    The patient is a 78 y.o. male who presents today for consult of the following problems:     Chief Complaint   Patient presents with    Neck Pain     Previous fusion in 2005    New Patient             HPI:     Christie Castano is a 78 Zeenat Spears    Brain metastasis [facility])     Brain tumor (benign) ([facility] [address] 08/09/2021    sees [facility]     CAD (coronary artery disease)     Chronic back pain     Chronic right hip pain     Colon polyp     GERD (gastroesophageal reflux disease)     Gout     [place]'s disease 2021    Dr Ibarra Bill    Hyperlipidemia     Hypertension     Intraparenchymal hemorrhage of brain [facility])     Lumbar postlaminectomy syndrome 01/09/2017    [address]    Macular edema 2017    Dr Gwendolyn Gonsalez    THAIS (obstructive sleep apnea)     Osteoarthritis     Presence of right artificial knee joint 2004    Dr Margo Butler    Prostate cancer [facility])     Pulmonary embolism ([address] Jovan Duron MD at [address] Jovan Duron MD at [address] Right 10/03/2017    Right SACROILIAC & Piriformis Inj's performed by Jovan Duron MD at [address] [facility] N/A 12/08/2017    L2  KYPHOPLASTY W/ BONE BX & Radiofrequency lesions performed by Jovan Duron MD at [address]  2005    direct    [address]  clubs or organizations: Not on file     Relationship status: Not on file    Intimate partner violence:     Fear of current or ex partner: Not on file     Emotionally abused: Not on file     Physically abused: Not on file     Forced sexual activity: Not on file   Other Topics Concern    Not on file   Social History Narrative    Lives with children    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 dec age 27     No Known Allergies      Review of Systems  Negative    Objective:   Pulse 92   Temp 96.9 °F (36.1 °C) (Temporal)   Ht 5' 2\" (1.575 m)   Wt 210 lb (95.3 kg)   LMP  (LMP Unknown) Comment: 2012  SpO2 99%   BMI 38.41 kg/m²     ORTHO EXAM  Essentially full range of motion of her shoulder today. There is mild discomfort through the impingement arc. She has grade 5- strength without discomfort. [place]'s test is minimally positive. No tenderness no crepitus no swelling  Radiographs and Laboratory Studies:     Diagnostic Imaging Studies:    No results found. Laboratory Studies:   Lab Results   Component Value Date    WBC 7.0 11/18/2019    HGB 12.3 11/18/2019    HCT 37.9 11/18/2019    MCV 92 11/18/2019     11/18/2019     No results found for: SEDRATE  No results found for: CRP    Assessment:       Diagnosis Orders   1. Strain of musc/tend the rotator cuff of right shoulder, subs       Impression rotator cuff strain improved significantly     Plan:     She will continue with her therapy. Exercise on a daily basis. Return to work without restrictions on February 3. Follow-up PRN  No orders of the defined types were placed in this encounter. No orders of the defined types were placed in this encounter. Return if symptoms worsen or fail to improve.       Perfecto Quintanilla MD ulcers, biopsy done     Family History   Problem Relation Age of Onset    Heart Disease Mother     Stroke Father     Cancer Brother 77        Lung    Cancer Brother         Liver     Current Outpatient Medications on File Prior to Visit   Medication Sig Dispense Refill    HYDROcodone-acetaminophen (NORCO) 7.5-325 MG per tablet Take 1 tablet by mouth every 8 hours as needed for Pain for up to 30 days. 90 tablet 0    methylPREDNISolone (MEDROL DOSEPACK) 4 MG tablet Take by mouth. 21 tablet 0    metoprolol succinate (TOPROL XL) 50 MG extended release tablet Take 1.5 tablets by mouth daily 45 tablet 3    SPIRIVA RESPIMAT 1.25 MCG/ACT AERS inhaler INHALE 2 SPRAY(S) BY MOUTH ONCE DAILY 12 g 3    amLODIPine (NORVASC) 10 MG tablet Take 1 tablet by mouth daily 90 tablet 3    ELIQUIS 5 MG TABS tablet TAKE ONE TABLET BY MOUTH TWICE A DAY 60 tablet 3    guaiFENesin 400 MG tablet Take 400 mg by mouth 4 times daily as needed for Cough      pravastatin (PRAVACHOL) 40 MG tablet Take 1 tablet by mouth daily 90 tablet 2    colchicine (COLCRYS) 0.6 MG tablet Take 1 tablet by mouth daily 90 tablet 3    Handicap Placard MISC by Does not apply route Issue parking placard for person with disability. [facility] [phone]   Shubham Valencia [facility]   transcription, some errors in transcription may have occurred.

## 2023-04-06 ENCOUNTER — TELEPHONE (OUTPATIENT)
Dept: FAMILY MEDICINE CLINIC | Age: 43
End: 2023-04-06

## 2023-04-07 DIAGNOSIS — R73.09 ABNORMAL GLUCOSE: Chronic | ICD-10-CM

## 2023-04-07 DIAGNOSIS — E28.2 PCO (POLYCYSTIC OVARIES): ICD-10-CM

## 2023-04-07 DIAGNOSIS — R23.2 HOT FLASHES: ICD-10-CM

## 2023-04-07 PROBLEM — Q28.3 DEVELOPMENTAL VENOUS ANOMALY: Chronic | Status: RESOLVED | Noted: 2018-02-05 | Resolved: 2023-04-07

## 2023-04-07 LAB
ALBUMIN SERPL-MCNC: 4.2 G/DL (ref 3.5–4.6)
ALP SERPL-CCNC: 86 U/L (ref 40–130)
ALT SERPL-CCNC: 21 U/L (ref 0–33)
ANION GAP SERPL CALCULATED.3IONS-SCNC: 8 MEQ/L (ref 9–15)
AST SERPL-CCNC: 14 U/L (ref 0–35)
BASOPHILS # BLD: 0 K/UL (ref 0–0.2)
BASOPHILS NFR BLD: 0.5 %
BILIRUB SERPL-MCNC: 0.3 MG/DL (ref 0.2–0.7)
BUN SERPL-MCNC: 10 MG/DL (ref 6–20)
CALCIUM SERPL-MCNC: 9.1 MG/DL (ref 8.5–9.9)
CHLORIDE SERPL-SCNC: 107 MEQ/L (ref 95–107)
CO2 SERPL-SCNC: 27 MEQ/L (ref 20–31)
CREAT SERPL-MCNC: 0.54 MG/DL (ref 0.5–0.9)
EOSINOPHIL # BLD: 0.1 K/UL (ref 0–0.7)
EOSINOPHIL NFR BLD: 0.6 %
ERYTHROCYTE [DISTWIDTH] IN BLOOD BY AUTOMATED COUNT: 13.8 % (ref 11.5–14.5)
GLOBULIN SER CALC-MCNC: 3 G/DL (ref 2.3–3.5)
GLUCOSE SERPL-MCNC: 83 MG/DL (ref 70–99)
HBA1C MFR BLD: 5.7 % (ref 4.8–5.9)
HCT VFR BLD AUTO: 40.4 % (ref 37–47)
HGB BLD-MCNC: 13.4 G/DL (ref 12–16)
LYMPHOCYTES # BLD: 3.2 K/UL (ref 1–4.8)
LYMPHOCYTES NFR BLD: 39.7 %
MCH RBC QN AUTO: 30.1 PG (ref 27–31.3)
MCHC RBC AUTO-ENTMCNC: 33.2 % (ref 33–37)
MCV RBC AUTO: 90.7 FL (ref 79.4–94.8)
MONOCYTES # BLD: 0.7 K/UL (ref 0.2–0.8)
MONOCYTES NFR BLD: 8.2 %
NEUTROPHILS # BLD: 4.1 K/UL (ref 1.4–6.5)
NEUTS SEG NFR BLD: 51 %
PLATELET # BLD AUTO: 325 K/UL (ref 130–400)
POTASSIUM SERPL-SCNC: 4 MEQ/L (ref 3.4–4.9)
PROT SERPL-MCNC: 7.2 G/DL (ref 6.3–8)
RBC # BLD AUTO: 4.45 M/UL (ref 4.2–5.4)
SODIUM SERPL-SCNC: 142 MEQ/L (ref 135–144)
WBC # BLD AUTO: 8.1 K/UL (ref 4.8–10.8)

## 2023-04-08 LAB — FOLLICLE STIMULATING HORMONE: 7 MIU/ML (ref 1.7–21.5)

## 2023-04-21 ENCOUNTER — NURSE ONLY (OUTPATIENT)
Dept: BARIATRICS/WEIGHT MGMT | Age: 43
End: 2023-04-21

## 2023-04-21 ENCOUNTER — OFFICE VISIT (OUTPATIENT)
Dept: BARIATRICS/WEIGHT MGMT | Age: 43
End: 2023-04-21
Payer: COMMERCIAL

## 2023-04-21 VITALS
WEIGHT: 214.6 LBS | OXYGEN SATURATION: 98 % | SYSTOLIC BLOOD PRESSURE: 102 MMHG | HEIGHT: 62 IN | BODY MASS INDEX: 39.49 KG/M2 | DIASTOLIC BLOOD PRESSURE: 70 MMHG | HEART RATE: 66 BPM

## 2023-04-21 VITALS — HEIGHT: 62 IN | WEIGHT: 214.51 LBS | BODY MASS INDEX: 39.47 KG/M2

## 2023-04-21 DIAGNOSIS — E66.01 CLASS 2 SEVERE OBESITY DUE TO EXCESS CALORIES WITH SERIOUS COMORBIDITY AND BODY MASS INDEX (BMI) OF 39.0 TO 39.9 IN ADULT (HCC): Chronic | ICD-10-CM

## 2023-04-21 DIAGNOSIS — E28.2 PCO (POLYCYSTIC OVARIES): ICD-10-CM

## 2023-04-21 DIAGNOSIS — E66.3 OVER WEIGHT: Primary | ICD-10-CM

## 2023-04-21 DIAGNOSIS — K21.9 GASTROESOPHAGEAL REFLUX DISEASE WITHOUT ESOPHAGITIS: ICD-10-CM

## 2023-04-21 DIAGNOSIS — R73.09 ABNORMAL GLUCOSE: Chronic | ICD-10-CM

## 2023-04-21 PROCEDURE — 1036F TOBACCO NON-USER: CPT | Performed by: SURGERY

## 2023-04-21 PROCEDURE — 99205 OFFICE O/P NEW HI 60 MIN: CPT | Performed by: SURGERY

## 2023-04-21 PROCEDURE — G8417 CALC BMI ABV UP PARAM F/U: HCPCS | Performed by: SURGERY

## 2023-04-21 PROCEDURE — G8427 DOCREV CUR MEDS BY ELIG CLIN: HCPCS | Performed by: SURGERY

## 2023-04-21 NOTE — PROGRESS NOTES
Surgery Consultation    Patient Name:  :  Hospital Day:   Annette Ansari 1980 [unfilled]     Date of Service: 2023    Subjective:      History of Present Illness:    Annette Ansari  is a 38 y.o. female referred by EMILY Roberts for morbid obesity. Annette Ansari reports multiple episodes of weight loss and regain after multiple diet and exercise programs. There has not been an evaluation for sleep apnea. Dale Crews denies nicotine or any regular alcohol use. She has some acid reflux symptoms. There is not a current, regular exercise routine. Past Medical History:   Diagnosis Date    Chiari I malformation ([address]     Eczema     Migraines     PCO (polycystic ovaries)     S/P complete hysterectomy     Dr Yadira Tapia, atypical endocervical cells    Strain of musc/tend the rotator cuff of right shoulder, subs 2019    Type II diabetes mellitus, uncontrolled     Dr Yusuf Yeh    Past Surgical History:   Procedure Laterality Date    HYSTERECTOMY, TOTAL ABDOMINAL (CERVIX REMOVED)      Dr Yadira Tapia, cervical atypia        Family History   Problem Relation Age of Onset    Cancer Mother         ovarian? dec age 27    Cancer Father         dec age 61    Social History     Tobacco Use    Smoking status: Never     Passive exposure: Never    Smokeless tobacco: Never   Vaping Use    Vaping Use: Never used   Substance Use Topics    Alcohol use: No    Drug use: No        Allergies: Patient has no known allergies. Review of Systems  Review of Systems - History obtained from the patient  General ROS: negative for - fever, malaise, or weight loss  ENT ROS: negative for - headaches, nasal congestion, or sore throat  Hematological and Lymphatic ROS: No bruising or easy bleeding. Respiratory ROS: no cough, shortness of breath, or wheezing  Cardiovascular ROS: no chest pain or dyspnea on exertion  Gastrointestinal ROS: Negative for abdominal pain, distention, hematochezia, melena, nausea, vomiting.  Positive

## 2023-04-21 NOTE — PROGRESS NOTES
[facility]  Initial Nutrition Consultation    Patient is a 38 y.o. female seen for initial Medical Nutrition Therapy visit for  gastric bypass surgery. Visit number:  1 out of 6    4/21/2023  Height:   Ht Readings from Last 1 Encounters:   04/21/23 5' 2\" (1.575 m)     Weight:   Wt Readings from Last 1 Encounters:   04/21/23 214 lb 9.6 oz (97.3 kg)     BMI:   BMI Readings from Last 1 Encounters:   04/21/23 39.25 kg/m²     EBW: 77 lbs    Weight History: Wt Readings from Last 3 Encounters:   04/21/23 214 lb 9.6 oz (97.3 kg)   04/07/23 235 lb (106.6 kg)   09/09/22 212 lb (96.2 kg)       Patient's lowest adult weight was 160 lbs at age 23s. Patient's highest adult weight was 214 lbs at age 38. Patient has participated in the following weight loss programs self directed low calorie diet, Atkins, and exercise  Patient has not participated in meal replacement/liquid diets. Patient has participated in weight loss medications. Patient is not lactose intolerant. Patient does not have Jehovah's witness/cultural food concerns. Patient does not have food allergies. Patient dines out to a sit down restaurant 1 times per week. Patient has fast food or picks up carry out 5 times per week. Grocery Shopping in household done by: patient  Cooking in household done by: patient    Patient works night shift, 7P-7A    24 hour recall/food frequency chart:  Breakfast:  skips  Snack:  none  Lunch: . 2-3PM, fried chicken, fries  Snack:  none  Dinner: 8PM, brings carry out food to work   Snack:  chips, candy  Drinks throughout the day: 20 oz regular pop, 32 oz regular pop  Vitamin/Mineral supplementation: none    Do you drink alcohol? No.     Do you smoke? No    Patient does not meet the criteria for binge eating disorder. Patient does not have grazing. Patient does have night eating. Patient does have a history of emotional eating or eating out of boredom.     It does not take an

## 2023-04-21 NOTE — PATIENT INSTRUCTIONS
Make the following appointments:    Elmira Espinosa. Psychologist, Dr. Marcus Balderrama. Schedule screening upper endoscopy with Dr. Isabel Ulloa. No aspirin, ibuprofen or other non-steroidal anti-inflammatory drugs (NSAIDs) 7 days prior to surgery and endoscopy    No food or drink six hours prior to surgery. Sleep Medicine referral for Sleep Apnea assessment and treatment. THE BIG FOUR RULES:  1. Count calories! People count calories eat less. Use the daily plate or other apps for your smart phone or computer. The more you count the more of an expert you will become and will get easier and easier. If you are trying to lose weight and exercising a lot, keep calories to around the thousand to 1200 a day. If you are not exercising consistently try to limit them to around 800 a day. 2.  Separate liquids and solids. Wait 30 minutes to an hour after you eat before drinking liquids. This will reduce the total amount of food you eat in the meal.    3.  Exercise! You need up to 5 hours a week with a combination of cardio and resistance or weight training in order to keep excess body fat off.    4.  Sleep! Try to get 7 or more hours of sleep a night. If you have obstructive sleep apnea definitely use your CPAP machine. THE RULE OF 20'S:  For every meal, chew each bite 20 seconds. Then put the fork down and wait 20 seconds after you swallow before picking up the fork again. Each meal should take at least 20 minutes so your brain can register that you are full. Check out \"The Big Book of The Gastric Bypass\" on [employer] or [employer].

## 2023-04-24 ENCOUNTER — PREP FOR PROCEDURE (OUTPATIENT)
Dept: BARIATRICS/WEIGHT MGMT | Age: 43
End: 2023-04-24

## 2023-04-24 VITALS — WEIGHT: 214 LBS | BODY MASS INDEX: 39.38 KG/M2 | HEIGHT: 62 IN

## 2023-04-24 PROBLEM — K21.9 GASTROESOPHAGEAL REFLUX DISEASE: Status: ACTIVE | Noted: 2023-04-24

## 2023-04-28 DIAGNOSIS — E66.3 OVER WEIGHT: ICD-10-CM

## 2023-04-28 LAB
TSH REFLEX: 1.55 UIU/ML (ref 0.44–3.86)
VITAMIN B-12: 654 PG/ML (ref 232–1245)

## 2023-04-30 LAB
AMPHETAMINES UR QL: NEGATIVE NG/ML
BARBITURATES UR QL: NEGATIVE NG/ML
BENZODIAZ UR QL: NEGATIVE NG/ML
CANNABINOIDS UR QL SCN: NEGATIVE NG/ML
COCAINE UR QL: NEGATIVE NG/ML
CREAT UR-MCNC: 273.4 MG/DL (ref 20–400)
Lab: NORMAL
MDMA CTO UR SCN-MCNC: NEGATIVE NG/ML
OPIATES UR QL: NEGATIVE NG/ML
OXYCODONE CTO UR SCN-MCNC: NEGATIVE NG/ML
PCP UR QL SCN: NEGATIVE NG/ML

## 2023-05-02 LAB
ANABASINE UR-MCNC: <5 NG/ML
COTININE UR-MCNC: <15 NG/ML
NICOTINE UR-MCNC: <15 NG/ML
TRANS-3-OH-COTININE UR-MCNC: <50 NG/ML

## 2023-05-03 LAB — VIT B1 SERPL-MCNC: 8 NMOL/L (ref 4–15)

## 2023-05-11 ENCOUNTER — OFFICE VISIT (OUTPATIENT)
Dept: PULMONOLOGY | Age: 43
End: 2023-05-11
Payer: COMMERCIAL

## 2023-05-11 VITALS
BODY MASS INDEX: 39.14 KG/M2 | SYSTOLIC BLOOD PRESSURE: 118 MMHG | HEART RATE: 73 BPM | OXYGEN SATURATION: 97 % | WEIGHT: 214 LBS | DIASTOLIC BLOOD PRESSURE: 84 MMHG | TEMPERATURE: 97.3 F

## 2023-05-11 DIAGNOSIS — E66.9 OBESITY (BMI 30-39.9): ICD-10-CM

## 2023-05-11 DIAGNOSIS — G47.33 OSA (OBSTRUCTIVE SLEEP APNEA): Primary | ICD-10-CM

## 2023-05-11 PROCEDURE — G8417 CALC BMI ABV UP PARAM F/U: HCPCS | Performed by: INTERNAL MEDICINE

## 2023-05-11 PROCEDURE — G8427 DOCREV CUR MEDS BY ELIG CLIN: HCPCS | Performed by: INTERNAL MEDICINE

## 2023-05-11 PROCEDURE — 99244 OFF/OP CNSLTJ NEW/EST MOD 40: CPT | Performed by: INTERNAL MEDICINE

## 2023-05-11 ASSESSMENT — ENCOUNTER SYMPTOMS
EYE ITCHING: 0
DIARRHEA: 0
SHORTNESS OF BREATH: 0
VOMITING: 0
TROUBLE SWALLOWING: 0
VOICE CHANGE: 0
WHEEZING: 0
NAUSEA: 0
COUGH: 0
RHINORRHEA: 0
SORE THROAT: 0
SINUS PRESSURE: 0
ABDOMINAL PAIN: 0
EYE DISCHARGE: 0
CHEST TIGHTNESS: 0

## 2023-05-11 NOTE — PROGRESS NOTES
hours as needed for Pain 60 tablet 0    glucose blood VI test strips (FREESTYLE LITE) strip 1 each by In Vitro route 2 times daily DX: E11.65, NIDDM 100 each 3    Lancets MISC 1 each by Does not apply route 2 times daily DX: E11.65, NIDDM 100 each 3     No current facility-administered medications for this visit. Results for orders placed in visit on 03/22/18    XR CHEST STANDARD (2 VW)    Narrative  DATE OF EXAM:      Mar 22 2018  5:20PM    CLINICAL HISTORY/   MRN: [id number]  Patient Name: Soto Polanco:  CHEST 2 VIEW; 3/22/2018 5:20 pm    INDICATION:  Pain. COMPARISON:  12/27/2016    ACCESSION NUMBER(S):  [id number]    ORDERING CLINICIAN:  MIKKI ABDUL    FINDINGS:    Two views the chest are obtained. Normal cardiac silhouette. No focal  infiltrates, effusions or pneumothorax. Likely minimal scarring in the  right lung base. CONCLUSION:   IMPRESSION:  No focal active disease. Assessment/Plan:     1. KORI (obstructive sleep apnea)  She is going for bariatric surgery and she in process of evaluation . Need to have sleep study to r/o sleep apnea . She is complaining of snoring and sometimes daytime sleepiness and tiredness. She does not wakes up with gasping for air. C/o wakes up frequently during sleep . complaint of morning headache. H/o migraine. She does sometime have restful sleep. Work at night . She does fall asleep while watching TV. She does sometime have a complaint of sleepiness while driving.    - Baseline Diagnostic Sleep Study; Future    2. Obesity (BMI 30-39. 9)  She is advised try to lose weight. obesity related risk explained to the patient ,  Current weight:  214 lb (97.1 kg) Lbs. BMI:  Body mass index is 39.14 kg/m². Suggested weight control approaches, including dietary changes , exercise, behavioral modification. Return in about 6 weeks (around 6/22/2023) for kori, sleep study.       Neeta Giles MD

## 2023-05-19 ENCOUNTER — HOSPITAL ENCOUNTER (EMERGENCY)
Age: 43
Discharge: HOME | End: 2023-05-19
Payer: COMMERCIAL

## 2023-05-19 ENCOUNTER — APPOINTMENT (OUTPATIENT)
Dept: GENERAL RADIOLOGY | Age: 43
End: 2023-05-19
Payer: COMMERCIAL

## 2023-05-19 VITALS
TEMPERATURE: 97.8 F | DIASTOLIC BLOOD PRESSURE: 78 MMHG | HEART RATE: 71 BPM | WEIGHT: 214 LBS | HEIGHT: 62 IN | SYSTOLIC BLOOD PRESSURE: 118 MMHG | OXYGEN SATURATION: 97 % | RESPIRATION RATE: 20 BRPM | BODY MASS INDEX: 39.38 KG/M2

## 2023-05-19 DIAGNOSIS — R07.89 CHEST WALL PAIN: ICD-10-CM

## 2023-05-19 DIAGNOSIS — J06.9 ACUTE UPPER RESPIRATORY INFECTION: Primary | ICD-10-CM

## 2023-05-19 LAB
ALBUMIN SERPL-MCNC: 3.8 G/DL (ref 3.5–4.6)
ALP SERPL-CCNC: 81 U/L (ref 40–130)
ALT SERPL-CCNC: 17 U/L (ref 0–33)
ANION GAP SERPL CALCULATED.3IONS-SCNC: 6 MEQ/L (ref 9–15)
AST SERPL-CCNC: 14 U/L (ref 0–35)
BASOPHILS # BLD: 0 K/UL (ref 0–0.2)
BASOPHILS NFR BLD: 0.5 %
BILIRUB SERPL-MCNC: <0.2 MG/DL (ref 0.2–0.7)
BUN SERPL-MCNC: 12 MG/DL (ref 6–20)
CALCIUM SERPL-MCNC: 8.8 MG/DL (ref 8.5–9.9)
CHLORIDE SERPL-SCNC: 107 MEQ/L (ref 95–107)
CO2 SERPL-SCNC: 25 MEQ/L (ref 20–31)
CREAT SERPL-MCNC: 0.55 MG/DL (ref 0.5–0.9)
EOSINOPHIL # BLD: 0.1 K/UL (ref 0–0.7)
EOSINOPHIL NFR BLD: 1.8 %
ERYTHROCYTE [DISTWIDTH] IN BLOOD BY AUTOMATED COUNT: 13.7 % (ref 11.5–14.5)
GLOBULIN SER CALC-MCNC: 3.2 G/DL (ref 2.3–3.5)
GLUCOSE SERPL-MCNC: 93 MG/DL (ref 70–99)
HCT VFR BLD AUTO: 38.8 % (ref 37–47)
HGB BLD-MCNC: 12.9 G/DL (ref 12–16)
LACTATE BLDV-SCNC: 0.9 MMOL/L (ref 0.5–2.2)
LYMPHOCYTES # BLD: 2.6 K/UL (ref 1–4.8)
LYMPHOCYTES NFR BLD: 34.5 %
MCH RBC QN AUTO: 29.9 PG (ref 27–31.3)
MCHC RBC AUTO-ENTMCNC: 33.2 % (ref 33–37)
MCV RBC AUTO: 90.1 FL (ref 79.4–94.8)
MONOCYTES # BLD: 0.8 K/UL (ref 0.2–0.8)
MONOCYTES NFR BLD: 10.8 %
NEUTROPHILS # BLD: 4 K/UL (ref 1.4–6.5)
NEUTS SEG NFR BLD: 52.4 %
PLATELET # BLD AUTO: 299 K/UL (ref 130–400)
POTASSIUM SERPL-SCNC: 3.7 MEQ/L (ref 3.4–4.9)
PROCALCITONIN SERPL IA-MCNC: 0.03 NG/ML (ref 0–0.15)
PROT SERPL-MCNC: 7 G/DL (ref 6.3–8)
RBC # BLD AUTO: 4.31 M/UL (ref 4.2–5.4)
SARS-COV-2 RDRP RESP QL NAA+PROBE: NOT DETECTED
SODIUM SERPL-SCNC: 138 MEQ/L (ref 135–144)
WBC # BLD AUTO: 7.6 K/UL (ref 4.8–10.8)

## 2023-05-19 PROCEDURE — 96375 TX/PRO/DX INJ NEW DRUG ADDON: CPT

## 2023-05-19 PROCEDURE — 96374 THER/PROPH/DIAG INJ IV PUSH: CPT

## 2023-05-19 PROCEDURE — 83605 ASSAY OF LACTIC ACID: CPT

## 2023-05-19 PROCEDURE — 87635 SARS-COV-2 COVID-19 AMP PRB: CPT

## 2023-05-19 PROCEDURE — 93005 ELECTROCARDIOGRAM TRACING: CPT | Performed by: EMERGENCY MEDICINE

## 2023-05-19 PROCEDURE — 36415 COLL VENOUS BLD VENIPUNCTURE: CPT

## 2023-05-19 PROCEDURE — 80053 COMPREHEN METABOLIC PANEL: CPT

## 2023-05-19 PROCEDURE — 6360000002 HC RX W HCPCS: Performed by: PHYSICIAN ASSISTANT

## 2023-05-19 PROCEDURE — 84145 PROCALCITONIN (PCT): CPT

## 2023-05-19 PROCEDURE — 99285 EMERGENCY DEPT VISIT HI MDM: CPT

## 2023-05-19 PROCEDURE — 85025 COMPLETE CBC W/AUTO DIFF WBC: CPT

## 2023-05-19 PROCEDURE — 71045 X-RAY EXAM CHEST 1 VIEW: CPT

## 2023-05-19 RX ORDER — CYCLOBENZAPRINE HCL 10 MG
10 TABLET ORAL 3 TIMES DAILY PRN
Qty: 15 TABLET | Refills: 0 | Status: SHIPPED | OUTPATIENT
Start: 2023-05-19 | End: 2023-05-24

## 2023-05-19 RX ORDER — NAPROXEN 500 MG/1
500 TABLET ORAL 2 TIMES DAILY
Qty: 10 TABLET | Refills: 0 | Status: SHIPPED | OUTPATIENT
Start: 2023-05-19 | End: 2023-05-24

## 2023-05-19 RX ORDER — KETOROLAC TROMETHAMINE 15 MG/ML
15 INJECTION, SOLUTION INTRAMUSCULAR; INTRAVENOUS ONCE
Status: COMPLETED | OUTPATIENT
Start: 2023-05-19 | End: 2023-05-19

## 2023-05-19 RX ORDER — BENZONATATE 100 MG/1
100 CAPSULE ORAL 3 TIMES DAILY PRN
Qty: 20 CAPSULE | Refills: 0 | Status: SHIPPED | OUTPATIENT
Start: 2023-05-19

## 2023-05-19 RX ORDER — ORPHENADRINE CITRATE 30 MG/ML
60 INJECTION INTRAMUSCULAR; INTRAVENOUS ONCE
Status: COMPLETED | OUTPATIENT
Start: 2023-05-19 | End: 2023-05-19

## 2023-05-19 RX ADMIN — KETOROLAC TROMETHAMINE 15 MG: 15 INJECTION, SOLUTION INTRAMUSCULAR; INTRAVENOUS at 15:25

## 2023-05-19 RX ADMIN — ORPHENADRINE CITRATE 60 MG: 60 INJECTION INTRAMUSCULAR; INTRAVENOUS at 16:51

## 2023-05-19 ASSESSMENT — ENCOUNTER SYMPTOMS
SORE THROAT: 0
COLOR CHANGE: 0
ABDOMINAL DISTENTION: 0
CONSTIPATION: 0
WHEEZING: 0
STRIDOR: 0
SHORTNESS OF BREATH: 1
ABDOMINAL PAIN: 0
VOMITING: 0
NAUSEA: 0
COUGH: 1
RHINORRHEA: 0
DIARRHEA: 0
EYE DISCHARGE: 0

## 2023-05-19 ASSESSMENT — PAIN DESCRIPTION - LOCATION
LOCATION: CHEST

## 2023-05-19 ASSESSMENT — PAIN DESCRIPTION - ORIENTATION
ORIENTATION: MID
ORIENTATION: MID

## 2023-05-19 ASSESSMENT — PAIN SCALES - GENERAL
PAINLEVEL_OUTOF10: 6

## 2023-05-19 ASSESSMENT — PAIN DESCRIPTION - DESCRIPTORS: DESCRIPTORS: ACHING

## 2023-05-19 ASSESSMENT — PAIN DESCRIPTION - PAIN TYPE: TYPE: ACUTE PAIN

## 2023-05-19 ASSESSMENT — PAIN DESCRIPTION - FREQUENCY: FREQUENCY: INTERMITTENT

## 2023-05-19 ASSESSMENT — PAIN - FUNCTIONAL ASSESSMENT: PAIN_FUNCTIONAL_ASSESSMENT: 0-10

## 2023-05-19 ASSESSMENT — LIFESTYLE VARIABLES
HOW MANY STANDARD DRINKS CONTAINING ALCOHOL DO YOU HAVE ON A TYPICAL DAY: 1 OR 2
HOW OFTEN DO YOU HAVE A DRINK CONTAINING ALCOHOL: MONTHLY OR LESS

## 2023-05-19 NOTE — ED PROVIDER NOTES
[facility] ED  eMERGENCY dEPARTMENT eNCOUnter      Pt Name: Shayla Uriostegui  MRN: [id number]  [place] 1980  Date of evaluation: 5/19/2023  Provider: Veldon Favre, PA-C    CHIEF COMPLAINT       Chief Complaint   Patient presents with    Chest Pain    Shortness of Breath         HISTORY OF PRESENT ILLNESS   (Location/Symptom, Timing/Onset,Context/Setting, Quality, Duration, Modifying Factors, Severity)  Note limiting factors. Shayla Uriostegui is a 38 y.o. female who presents to the emergency department with complaint of cough, shortness of breath, and pain only present at time of cough which patient has been ongoing since yesterday. She states her cough is dry nonproductive, she does not present with a fever. She has not had any recent travel, patient rates her pain as a 6 out of 10 midsternal region with no radiation. Past medical history significant for migraines, type 2 diabetes, polycystic ovarian syndrome, GERD, chairi malformation. HPI    NursingNotes were reviewed. REVIEW OF SYSTEMS    (2-9 systems for level 4, 10 or more for level 5)     Review of Systems   Constitutional:  Negative for activity change and appetite change. HENT:  Negative for congestion, ear discharge, ear pain, nosebleeds, rhinorrhea and sore throat. Eyes:  Negative for discharge. Respiratory:  Positive for cough and shortness of breath. Negative for wheezing and stridor. Cardiovascular:  Negative for chest pain, palpitations and leg swelling. Gastrointestinal:  Negative for abdominal distention, abdominal pain, constipation, diarrhea, nausea and vomiting. Genitourinary:  Negative for difficulty urinating and dysuria. Musculoskeletal:  Negative for arthralgias. Skin:  Negative for color change. Neurological:  Negative for dizziness, tremors, syncope, weakness, numbness and headaches. Psychiatric/Behavioral:  Negative for agitation and confusion.       Except as noted above the remainder of

## 2023-05-19 NOTE — ED TRIAGE NOTES
Pt states that she has been having SOB and CP with activity and cough x2 days. Pt states it continues to get worse.

## 2023-05-20 LAB
EKG ATRIAL RATE: 77 BPM
EKG P AXIS: 75 DEGREES
EKG P-R INTERVAL: 144 MS
EKG Q-T INTERVAL: 380 MS
EKG QRS DURATION: 80 MS
EKG QTC CALCULATION (BAZETT): 430 MS
EKG R AXIS: 33 DEGREES
EKG T AXIS: 30 DEGREES
EKG VENTRICULAR RATE: 77 BPM

## 2023-05-20 PROCEDURE — 93010 ELECTROCARDIOGRAM REPORT: CPT | Performed by: INTERNAL MEDICINE

## 2023-05-22 ENCOUNTER — NURSE ONLY (OUTPATIENT)
Dept: BARIATRICS/WEIGHT MGMT | Age: 43
End: 2023-05-22

## 2023-05-22 VITALS — WEIGHT: 216.4 LBS | BODY MASS INDEX: 39.82 KG/M2 | HEIGHT: 62 IN

## 2023-05-22 NOTE — PROGRESS NOTES
Nutrition follow up prior to surgery  Visit number:  2 out of 6 for Geovanna en Y gastric bypass. Initial Weight: 214 lbs    Wt Readings from Last 3 Encounters:   05/19/23 214 lb (97.1 kg)   05/11/23 214 lb (97.1 kg)   04/24/23 214 lb (97.1 kg)     gained 2 lbs over 1 month. Previous nutrition goals:     Avoid deep frying food  Pack at least 2 meals for work  [employer] regular pop, candy, sweets  Sample protein shakes  Begin MVI and Calcium  Food journal x 1 week  Nutrition goals: met, except for MVI and Calcium, patient with questions on which ones to get. Patient going to the gym 3 days/week and walking 3 days/week. PES Statement:  Overweight/Obesity related to a complex combination of decreased energy needs, disordered eating patterns, physical inactivity, and increased psychological/life stress as evidenced by BMI greater than 30 and inability to maintain a significant amount of weight loss through conventional weight loss interventions. Intervention:  Reviewed previous goals and set new goals. Stressed importance of preoperative weight loss. Provided continued education regarding diet and lifestyle changes for optimal success post bariatric surgery. Encouragement and support provided.     Education materials provided:   Liver Shrinking Diet Instructions    Plan/Recommendations:   Continue 3 meals, 1 snack /day, can include low carbohydrate frozen meal or 1 protein shake as a meal  Add MVI and Calcium citrate  Continue exercise  Food journal x 1 month    Follow up: 4 weeks     Total time involved in direct patient education: 15 minutes    Bárbara Morrissey RD

## 2023-06-02 ENCOUNTER — OFFICE VISIT (OUTPATIENT)
Dept: FAMILY MEDICINE CLINIC | Age: 43
End: 2023-06-02
Payer: COMMERCIAL

## 2023-06-02 VITALS
OXYGEN SATURATION: 98 % | BODY MASS INDEX: 39.56 KG/M2 | HEART RATE: 72 BPM | SYSTOLIC BLOOD PRESSURE: 110 MMHG | WEIGHT: 215 LBS | DIASTOLIC BLOOD PRESSURE: 82 MMHG | HEIGHT: 62 IN

## 2023-06-02 DIAGNOSIS — G47.09 OTHER INSOMNIA: Primary | ICD-10-CM

## 2023-06-02 DIAGNOSIS — G93.39 OTHER POST INFECTION AND RELATED FATIGUE SYNDROMES: ICD-10-CM

## 2023-06-02 PROCEDURE — G8427 DOCREV CUR MEDS BY ELIG CLIN: HCPCS | Performed by: NURSE PRACTITIONER

## 2023-06-02 PROCEDURE — G8417 CALC BMI ABV UP PARAM F/U: HCPCS | Performed by: NURSE PRACTITIONER

## 2023-06-02 PROCEDURE — 1036F TOBACCO NON-USER: CPT | Performed by: NURSE PRACTITIONER

## 2023-06-02 PROCEDURE — 99213 OFFICE O/P EST LOW 20 MIN: CPT | Performed by: NURSE PRACTITIONER

## 2023-06-02 RX ORDER — TRAZODONE HYDROCHLORIDE 50 MG/1
50 TABLET ORAL NIGHTLY PRN
Qty: 30 TABLET | Refills: 0 | Status: SHIPPED | OUTPATIENT
Start: 2023-06-02

## 2023-06-02 NOTE — PROGRESS NOTES
Pulse 72   Ht 5' 2\" (1.575 m)   Wt 215 lb (97.5 kg)   LMP  (LMP Unknown) Comment: 2012  SpO2 98%   BMI 39.32 kg/m²         Assessment:       Diagnosis Orders   1. 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 Plan:     Assessment & Plan   Aliza Martinez was seen today for fatigue. Diagnoses and all orders for this visit:    Other insomnia    Other post infection and related fatigue syndromes    Other orders  -     traZODone (DESYREL) 50 MG tablet; Take 1 tablet by mouth nightly as needed for Sleep      No orders of the defined types were placed in this encounter. Orders Placed This Encounter   Medications    traZODone (DESYREL) 50 MG tablet     Sig: Take 1 tablet by mouth nightly as needed for Sleep     Dispense:  30 tablet     Refill:  0     Medications Discontinued During This Encounter   Medication Reason    benzonatate (TESSALON PERLES) 100 MG capsule Therapy completed    naproxen (NAPROSYN) 500 MG tablet LIST CLEANUP     No follow-ups on file. Reviewed with the patient/family: current clinical status & medications. Side effects of the medication prescribed today, as well as treatment plan/rationale and result expectations have been discussed with the patient/family who expresses understanding. Patient will be discharged home in stable condition. Follow up with PCP to evaluate treatment results or return if symptoms worsen or fail to improve. Discussed signs and symptoms which require immediate follow-up in ED/call to 911. Understanding verbalized. I have reviewed the patient's medical history in detail and updated the computerized patient record.     Nat Geiger, APRN - CNP

## 2023-06-13 RX ORDER — SODIUM CHLORIDE 0.9 % (FLUSH) 0.9 %
5-40 SYRINGE (ML) INJECTION EVERY 12 HOURS SCHEDULED
Status: CANCELLED | OUTPATIENT
Start: 2023-06-13

## 2023-06-13 RX ORDER — SODIUM CHLORIDE 9 MG/ML
INJECTION, SOLUTION INTRAVENOUS PRN
Status: CANCELLED | OUTPATIENT
Start: 2023-06-13

## 2023-06-13 RX ORDER — SODIUM CHLORIDE 9 MG/ML
INJECTION, SOLUTION INTRAVENOUS CONTINUOUS
Status: CANCELLED | OUTPATIENT
Start: 2023-06-13

## 2023-06-22 ENCOUNTER — OFFICE VISIT (OUTPATIENT)
Dept: PULMONOLOGY | Age: 43
End: 2023-06-22
Payer: COMMERCIAL

## 2023-06-22 VITALS
OXYGEN SATURATION: 100 % | DIASTOLIC BLOOD PRESSURE: 80 MMHG | HEART RATE: 75 BPM | TEMPERATURE: 96.8 F | WEIGHT: 215 LBS | BODY MASS INDEX: 39.32 KG/M2 | SYSTOLIC BLOOD PRESSURE: 128 MMHG

## 2023-06-22 DIAGNOSIS — G47.33 OSA (OBSTRUCTIVE SLEEP APNEA): Primary | ICD-10-CM

## 2023-06-22 DIAGNOSIS — E66.9 OBESITY (BMI 30-39.9): ICD-10-CM

## 2023-06-22 PROCEDURE — G8417 CALC BMI ABV UP PARAM F/U: HCPCS | Performed by: INTERNAL MEDICINE

## 2023-06-22 PROCEDURE — 99214 OFFICE O/P EST MOD 30 MIN: CPT | Performed by: INTERNAL MEDICINE

## 2023-06-22 PROCEDURE — 1036F TOBACCO NON-USER: CPT | Performed by: INTERNAL MEDICINE

## 2023-06-22 PROCEDURE — G8427 DOCREV CUR MEDS BY ELIG CLIN: HCPCS | Performed by: INTERNAL MEDICINE

## 2023-06-22 ASSESSMENT — ENCOUNTER SYMPTOMS
WHEEZING: 0
ABDOMINAL PAIN: 0
SORE THROAT: 0
TROUBLE SWALLOWING: 0
DIARRHEA: 0
VOICE CHANGE: 0
EYE DISCHARGE: 0
EYE ITCHING: 0
VOMITING: 0
NAUSEA: 0
RHINORRHEA: 0
SINUS PRESSURE: 0
CHEST TIGHTNESS: 0
SHORTNESS OF BREATH: 0
COUGH: 0

## 2023-06-22 NOTE — PROGRESS NOTES
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    Hobbies walking, gym     Social Determinants of Health     Financial Resource Strain: Low Risk     Difficulty of Paying Living Expenses: Not hard at all   Food Insecurity: No Food Insecurity    Worried About [address] in the Last Year: Never true    [address] in the Last Year: Never true   Transportation Needs: No Transportation Needs    Lack of Transportation (Medical): No    Lack of Transportation (Non-Medical): No   Physical Activity: Insufficiently Active    Days of Exercise per Week: 4 days    Minutes of Exercise per Session: 30 min   Stress: Not on file   Social Connections: Not on file   Intimate Partner Violence: Not At Risk    Fear of Current or Ex-Partner: No    Emotionally Abused: No    Physically Abused: No    Sexually Abused: No   Housing Stability: Unknown    Unable to Pay for Housing in the Last Year: Not on file    Number of Places Lived in the Last Year: Not on file    Unstable Housing in the Last Year: No         Review of Systems   Constitutional:  Negative for chills, diaphoresis, fatigue and fever. HENT:  Negative for congestion, mouth sores, nosebleeds, postnasal drip, rhinorrhea, sinus pressure, sneezing, sore throat, trouble swallowing and voice change. Eyes:  Negative for discharge, itching and visual disturbance. Respiratory:  Negative for cough, chest tightness, shortness of breath and wheezing. Cardiovascular:  Negative for chest pain, palpitations and leg swelling. Gastrointestinal:  Negative for abdominal pain, diarrhea, nausea and vomiting. Genitourinary:  Negative for difficulty urinating and hematuria. Musculoskeletal:  Negative for arthralgias, joint swelling and myalgias. Skin:  Negative for rash. Allergic/Immunologic: Negative for environmental allergies and food allergies.    Neurological:  Negative for dizziness, tremors,

## 2023-06-26 PROBLEM — G47.33 OSA (OBSTRUCTIVE SLEEP APNEA): Status: ACTIVE | Noted: 2023-06-26

## 2023-06-27 ENCOUNTER — OFFICE VISIT (OUTPATIENT)
Dept: BARIATRICS/WEIGHT MGMT | Age: 43
End: 2023-06-27

## 2023-06-27 ENCOUNTER — NURSE ONLY (OUTPATIENT)
Dept: BARIATRICS/WEIGHT MGMT | Age: 43
End: 2023-06-27

## 2023-06-27 VITALS — BODY MASS INDEX: 39.27 KG/M2 | WEIGHT: 213.4 LBS | HEIGHT: 62 IN

## 2023-06-27 DIAGNOSIS — Z71.89 ENCOUNTER FOR PSYCHOLOGICAL ASSESSMENT PRIOR TO BARIATRIC SURGERY: Primary | ICD-10-CM

## 2023-06-27 DIAGNOSIS — R73.09 ABNORMAL GLUCOSE: ICD-10-CM

## 2023-06-27 DIAGNOSIS — E66.01 CLASS 2 SEVERE OBESITY DUE TO EXCESS CALORIES WITH SERIOUS COMORBIDITY AND BODY MASS INDEX (BMI) OF 39.0 TO 39.9 IN ADULT (HCC): ICD-10-CM

## 2023-06-27 DIAGNOSIS — G47.33 OSA (OBSTRUCTIVE SLEEP APNEA): ICD-10-CM

## 2023-06-27 DIAGNOSIS — K21.9 GASTROESOPHAGEAL REFLUX DISEASE WITHOUT ESOPHAGITIS: ICD-10-CM

## 2023-06-27 DIAGNOSIS — F54 PSYCHOLOGICAL FACTORS AFFECTING MORBID OBESITY (HCC): ICD-10-CM

## 2023-06-27 DIAGNOSIS — E66.01 PSYCHOLOGICAL FACTORS AFFECTING MORBID OBESITY (HCC): ICD-10-CM

## 2023-06-27 DIAGNOSIS — E28.2 PCO (POLYCYSTIC OVARIES): ICD-10-CM

## 2023-06-27 ASSESSMENT — ANXIETY QUESTIONNAIRES
GAD7 TOTAL SCORE: 0
6. BECOMING EASILY ANNOYED OR IRRITABLE: 0
1. FEELING NERVOUS, ANXIOUS, OR ON EDGE: 0
2. NOT BEING ABLE TO STOP OR CONTROL WORRYING: 0
3. WORRYING TOO MUCH ABOUT DIFFERENT THINGS: 0
IF YOU CHECKED OFF ANY PROBLEMS ON THIS QUESTIONNAIRE, HOW DIFFICULT HAVE THESE PROBLEMS MADE IT FOR YOU TO DO YOUR WORK, TAKE CARE OF THINGS AT HOME, OR GET ALONG WITH OTHER PEOPLE: NOT DIFFICULT AT ALL
4. TROUBLE RELAXING: 0
5. BEING SO RESTLESS THAT IT IS HARD TO SIT STILL: 0
7. FEELING AFRAID AS IF SOMETHING AWFUL MIGHT HAPPEN: 0

## 2023-06-27 ASSESSMENT — PATIENT HEALTH QUESTIONNAIRE - PHQ9
4. FEELING TIRED OR HAVING LITTLE ENERGY: 3
SUM OF ALL RESPONSES TO PHQ9 QUESTIONS 1 & 2: 3
3. TROUBLE FALLING OR STAYING ASLEEP: 3
9. THOUGHTS THAT YOU WOULD BE BETTER OFF DEAD, OR OF HURTING YOURSELF: 0
5. POOR APPETITE OR OVEREATING: 0
6. FEELING BAD ABOUT YOURSELF - OR THAT YOU ARE A FAILURE OR HAVE LET YOURSELF OR YOUR FAMILY DOWN: 0
SUM OF ALL RESPONSES TO PHQ QUESTIONS 1-9: 9
8. MOVING OR SPEAKING SO SLOWLY THAT OTHER PEOPLE COULD HAVE NOTICED. OR THE OPPOSITE, BEING SO FIGETY OR RESTLESS THAT YOU HAVE BEEN MOVING AROUND A LOT MORE THAN USUAL: 0
SUM OF ALL RESPONSES TO PHQ QUESTIONS 1-9: 9
SUM OF ALL RESPONSES TO PHQ QUESTIONS 1-9: 9
1. LITTLE INTEREST OR PLEASURE IN DOING THINGS: 3
SUM OF ALL RESPONSES TO PHQ QUESTIONS 1-9: 9
2. FEELING DOWN, DEPRESSED OR HOPELESS: 0
10. IF YOU CHECKED OFF ANY PROBLEMS, HOW DIFFICULT HAVE THESE PROBLEMS MADE IT FOR YOU TO DO YOUR WORK, TAKE CARE OF THINGS AT HOME, OR GET ALONG WITH OTHER PEOPLE: 0
7. TROUBLE CONCENTRATING ON THINGS, SUCH AS READING THE NEWSPAPER OR WATCHING TELEVISION: 0

## 2023-09-01 ENCOUNTER — HOSPITAL ENCOUNTER (EMERGENCY)
Age: 43
Discharge: HOME | End: 2023-09-01

## 2023-09-01 VITALS
RESPIRATION RATE: 16 BRPM | HEART RATE: 66 BPM | SYSTOLIC BLOOD PRESSURE: 127 MMHG | DIASTOLIC BLOOD PRESSURE: 82 MMHG | HEIGHT: 62 IN | TEMPERATURE: 97.8 F | BODY MASS INDEX: 39.56 KG/M2 | WEIGHT: 215 LBS | OXYGEN SATURATION: 98 %

## 2023-09-01 DIAGNOSIS — R19.7 DIARRHEA, UNSPECIFIED TYPE: Primary | ICD-10-CM

## 2023-09-01 PROCEDURE — 99282 EMERGENCY DEPT VISIT SF MDM: CPT

## 2023-09-01 ASSESSMENT — ENCOUNTER SYMPTOMS
BACK PAIN: 0
NAUSEA: 0
DIARRHEA: 1
SORE THROAT: 0
SHORTNESS OF BREATH: 0
TROUBLE SWALLOWING: 0
VOMITING: 0
ABDOMINAL PAIN: 0
COUGH: 0

## 2023-09-01 ASSESSMENT — PAIN DESCRIPTION - FREQUENCY: FREQUENCY: CONTINUOUS

## 2023-09-01 ASSESSMENT — PAIN SCALES - GENERAL: PAINLEVEL_OUTOF10: 5

## 2023-09-01 ASSESSMENT — PAIN DESCRIPTION - DESCRIPTORS: DESCRIPTORS: CRAMPING

## 2023-09-01 ASSESSMENT — PAIN DESCRIPTION - PAIN TYPE: TYPE: ACUTE PAIN

## 2023-09-01 ASSESSMENT — PAIN - FUNCTIONAL ASSESSMENT: PAIN_FUNCTIONAL_ASSESSMENT: 0-10

## 2023-09-01 ASSESSMENT — PAIN DESCRIPTION - ONSET: ONSET: ON-GOING

## 2023-09-01 ASSESSMENT — PAIN DESCRIPTION - LOCATION: LOCATION: ABDOMEN

## 2023-09-01 NOTE — ED PROVIDER NOTES
[facility] ED  eMERGENCY dEPARTMENT eNCOUnter      Pt Name: Max Bird  MRN: [id number]  [address] [facility] [place] 1980  Date of evaluation: 9/1/2023  Provider: CORNELIO Hannah CNP      HISTORY OF PRESENT ILLNESS    Max Son is a 38 y.o. female who presents to the Emergency Department with diarrhea x 4 days. Patient is eating and drinking without difficulty. She denies any vomiting or nausea. Patient states employer wanted her to get a note to return to work. REVIEW OF SYSTEMS       Review of Systems   Constitutional:  Negative for activity change, appetite change and fever. HENT:  Negative for congestion, drooling, ear pain, sore throat and trouble swallowing. Respiratory:  Negative for cough and shortness of breath. Cardiovascular:  Negative for chest pain. Gastrointestinal:  Positive for diarrhea. Negative for abdominal pain, nausea and vomiting. Genitourinary:  Negative for dysuria. Musculoskeletal:  Negative for arthralgias, back pain and neck pain. Skin:  Negative for rash. Neurological:  Negative for dizziness, syncope, light-headedness and headaches. All other systems reviewed and are negative.       PAST MEDICAL HISTORY     Past Medical History:   Diagnosis Date    Chiari I malformation ([address])     Eczema     Migraines 2010    PCO (polycystic ovaries)     S/P complete hysterectomy 2012    Dr Ingris Bates, atypical endocervical cells    Strain of musc/tend the rotator cuff of right shoulder, subs 12/12/2019    Type II diabetes mellitus, uncontrolled     Dr Anshu Schilling       Past Surgical History:   Procedure Laterality Date    HYSTERECTOMY, TOTAL ABDOMINAL (CERVIX REMOVED)  2012    Dr Ingris Bates, cervical atypia    UPPER GASTROINTESTINAL ENDOSCOPY N/A 6/14/2023    EGD BIOPSY performed by Sreedhar Britt MD at [address] [place] [address]       Previous Medications    GALCANEZUMAB-GNLM (EMGALITY) 120 MG/ML SOAJ    Inject 120 mg

## 2023-09-01 NOTE — ED TRIAGE NOTES
Pt c/o diarrhea x4 days with abd cramping. Pt states she needs a note to go back to work. Pt states 4 episodes a day, pt took imodium last night. Pt is A&Ox4, skin intact, afebrile, breaths are equal and unlabored.

## 2023-09-23 NOTE — ED NOTES
Brady DIAZ at bedside with explanation of results and possible discharge. Discharge education reviewed. Patient instructed to follow up with PCP and come back to the ED with any new or worsening symptoms. No questions or concerns at this time.        Pedro Noriega RN  05/19/23 2914
Patient up to ambulate on room air. Pt saturations were 97% during ambulation with pulse 99. Rich PA aware.       Preeti Dennis RN  05/19/23 3370
Portable xray at bedside.         Dani Khoury RN  05/19/23 9988
Updated the patient on lab work and plan of care. Patient was given a second cup of ice water.       Howard Ureña RN  05/19/23 9784
Oriented - self; Oriented - place; Oriented - time

## 2023-10-06 ENCOUNTER — OFFICE VISIT (OUTPATIENT)
Dept: FAMILY MEDICINE CLINIC | Age: 43
End: 2023-10-06

## 2023-10-06 VITALS
TEMPERATURE: 97.4 F | DIASTOLIC BLOOD PRESSURE: 82 MMHG | WEIGHT: 217 LBS | RESPIRATION RATE: 16 BRPM | HEART RATE: 71 BPM | BODY MASS INDEX: 39.93 KG/M2 | HEIGHT: 62 IN | OXYGEN SATURATION: 99 % | SYSTOLIC BLOOD PRESSURE: 126 MMHG

## 2023-10-06 DIAGNOSIS — E66.01 CLASS 3 SEVERE OBESITY DUE TO EXCESS CALORIES WITHOUT SERIOUS COMORBIDITY WITH BODY MASS INDEX (BMI) OF 40.0 TO 44.9 IN ADULT (HCC): ICD-10-CM

## 2023-10-06 DIAGNOSIS — Z13.220 SCREENING, LIPID: ICD-10-CM

## 2023-10-06 DIAGNOSIS — E28.2 PCO (POLYCYSTIC OVARIES): Primary | ICD-10-CM

## 2023-10-06 DIAGNOSIS — R73.03 PRE-DIABETES: ICD-10-CM

## 2023-10-06 DIAGNOSIS — J22 LOWER RESPIRATORY INFECTION: ICD-10-CM

## 2023-10-06 RX ORDER — BENZONATATE 100 MG/1
100 CAPSULE ORAL EVERY 8 HOURS
Qty: 30 CAPSULE | Refills: 0 | Status: SHIPPED | OUTPATIENT
Start: 2023-10-06 | End: 2023-10-13

## 2023-10-06 RX ORDER — AZITHROMYCIN 250 MG/1
TABLET, FILM COATED ORAL
Qty: 6 TABLET | Refills: 0 | Status: SHIPPED | OUTPATIENT
Start: 2023-10-06 | End: 2023-10-11

## 2023-10-06 RX ORDER — GUAIFENESIN 600 MG/1
600 TABLET, EXTENDED RELEASE ORAL 2 TIMES DAILY
Qty: 30 TABLET | Refills: 0 | Status: SHIPPED | OUTPATIENT
Start: 2023-10-06 | End: 2023-10-21

## 2023-10-06 ASSESSMENT — ENCOUNTER SYMPTOMS
COUGH: 0
DIARRHEA: 0
SHORTNESS OF BREATH: 0
VOMITING: 0
BACK PAIN: 0
SORE THROAT: 0
ABDOMINAL PAIN: 0
NAUSEA: 0
SINUS PAIN: 0
SINUS PRESSURE: 0
CHEST TIGHTNESS: 0

## 2023-10-06 ASSESSMENT — PATIENT HEALTH QUESTIONNAIRE - PHQ9
SUM OF ALL RESPONSES TO PHQ QUESTIONS 1-9: 0
SUM OF ALL RESPONSES TO PHQ QUESTIONS 1-9: 0
1. LITTLE INTEREST OR PLEASURE IN DOING THINGS: 0
SUM OF ALL RESPONSES TO PHQ QUESTIONS 1-9: 0
SUM OF ALL RESPONSES TO PHQ QUESTIONS 1-9: 0
SUM OF ALL RESPONSES TO PHQ9 QUESTIONS 1 & 2: 0
2. FEELING DOWN, DEPRESSED OR HOPELESS: 0

## 2023-10-06 ASSESSMENT — VISUAL ACUITY: OU: 1

## 2023-10-06 NOTE — PROGRESS NOTES
Well Adult Note  Name: Diana Cm Date: 10/6/2023   MRN: [id number] Sex: Female   Age: 38 y.o. Ethnicity: Non- / Non    : 1980 Race: Black /       Jackson Overton is here for well adult exam.  History:  Chronic Right knee pain  - attributes this to her weight. Localized medical compartment. Aching. No radiating pain. Patient denies catching or locking sensation. Tylenol prn. Migraine  - emglaity. Working. Limiting migraines at this time. [facility]ology [facility]. Obesity  - patient has tried diet and exercise in the past and has not noticed improvement. Patient frustrated that she is unable to keep weight off. Has co-morbidities of PCO and joint pain. - trying to exercises but right knee is pain  - scared of bariatric surgery due to cousin having complications. Patient will reach out to program.   - PCOS- not on metformin or spironolactone. No uterus. Patient not having pelvic pain. Having trouble losing weight. Possible to lose weight with metformin. Tried multiple medications include topiramate and adipex with no success. Hotflashes throughout the day. - patient states this is happening 6 months  - carries diagnosis of PCOS. - patient is not on metformin or spironolactone. - hysterectomy. URI  - started as a head cold 3-4 days ago. Improved for a little bit and then settled in the chest. Coughing green mucus. Cough keeping up at night. No sob or chest pain. No fevers or chills. Review of Systems   Constitutional:  Negative for activity change, appetite change, chills and fever. HENT:  Negative for congestion, drooling, sinus pressure, sinus pain and sore throat. Eyes:  Negative for visual disturbance. Respiratory:  Negative for cough, chest tightness and shortness of breath. Cardiovascular:  Negative for chest pain. Gastrointestinal:  Negative for abdominal pain, diarrhea, nausea and vomiting.    Endocrine: Negative for cold

## 2023-10-24 ENCOUNTER — OFFICE VISIT (OUTPATIENT)
Dept: BARIATRICS/WEIGHT MGMT | Age: 43
End: 2023-10-24
Payer: COMMERCIAL

## 2023-10-24 VITALS — WEIGHT: 214.6 LBS | HEIGHT: 62 IN | BODY MASS INDEX: 39.49 KG/M2

## 2023-10-24 DIAGNOSIS — E66.01 CLASS 2 SEVERE OBESITY DUE TO EXCESS CALORIES WITH SERIOUS COMORBIDITY AND BODY MASS INDEX (BMI) OF 39.0 TO 39.9 IN ADULT (HCC): Primary | Chronic | ICD-10-CM

## 2023-10-24 DIAGNOSIS — Z71.3 DIETARY COUNSELING AND SURVEILLANCE: ICD-10-CM

## 2023-10-24 PROCEDURE — 97803 MED NUTRITION INDIV SUBSEQ: CPT | Performed by: DIETITIAN, REGISTERED

## 2023-10-24 NOTE — PROGRESS NOTES
Nutrition follow up prior to surgery  Visit number:  4 out of 6 for Geovanna en Y gastric bypass. Initial Weight: 214 lbs    Wt Readings from Last 3 Encounters:   10/06/23 98.4 kg (217 lb)   09/01/23 97.5 kg (215 lb)   06/27/23 96.8 kg (213 lb 6.4 oz)     stable / unchanged from intial    Previous nutrition goals:   Limit to only 1 frozen meal/day, plan dinner using bariatric plate method  Continue regular exercise  Recommend attend support group    Nutrition goals: partially met, states off track over past month d/t insurance change. Also states cousin had WLS at another facility, having complications and patient started having second thoughts. Patient now working day shift, only eating 1 meal in the evening. Continues to walk daily and going to gym 2 times/week. Drinking mostly approved beverages and taking MVI, couldn't find calcium. PES Statement:  Overweight/Obesity related to a complex combination of decreased energy needs, disordered eating patterns, physical inactivity, and increased psychological/life stress as evidenced by BMI greater than 30 and inability to maintain a significant amount of weight loss through conventional weight loss interventions. Intervention:  Reviewed previous goals and set new goals. Stressed importance of preoperative weight loss or surgery may be postponed or cancelled. Provided continued education regarding diet and lifestyle changes for optimal success post bariatric surgery. Encouragement and support provided.     Education materials provided:   Patient Guide to Bariatric Surgery    Plan/Recommendations:   Include at least 3 meals/day  Pack lunch daily for work  Sample CL protein drinks  Purchase bariatric MVI and Calcium citrate  Keep food journal on [employer] ken    Follow up: 4 weeks     Total time involved in direct patient education: 30 minutes    Klaudia Beebe RD

## 2023-10-26 ENCOUNTER — TELEPHONE (OUTPATIENT)
Dept: FAMILY MEDICINE CLINIC | Age: 43
End: 2023-10-26

## 2023-10-26 NOTE — TELEPHONE ENCOUNTER
[place] [employer] has been denied. It will only be covered if patient is diabetic. Please advise.

## 2023-10-30 ENCOUNTER — TELEPHONE (OUTPATIENT)
Dept: FAMILY MEDICINE CLINIC | Age: 43
End: 2023-10-30

## 2023-10-30 NOTE — TELEPHONE ENCOUNTER
Patient would like to try adipex. But in capsule form . Please advise.  Pt is aware Ozempic is denied

## 2023-11-09 ENCOUNTER — TELEPHONE (OUTPATIENT)
Dept: FAMILY MEDICINE CLINIC | Age: 43
End: 2023-11-09

## 2023-11-21 ENCOUNTER — OFFICE VISIT (OUTPATIENT)
Dept: BARIATRICS/WEIGHT MGMT | Age: 43
End: 2023-11-21
Payer: COMMERCIAL

## 2023-11-21 VITALS — WEIGHT: 215.8 LBS | BODY MASS INDEX: 39.71 KG/M2 | HEIGHT: 62 IN

## 2023-11-21 DIAGNOSIS — E66.01 CLASS 2 SEVERE OBESITY DUE TO EXCESS CALORIES WITH SERIOUS COMORBIDITY AND BODY MASS INDEX (BMI) OF 39.0 TO 39.9 IN ADULT (HCC): Primary | ICD-10-CM

## 2023-11-21 DIAGNOSIS — Z71.3 DIETARY COUNSELING AND SURVEILLANCE: ICD-10-CM

## 2023-11-21 PROCEDURE — 97803 MED NUTRITION INDIV SUBSEQ: CPT | Performed by: DIETITIAN, REGISTERED

## 2023-11-21 PROCEDURE — G8428 CUR MEDS NOT DOCUMENT: HCPCS | Performed by: DIETITIAN, REGISTERED

## 2023-11-21 PROCEDURE — G8417 CALC BMI ABV UP PARAM F/U: HCPCS | Performed by: DIETITIAN, REGISTERED

## 2023-11-21 NOTE — PROGRESS NOTES
Nutrition follow up prior to surgery  Visit number:  5 out of 6 for Geovanna en Y gastric bypass. Initial Weight: 214 lbs    Wt Readings from Last 3 Encounters:   10/24/23 97.3 kg (214 lb 9.6 oz)   10/06/23 98.4 kg (217 lb)   09/01/23 97.5 kg (215 lb)     gained 1 lbs over 1 month, up ~ 2 lbs from initial .    Previous nutrition goals: Include at least 3 meals/day  Pack lunch daily for work  Sample CL protein drinks  Purchase bariatric MVI and Calcium citrate  Keep food journal on [employer] ken    Nutrition goals: partially met, didn't keep food journal or bring education manual. States eating 3 meals/day, 2 as frozen meals. Patient found CL protein childs but didn't sample. Continues to take required vitamins. Patient going to the gym 2 days/week. Patient will be completing a CPAP titration study 12/13. PES Statement:  Overweight/Obesity related to a complex combination of decreased energy needs, disordered eating patterns, physical inactivity, and increased psychological/life stress as evidenced by BMI greater than 30 and inability to maintain a significant amount of weight loss through conventional weight loss interventions. Intervention:  Reviewed previous goals and set new goals. Stressed importance of preoperative weight loss or surgery may be postponed or cancelled. Provided continued education regarding diet and lifestyle changes for optimal success post bariatric surgery. Encouragement and support provided.     Education materials provided: none    Plan/Recommendations:   Keep food journal on [employer] ken: 1200kcal/day  Limit frozen meals to only 1/day  Add home exercise videos 2-3 days/week    Follow up: 4 weeks     Total time involved in direct patient education: 15 minutes    Mary Lou Mathis RD

## 2023-11-30 ENCOUNTER — HOSPITAL ENCOUNTER (OUTPATIENT)
Dept: SLEEP CENTER | Age: 43
Discharge: HOME | End: 2023-12-02
Payer: COMMERCIAL

## 2023-11-30 PROCEDURE — 95811 POLYSOM 6/>YRS CPAP 4/> PARM: CPT

## 2023-12-19 DIAGNOSIS — Z01.818 PRE-OP TESTING: ICD-10-CM

## 2023-12-19 LAB — HCG UR QL: NEGATIVE

## 2024-01-11 ENCOUNTER — OFFICE VISIT (OUTPATIENT)
Dept: PULMONOLOGY | Age: 44
End: 2024-01-11
Payer: COMMERCIAL

## 2024-01-11 VITALS
DIASTOLIC BLOOD PRESSURE: 82 MMHG | WEIGHT: 213 LBS | OXYGEN SATURATION: 99 % | BODY MASS INDEX: 38.96 KG/M2 | SYSTOLIC BLOOD PRESSURE: 112 MMHG | HEART RATE: 75 BPM

## 2024-01-11 DIAGNOSIS — G47.33 OSA (OBSTRUCTIVE SLEEP APNEA): Primary | ICD-10-CM

## 2024-01-11 DIAGNOSIS — E66.9 OBESITY (BMI 30-39.9): ICD-10-CM

## 2024-01-11 PROCEDURE — G8417 CALC BMI ABV UP PARAM F/U: HCPCS | Performed by: INTERNAL MEDICINE

## 2024-01-11 PROCEDURE — 1036F TOBACCO NON-USER: CPT | Performed by: INTERNAL MEDICINE

## 2024-01-11 PROCEDURE — 99214 OFFICE O/P EST MOD 30 MIN: CPT | Performed by: INTERNAL MEDICINE

## 2024-01-11 PROCEDURE — G8484 FLU IMMUNIZE NO ADMIN: HCPCS | Performed by: INTERNAL MEDICINE

## 2024-01-11 PROCEDURE — G8427 DOCREV CUR MEDS BY ELIG CLIN: HCPCS | Performed by: INTERNAL MEDICINE

## 2024-01-11 ASSESSMENT — ENCOUNTER SYMPTOMS
SORE THROAT: 0
CHEST TIGHTNESS: 0
COUGH: 0
RHINORRHEA: 0
EYE DISCHARGE: 0
DIARRHEA: 0
WHEEZING: 0
SINUS PRESSURE: 0
VOMITING: 0
VOICE CHANGE: 0
EYE ITCHING: 0
TROUBLE SWALLOWING: 0
ABDOMINAL PAIN: 0
NAUSEA: 0
SHORTNESS OF BREATH: 0

## 2024-01-11 NOTE — PROGRESS NOTES
Subjective:             Idalia Sarmiento is a 43 y.o. female who complains today of:     Chief Complaint   Patient presents with    Follow-up     F/u for SS results        HPI  She is going for bariatric surgery and she  is still in process of evaluation .   She had sleep study done show AHI 5.3 , AHI in REM 24, moderate snoring .   She had CPAP with 6 cm   She is complaining of snoring and sometimes daytime sleepiness and tiredness.  No C/o witness apnea.  She does not wakes up with gasping for air.   C/o wakes up frequently during sleep .   complaint of morning headache. H/o migraine  She does sometime have restful sleep.  She does fall asleep while watching TV.    Allergies:  Patient has no known allergies.  Past Medical History:   Diagnosis Date    Chiari I malformation (HCC)     Eczema     Migraines 2010    PCO (polycystic ovaries)     S/P complete hysterectomy 2012    Dr Dakotah Cervantes, atypical endocervical cells    Strain of musc/tend the rotator cuff of right shoulder, subs 12/12/2019    Type II diabetes mellitus, uncontrolled     Dr Benz     Past Surgical History:   Procedure Laterality Date    HYSTERECTOMY, TOTAL ABDOMINAL (CERVIX REMOVED)  2012    Dr Dakotah Cervantes, cervical atypia    UPPER GASTROINTESTINAL ENDOSCOPY N/A 6/14/2023    EGD BIOPSY performed by Jl Chan MD at [facility]     Family History   Problem Relation Age of Onset    Cancer Mother         ovarian? dec age 30    Cancer Father         dec age 60    Colon Cancer Neg Hx      Social History     Socioeconomic History    Marital status: Single     Spouse name: Not on file    Number of children: 4    Years of education: Not on file    Highest education level: Not on file   Occupational History    Occupation: home health aide   Tobacco Use    Smoking status: Never     Passive exposure: Never    Smokeless tobacco: Never   Vaping Use    Vaping Use: Never used   Substance and Sexual Activity    Alcohol use: No    Drug use: No    Sexual

## 2024-01-19 ENCOUNTER — OFFICE VISIT (OUTPATIENT)
Dept: BARIATRICS/WEIGHT MGMT | Age: 44
End: 2024-01-19

## 2024-01-19 VITALS — HEIGHT: 62 IN | BODY MASS INDEX: 38.96 KG/M2

## 2024-01-19 DIAGNOSIS — Z71.3 DIETARY COUNSELING AND SURVEILLANCE: ICD-10-CM

## 2024-01-19 DIAGNOSIS — E66.01 CLASS 2 SEVERE OBESITY DUE TO EXCESS CALORIES WITH SERIOUS COMORBIDITY AND BODY MASS INDEX (BMI) OF 39.0 TO 39.9 IN ADULT (HCC): Primary | ICD-10-CM

## 2024-01-19 NOTE — PROGRESS NOTES
Nutrition follow up prior to surgery  Visit number:  7 out of 6 for Geovanna en Y gastric bypass.    Initial Weight: 214 lbs    Wt Readings from Last 3 Encounters:   01/11/24 96.6 kg (213 lb)   12/19/23 98.9 kg (218 lb)   11/21/23 97.9 kg (215 lb 12.8 oz)     lost 4 lbs over 1 month, stable from initial.    Previous nutrition goals:   Increase fluids to 64 oz/day  Resume exercise at gym  Follow up with pulmonology 1/11/24 for clearance  Will request Topamax to aide in preoperative weight loss    Nutrition goals: partially met, patient states keeping food journal but didn't bring, per verbal recall consuming 3 meals/day and 1 snack, incorporating protein at most meals. Drinking only approved beverages but still working on meeting goal of 64 oz/day. Taking bariatric MVI and calcium citrate. Patient has not purchased clear liquid protein childs yet.  Topamax ordered, patient has not picked up yet. Patient has also increased exercise, going to the gym 3 times/week and home exercises daily.    Patient has completed required nutrition appointments and has met nutrition goals.  Reviewed importance of preoperative weight loss (10% of body weight) and reminded patient that surgery may be postponed or cancelled if weight loss is inadequate.  Patient is cleared from nutrition for bariatric surgery at this time.  Will follow up preoperatively as needed.      PES Statement:  Overweight/Obesity related to a complex combination of decreased energy needs, disordered eating patterns, physical inactivity, and increased psychological/life stress as evidenced by BMI greater than 30 and inability to maintain a significant amount of weight loss through conventional weight loss interventions.    Intervention:  Reviewed previous goals and set new goals.  Stressed importance of preoperative weight loss or surgery may be postponed or cancelled.  Provided continued education regarding diet and lifestyle changes for optimal success post bariatric

## 2024-01-24 ENCOUNTER — OFFICE VISIT (OUTPATIENT)
Dept: FAMILY MEDICINE CLINIC | Age: 44
End: 2024-01-24
Payer: COMMERCIAL

## 2024-01-24 ENCOUNTER — HOSPITAL ENCOUNTER (OUTPATIENT)
Dept: CT IMAGING | Age: 44
Discharge: HOME | End: 2024-01-26
Payer: COMMERCIAL

## 2024-01-24 VITALS
HEIGHT: 62 IN | TEMPERATURE: 97 F | BODY MASS INDEX: 39.56 KG/M2 | SYSTOLIC BLOOD PRESSURE: 108 MMHG | DIASTOLIC BLOOD PRESSURE: 68 MMHG | RESPIRATION RATE: 16 BRPM | WEIGHT: 215 LBS

## 2024-01-24 DIAGNOSIS — R10.9 FLANK PAIN: ICD-10-CM

## 2024-01-24 DIAGNOSIS — R31.9 HEMATURIA, UNSPECIFIED TYPE: ICD-10-CM

## 2024-01-24 DIAGNOSIS — K76.89 LIVER CYST: Primary | ICD-10-CM

## 2024-01-24 DIAGNOSIS — R10.31 RLQ ABDOMINAL PAIN: Primary | ICD-10-CM

## 2024-01-24 DIAGNOSIS — R10.31 RLQ ABDOMINAL PAIN: ICD-10-CM

## 2024-01-24 DIAGNOSIS — R35.0 FREQUENCY OF URINATION: ICD-10-CM

## 2024-01-24 LAB
BILIRUBIN, POC: NORMAL
BLOOD URINE, POC: NORMAL
CLARITY, POC: NORMAL
COLOR, POC: YELLOW
CONTROL: NORMAL
GLUCOSE URINE, POC: NORMAL
KETONES, POC: NORMAL
LEUKOCYTE EST, POC: NORMAL
NITRITE, POC: NORMAL
PH, POC: 5.5
PREGNANCY TEST URINE, POC: NEGATIVE
PROTEIN, POC: NORMAL
SPECIFIC GRAVITY, POC: 1.03
UROBILINOGEN, POC: NORMAL

## 2024-01-24 PROCEDURE — 1036F TOBACCO NON-USER: CPT | Performed by: PHYSICIAN ASSISTANT

## 2024-01-24 PROCEDURE — G8427 DOCREV CUR MEDS BY ELIG CLIN: HCPCS | Performed by: PHYSICIAN ASSISTANT

## 2024-01-24 PROCEDURE — G8484 FLU IMMUNIZE NO ADMIN: HCPCS | Performed by: PHYSICIAN ASSISTANT

## 2024-01-24 PROCEDURE — 99214 OFFICE O/P EST MOD 30 MIN: CPT | Performed by: PHYSICIAN ASSISTANT

## 2024-01-24 PROCEDURE — 81025 URINE PREGNANCY TEST: CPT | Performed by: PHYSICIAN ASSISTANT

## 2024-01-24 PROCEDURE — 81002 URINALYSIS NONAUTO W/O SCOPE: CPT | Performed by: PHYSICIAN ASSISTANT

## 2024-01-24 PROCEDURE — 74176 CT ABD & PELVIS W/O CONTRAST: CPT

## 2024-01-24 PROCEDURE — G8417 CALC BMI ABV UP PARAM F/U: HCPCS | Performed by: PHYSICIAN ASSISTANT

## 2024-01-24 RX ORDER — RIZATRIPTAN BENZOATE 10 MG/1
10 TABLET, ORALLY DISINTEGRATING ORAL PRN
COMMUNITY
Start: 2024-01-04

## 2024-01-24 RX ORDER — NITROFURANTOIN 25; 75 MG/1; MG/1
100 CAPSULE ORAL 2 TIMES DAILY
Qty: 10 CAPSULE | Refills: 0 | Status: SHIPPED | OUTPATIENT
Start: 2024-01-24 | End: 2024-01-29

## 2024-01-24 ASSESSMENT — ENCOUNTER SYMPTOMS
FLATUS: 0
HEMATOCHEZIA: 0
VOMITING: 0
ABDOMINAL PAIN: 1
DIARRHEA: 0
CONSTIPATION: 0
BELCHING: 0
NAUSEA: 0

## 2024-01-24 ASSESSMENT — PATIENT HEALTH QUESTIONNAIRE - PHQ9
SUM OF ALL RESPONSES TO PHQ QUESTIONS 1-9: 0
1. LITTLE INTEREST OR PLEASURE IN DOING THINGS: 0
SUM OF ALL RESPONSES TO PHQ9 QUESTIONS 1 & 2: 0
SUM OF ALL RESPONSES TO PHQ QUESTIONS 1-9: 0
SUM OF ALL RESPONSES TO PHQ QUESTIONS 1-9: 0
2. FEELING DOWN, DEPRESSED OR HOPELESS: 0
SUM OF ALL RESPONSES TO PHQ QUESTIONS 1-9: 0

## 2024-01-24 ASSESSMENT — VISUAL ACUITY: OU: 1

## 2024-01-24 ASSESSMENT — CROHNS DISEASE ACTIVITY INDEX (CDAI): CDAI SCORE: 0

## 2024-01-24 NOTE — PROGRESS NOTES
symptoms worsen or fail to improve.        Reviewed with the patient: current clinical status, medications, activities and diet.     Side effects, adverse effects of the medication prescribed today, as well as treatment plan/ rationale and result expectations have been discussed with the patient who expresses understanding and desires to proceed.    Close follow up to evaluate treatment results and for coordination of care.  I have reviewed the patient's medical history in detail and updated the computerized patient record.    EMILY Abrams

## 2024-01-25 LAB — BACTERIA UR CULT: NORMAL

## 2024-01-30 ENCOUNTER — PREP FOR PROCEDURE (OUTPATIENT)
Dept: BARIATRICS/WEIGHT MGMT | Age: 44
End: 2024-01-30

## 2024-01-30 DIAGNOSIS — E66.01 MORBID (SEVERE) OBESITY DUE TO EXCESS CALORIES (HCC): ICD-10-CM

## 2024-01-30 DIAGNOSIS — R73.09 OTHER ABNORMAL GLUCOSE: ICD-10-CM

## 2024-01-30 DIAGNOSIS — E28.2 POLYCYSTIC OVARIAN SYNDROME: ICD-10-CM

## 2024-02-01 ENCOUNTER — HOSPITAL ENCOUNTER (OUTPATIENT)
Dept: ULTRASOUND IMAGING | Age: 44
Discharge: HOME | End: 2024-02-03
Payer: COMMERCIAL

## 2024-02-01 DIAGNOSIS — K76.89 LIVER CYST: ICD-10-CM

## 2024-02-01 PROCEDURE — 76705 ECHO EXAM OF ABDOMEN: CPT

## 2024-02-06 ENCOUNTER — PATIENT MESSAGE (OUTPATIENT)
Dept: FAMILY MEDICINE CLINIC | Age: 44
End: 2024-02-06

## 2024-02-06 ENCOUNTER — OFFICE VISIT (OUTPATIENT)
Dept: OBGYN CLINIC | Age: 44
End: 2024-02-06

## 2024-02-06 VITALS
SYSTOLIC BLOOD PRESSURE: 104 MMHG | HEIGHT: 62 IN | BODY MASS INDEX: 39.56 KG/M2 | HEART RATE: 68 BPM | WEIGHT: 215 LBS | DIASTOLIC BLOOD PRESSURE: 74 MMHG

## 2024-02-06 DIAGNOSIS — N83.201 RIGHT OVARIAN CYST: ICD-10-CM

## 2024-02-06 DIAGNOSIS — R10.2 PELVIC PAIN: ICD-10-CM

## 2024-02-06 DIAGNOSIS — Z87.42 HX OF OVARIAN CYST: Primary | ICD-10-CM

## 2024-02-07 DIAGNOSIS — R16.0 LIVER MASSES: ICD-10-CM

## 2024-02-07 DIAGNOSIS — R10.31 RLQ ABDOMINAL PAIN: ICD-10-CM

## 2024-02-07 DIAGNOSIS — R31.9 HEMATURIA, UNSPECIFIED TYPE: Primary | ICD-10-CM

## 2024-02-07 LAB — CA 125: 6 U/ML

## 2024-02-07 NOTE — TELEPHONE ENCOUNTER
From: Idalia Sarmiento  To: Ronen Chakraborty  Sent: 2/6/2024 8:16 AM EST  Subject: Test results     Hello I was wondering about the results from the ultrasound I took I gave some questions can you call me about them

## 2024-02-07 NOTE — TELEPHONE ENCOUNTER
Spoke to patient about results.     Hematuria- previously seen in prior labs. Never worked up. If present again, will send patient to Urology to have a work up.     U/S- slow growing liver cysts. Previously seen in 2013. Less likely malignant. Liver enzymes stable. No jaundice. Patient would also like to see hepatologist. We will refer.

## 2024-02-10 DIAGNOSIS — R31.9 HEMATURIA, UNSPECIFIED TYPE: ICD-10-CM

## 2024-02-11 LAB — SICKLE CELL SCREEN: NEGATIVE

## 2024-02-13 ENCOUNTER — HOSPITAL ENCOUNTER (OUTPATIENT)
Dept: ULTRASOUND IMAGING | Age: 44
Discharge: HOME | End: 2024-02-15
Attending: OBSTETRICS & GYNECOLOGY
Payer: COMMERCIAL

## 2024-02-13 DIAGNOSIS — R10.2 PELVIC PAIN: ICD-10-CM

## 2024-02-13 DIAGNOSIS — Z87.42 HX OF OVARIAN CYST: ICD-10-CM

## 2024-02-13 DIAGNOSIS — Z13.220 SCREENING, LIPID: ICD-10-CM

## 2024-02-13 DIAGNOSIS — E28.2 PCO (POLYCYSTIC OVARIES): ICD-10-CM

## 2024-02-13 DIAGNOSIS — R73.03 PRE-DIABETES: ICD-10-CM

## 2024-02-13 LAB
CHOLEST SERPL-MCNC: 205 MG/DL (ref 0–199)
HDLC SERPL-MCNC: 49 MG/DL (ref 40–59)
LDLC SERPL CALC-MCNC: 144 MG/DL (ref 0–129)
TRIGL SERPL-MCNC: 61 MG/DL (ref 0–150)

## 2024-02-13 PROCEDURE — 76856 US EXAM PELVIC COMPLETE: CPT

## 2024-02-13 PROCEDURE — 93975 VASCULAR STUDY: CPT

## 2024-02-13 PROCEDURE — 76830 TRANSVAGINAL US NON-OB: CPT

## 2024-02-15 ENCOUNTER — OFFICE VISIT (OUTPATIENT)
Dept: BARIATRICS/WEIGHT MGMT | Age: 44
End: 2024-02-15
Payer: COMMERCIAL

## 2024-02-15 VITALS
HEART RATE: 89 BPM | SYSTOLIC BLOOD PRESSURE: 116 MMHG | WEIGHT: 210.6 LBS | BODY MASS INDEX: 38.76 KG/M2 | OXYGEN SATURATION: 99 % | DIASTOLIC BLOOD PRESSURE: 60 MMHG | HEIGHT: 62 IN

## 2024-02-15 DIAGNOSIS — E66.01 SEVERE OBESITY (BMI 35.0-39.9) WITH COMORBIDITY (HCC): Primary | ICD-10-CM

## 2024-02-15 PROCEDURE — G8427 DOCREV CUR MEDS BY ELIG CLIN: HCPCS | Performed by: SURGERY

## 2024-02-15 PROCEDURE — 1036F TOBACCO NON-USER: CPT | Performed by: SURGERY

## 2024-02-15 PROCEDURE — G8417 CALC BMI ABV UP PARAM F/U: HCPCS | Performed by: SURGERY

## 2024-02-15 PROCEDURE — G8484 FLU IMMUNIZE NO ADMIN: HCPCS | Performed by: SURGERY

## 2024-02-15 PROCEDURE — 99215 OFFICE O/P EST HI 40 MIN: CPT | Performed by: SURGERY

## 2024-02-15 RX ORDER — SODIUM CHLORIDE 9 MG/ML
INJECTION, SOLUTION INTRAVENOUS PRN
OUTPATIENT
Start: 2024-02-15

## 2024-02-15 RX ORDER — SODIUM CHLORIDE 0.9 % (FLUSH) 0.9 %
5-40 SYRINGE (ML) INJECTION PRN
OUTPATIENT
Start: 2024-02-15

## 2024-02-15 RX ORDER — GABAPENTIN 100 MG/1
400 CAPSULE ORAL ONCE
OUTPATIENT
Start: 2024-02-15 | End: 2024-02-15

## 2024-02-15 RX ORDER — SODIUM CHLORIDE, SODIUM LACTATE, POTASSIUM CHLORIDE, CALCIUM CHLORIDE 600; 310; 30; 20 MG/100ML; MG/100ML; MG/100ML; MG/100ML
INJECTION, SOLUTION INTRAVENOUS CONTINUOUS
OUTPATIENT
Start: 2024-02-15

## 2024-02-15 RX ORDER — HEPARIN SODIUM 5000 [USP'U]/ML
5000 INJECTION, SOLUTION INTRAVENOUS; SUBCUTANEOUS ONCE
OUTPATIENT
Start: 2024-02-15 | End: 2024-02-15

## 2024-02-15 RX ORDER — ONDANSETRON 2 MG/ML
4 INJECTION INTRAMUSCULAR; INTRAVENOUS ONCE
OUTPATIENT
Start: 2024-02-15 | End: 2024-02-15

## 2024-02-15 RX ORDER — PHENTERMINE HYDROCHLORIDE 37.5 MG/1
37.5 TABLET ORAL
Qty: 30 TABLET | Refills: 0 | Status: SHIPPED | OUTPATIENT
Start: 2024-02-15 | End: 2024-03-16

## 2024-02-15 RX ORDER — POLYETHYLENE GLYCOL 3350, SODIUM SULFATE ANHYDROUS, SODIUM BICARBONATE, SODIUM CHLORIDE, POTASSIUM CHLORIDE 236; 22.74; 6.74; 5.86; 2.97 G/4L; G/4L; G/4L; G/4L; G/4L
4 POWDER, FOR SOLUTION ORAL ONCE
Qty: 4000 ML | Refills: 0 | Status: SHIPPED | OUTPATIENT
Start: 2024-02-15 | End: 2024-02-15

## 2024-02-15 RX ORDER — SODIUM CHLORIDE 0.9 % (FLUSH) 0.9 %
5-40 SYRINGE (ML) INJECTION EVERY 12 HOURS SCHEDULED
OUTPATIENT
Start: 2024-02-15

## 2024-02-15 RX ORDER — APREPITANT 125 MG/1
125 CAPSULE ORAL ONCE
Qty: 1 CAPSULE | Refills: 0 | Status: SHIPPED | OUTPATIENT
Start: 2024-02-15 | End: 2024-02-15

## 2024-02-15 NOTE — H&P (VIEW-ONLY)
Surgery Consultation    Patient Name:  :  Hospital Day:   Idalia Sarmiento 1980 [unfilled]     Date of Service: 2/15/2024    Subjective:      History of Present Illness:     Idalia Sarmiento  is a 42 y.o. female referred by EMILY Chakraborty for morbid obesity.  Idalia Sarmiento reports multiple episodes of weight loss and regain after multiple diet and exercise programs.        Idalia denies nicotine or any regular alcohol use.  She has some acid reflux symptoms.  Idalia has completed the pre-op process for Bariatric surgery and is currently on the pre-op diet. Per our Psychologist, Dr. Cabrera, there appears to be no active psychiatric contraindications to patient receiving bariatric surgery at this time; there is no evidence of untreated/undertreated clinically significant general psychopathology or substance/alcohol use disorders.      Sleep apnea evaluations have demonstrated KORI.  She uses CPAP.      Past Medical History:   Diagnosis Date    Chiari I malformation (HCC)     Eczema     Migraines     PCO (polycystic ovaries)     S/P complete hysterectomy     Dr Dakotah Cervantes, atypical endocervical cells    Strain of musc/tend the rotator cuff of right shoulder, subs 2019    Type II diabetes mellitus, uncontrolled     Dr Benz    Past Surgical History:   Procedure Laterality Date    HYSTERECTOMY, TOTAL ABDOMINAL (CERVIX REMOVED)      Dr Dakotah Cervantes, cervical atypia    UPPER GASTROINTESTINAL ENDOSCOPY N/A 2023    EGD BIOPSY performed by Jl Chan MD at [facility]        Family History   Problem Relation Age of Onset    Cancer Mother         ovarian? dec age 30    Cancer Father         dec age 60    Colon Cancer Neg Hx     Social History     Tobacco Use    Smoking status: Never     Passive exposure: Never    Smokeless tobacco: Never   Vaping Use    Vaping Use: Never used   Substance Use Topics    Alcohol use: No    Drug use: No        Allergies: Patient has no known

## 2024-02-15 NOTE — PROGRESS NOTES
Surgery Consultation    Patient Name:  :  Hospital Day:   Idalia Sarmiento 1980 [unfilled]     Date of Service: 2/15/2024    Subjective:      History of Present Illness:     Idalia Sarmiento  is a 42 y.o. female referred by EMILY Chakraborty for morbid obesity.  Idalia Sarmiento reports multiple episodes of weight loss and regain after multiple diet and exercise programs.        Idalia denies nicotine or any regular alcohol use.  She has some acid reflux symptoms.  Idalia has completed the pre-op process for Bariatric surgery and is currently on the pre-op diet. Per our Psychologist, Dr. Cabrera, there appears to be no active psychiatric contraindications to patient receiving bariatric surgery at this time; there is no evidence of untreated/undertreated clinically significant general psychopathology or substance/alcohol use disorders.      Sleep apnea evaluations have demonstrated KORI.  She uses CPAP.      Past Medical History:   Diagnosis Date    Chiari I malformation (HCC)     Eczema     Migraines     PCO (polycystic ovaries)     S/P complete hysterectomy     Dr Dakotah Cervantes, atypical endocervical cells    Strain of musc/tend the rotator cuff of right shoulder, subs 2019    Type II diabetes mellitus, uncontrolled     Dr Benz    Past Surgical History:   Procedure Laterality Date    HYSTERECTOMY, TOTAL ABDOMINAL (CERVIX REMOVED)      Dr Dakotah Cervantes, cervical atypia    UPPER GASTROINTESTINAL ENDOSCOPY N/A 2023    EGD BIOPSY performed by Jl Chan MD at [facility] [place] [facility]        Family History   Problem Relation Age of Onset    Cancer Mother         ovarian? dec age 30    Cancer Father         dec age 60    Colon Cancer Neg Hx     Social History     Tobacco Use    Smoking status: Never     Passive exposure: Never    Smokeless tobacco: Never   Vaping Use    Vaping Use: Never used   Substance Use Topics    Alcohol use: No    Drug use: No        Allergies: Patient has no known

## 2024-02-19 ENCOUNTER — NURSE ONLY (OUTPATIENT)
Dept: BARIATRICS/WEIGHT MGMT | Age: 44
End: 2024-02-19

## 2024-02-19 DIAGNOSIS — E66.9 OBESITY (BMI 30-39.9): Primary | ICD-10-CM

## 2024-02-19 NOTE — PROGRESS NOTES
a. 32 ounces    b. 70 ounces    c. 64 ounces   d. 46 ounces    30. What eating behaviors are to be practiced prior to surgery to be prepared on proper eating post-surgery?  Eat protein first, next vegetables, fruit, and lastly whole grains.  Do not drink fluids with meals.  Wait 30 minuets after eating before drinking fluids  Stop eating when you are full  Rule of 20  All of the above        Pati Duran, RN, BSN

## 2024-02-20 ENCOUNTER — OFFICE VISIT (OUTPATIENT)
Dept: OBGYN CLINIC | Age: 44
End: 2024-02-20
Payer: COMMERCIAL

## 2024-02-20 ENCOUNTER — HOSPITAL ENCOUNTER (OUTPATIENT)
Dept: PREADMISSION TESTING | Age: 44
Discharge: HOME | End: 2024-02-24
Payer: COMMERCIAL

## 2024-02-20 VITALS
OXYGEN SATURATION: 98 % | BODY MASS INDEX: 36.57 KG/M2 | TEMPERATURE: 97.3 F | HEIGHT: 64 IN | RESPIRATION RATE: 14 BRPM | WEIGHT: 214.2 LBS | DIASTOLIC BLOOD PRESSURE: 82 MMHG | HEART RATE: 76 BPM | SYSTOLIC BLOOD PRESSURE: 140 MMHG

## 2024-02-20 VITALS
SYSTOLIC BLOOD PRESSURE: 118 MMHG | DIASTOLIC BLOOD PRESSURE: 72 MMHG | HEIGHT: 62 IN | HEART RATE: 91 BPM | WEIGHT: 212 LBS | BODY MASS INDEX: 39.01 KG/M2

## 2024-02-20 DIAGNOSIS — R10.2 PELVIC PAIN: Primary | ICD-10-CM

## 2024-02-20 DIAGNOSIS — Z87.42 HX OF OVARIAN CYST: ICD-10-CM

## 2024-02-20 DIAGNOSIS — N94.10 DYSPAREUNIA IN FEMALE: ICD-10-CM

## 2024-02-20 LAB
ANION GAP SERPL CALCULATED.3IONS-SCNC: 9 MEQ/L (ref 9–15)
BUN SERPL-MCNC: 13 MG/DL (ref 6–20)
CALCIUM SERPL-MCNC: 9.1 MG/DL (ref 8.5–9.9)
CHLORIDE SERPL-SCNC: 103 MEQ/L (ref 95–107)
CO2 SERPL-SCNC: 24 MEQ/L (ref 20–31)
CREAT SERPL-MCNC: 0.6 MG/DL (ref 0.5–0.9)
GLUCOSE SERPL-MCNC: 89 MG/DL (ref 70–99)
POTASSIUM SERPL-SCNC: 3.7 MEQ/L (ref 3.4–4.9)
SODIUM SERPL-SCNC: 136 MEQ/L (ref 135–144)

## 2024-02-20 PROCEDURE — 99213 OFFICE O/P EST LOW 20 MIN: CPT | Performed by: OBSTETRICS & GYNECOLOGY

## 2024-02-20 PROCEDURE — 80048 BASIC METABOLIC PNL TOTAL CA: CPT

## 2024-02-20 PROCEDURE — 1036F TOBACCO NON-USER: CPT | Performed by: OBSTETRICS & GYNECOLOGY

## 2024-02-20 PROCEDURE — G8417 CALC BMI ABV UP PARAM F/U: HCPCS | Performed by: OBSTETRICS & GYNECOLOGY

## 2024-02-20 PROCEDURE — G8484 FLU IMMUNIZE NO ADMIN: HCPCS | Performed by: OBSTETRICS & GYNECOLOGY

## 2024-02-20 PROCEDURE — G8427 DOCREV CUR MEDS BY ELIG CLIN: HCPCS | Performed by: OBSTETRICS & GYNECOLOGY

## 2024-02-20 RX ORDER — POLYETHYLENE GLYCOL-3350 AND ELECTROLYTES 236; 6.74; 5.86; 2.97; 22.74 G/274.31G; G/274.31G; G/274.31G; G/274.31G; G/274.31G
240 POWDER, FOR SOLUTION ORAL ONCE
COMMUNITY
Start: 2024-02-15

## 2024-02-20 RX ORDER — ESTRADIOL 0.1 MG/G
CREAM VAGINAL
Qty: 1 EACH | Refills: 3 | Status: SHIPPED | OUTPATIENT
Start: 2024-02-20

## 2024-02-20 RX ORDER — APREPITANT 125 MG/1
125 CAPSULE ORAL ONCE
COMMUNITY
Start: 2024-02-15

## 2024-02-20 NOTE — PROGRESS NOTES
hydronephrosis or perinephric infiltration. GI/Bowel: No evidence of bowel obstruction.  Stomach is partially collapsed. Appendix is surgically absent. Pelvis: Bladder is decompressed.  The uterus is surgically absent.  Suspect 3.8 cm right adnexal cyst.  There is no significant free fluid. Peritoneum/Retroperitoneum: No free air or significant adenopathy. Bones/Soft Tissues: Suspect H shaped thoracic vertebral bodies.     1. No nephrolithiasis or obstructive uropathy. 2. Subtle 3.9 cm low attenuation lesion in the right lobe of the liver, may represent a cyst.  Correlation with hepatic sonogram recommended. 3. Suspect 3.8 cm right adnexal cyst, status post hysterectomy.  Correlation with pelvic sonogram recommended. 4. Suspect H shaped thoracic vertebral bodies, can be seen in patients with sickle cell disease.  Please correlate with clinical history.     Assessment:      Diagnosis Orders   1. Pelvic pain  US DUP ABD PEL RETRO SCROT COMPLETE    US NON OB TRANSVAGINAL    US PELVIS COMPLETE      2. Hx of ovarian cyst        3. Dyspareunia in female               Plan:     Repeat US in 3 mnth   Start vaginal estrogen   Return in 3 mnth     Orders Placed This Encounter   Procedures    US DUP ABD PEL RETRO SCROT COMPLETE     Standing Status:   Future     Standing Expiration Date:   2/20/2025     Order Specific Question:   Reason for exam:     Answer:   ovarian cyst    US NON OB TRANSVAGINAL     Standing Status:   Future     Standing Expiration Date:   2/19/2025     Order Specific Question:   Reason for exam:     Answer:   ovarian cyst    US PELVIS COMPLETE     Standing Status:   Future     Standing Expiration Date:   2/20/2025     Order Specific Question:   Reason for exam:     Answer:   AUB     Orders Placed This Encounter   Medications    estradiol (ESTRACE VAGINAL) 0.1 MG/GM vaginal cream     Sig: Apply vaginally daily for the first 2 weeks , then 3 times weekly thereafter     Dispense:  1 each     Refill:  3

## 2024-02-26 ENCOUNTER — OFFICE VISIT (OUTPATIENT)
Dept: GASTROENTEROLOGY | Age: 44
End: 2024-02-26
Payer: COMMERCIAL

## 2024-02-26 VITALS
OXYGEN SATURATION: 98 % | WEIGHT: 207 LBS | SYSTOLIC BLOOD PRESSURE: 118 MMHG | HEART RATE: 107 BPM | DIASTOLIC BLOOD PRESSURE: 70 MMHG | BODY MASS INDEX: 36.09 KG/M2

## 2024-02-26 DIAGNOSIS — Z12.11 ENCOUNTER FOR SCREENING COLONOSCOPY: Primary | ICD-10-CM

## 2024-02-26 DIAGNOSIS — K76.9 LIVER LESION: ICD-10-CM

## 2024-02-26 PROCEDURE — 99204 OFFICE O/P NEW MOD 45 MIN: CPT | Performed by: INTERNAL MEDICINE

## 2024-02-26 PROCEDURE — G8427 DOCREV CUR MEDS BY ELIG CLIN: HCPCS | Performed by: INTERNAL MEDICINE

## 2024-02-26 PROCEDURE — 1036F TOBACCO NON-USER: CPT | Performed by: INTERNAL MEDICINE

## 2024-02-26 PROCEDURE — G8417 CALC BMI ABV UP PARAM F/U: HCPCS | Performed by: INTERNAL MEDICINE

## 2024-02-26 PROCEDURE — G8484 FLU IMMUNIZE NO ADMIN: HCPCS | Performed by: INTERNAL MEDICINE

## 2024-02-26 RX ORDER — SODIUM PICOSULFATE, MAGNESIUM OXIDE, AND ANHYDROUS CITRIC ACID 10; 3.5; 12 MG/160ML; G/160ML; G/160ML
LIQUID ORAL
Qty: 320 ML | Refills: 0 | Status: SHIPPED | OUTPATIENT
Start: 2024-02-26

## 2024-02-26 ASSESSMENT — ENCOUNTER SYMPTOMS
VOICE CHANGE: 0
ABDOMINAL DISTENTION: 0
BLOOD IN STOOL: 0
WHEEZING: 0
COLOR CHANGE: 0
ABDOMINAL PAIN: 1
EYE PAIN: 0
CHEST TIGHTNESS: 0
PHOTOPHOBIA: 0
VOMITING: 0
DIARRHEA: 0
TROUBLE SWALLOWING: 0
SHORTNESS OF BREATH: 0
NAUSEA: 0
RECTAL PAIN: 0
CONSTIPATION: 0
EYE REDNESS: 0

## 2024-02-26 NOTE — PROGRESS NOTES
Subjective:      Patient ID: Idalia Sarmiento is a 43 y.o. female who presents today for:  Chief Complaint   Patient presents with    Abdominal Pain       HPI  This is a very pleasant 43-year-old who came in today for further evaluation management.  Patient mentioned that she had an imaging of the liver and was found to have liver lesion and subsequently referred to hepatology for further assessment.  Patient also reports lower abdominal pain and point toward the right lower quadrant.  Patient had CT scan that showed liver cyst in addition was found to have subtle 3.9 cm lesion in the right hepatic lobe additionally was found to have 3.8 right adnexal cyst.  Patient had liver ultrasound and also was referred to gynecology for further assessment.  Patient also had liver ultrasound, the lesion which was reported as exophytic 4.9 cm extending from inferior margin of the right hepatic lobe was seen previously in July 2013 however increased in size comparing to previous.  In fact in 2013 the lesion was around 2.7 cm.  Patient denies of jaundice or easy bruising admission related to liver condition.  Patient has not had colonoscopy.  She mentioned that she is scheduled for bariatric surgery.  Patient does have BMI of 36 class II obesity with diabetes mellitus and polycystic ovarian syndrome.  No diarrhea, constipation reported.  No melena or hematochezia.  Patient came in today for further evaluation management      Past Medical History:   Diagnosis Date    Chiari I malformation (HCC)     Eczema     Migraines 2010    PCO (polycystic ovaries)     S/P complete hysterectomy 2012    Dr Dakotah Cervantes, atypical endocervical cells    Strain of musc/tend the rotator cuff of right shoulder, subs 12/12/2019    Type II diabetes mellitus, uncontrolled     Dr Benz     Past Surgical History:   Procedure Laterality Date    HYSTERECTOMY, TOTAL ABDOMINAL (CERVIX REMOVED)  2012    Dr Dakotah Cervantes, cervical atypia    UPPER GASTROINTESTINAL ENDOSCOPY

## 2024-02-29 ENCOUNTER — ANESTHESIA EVENT (OUTPATIENT)
Dept: OPERATING ROOM | Age: 44
DRG: 403 | End: 2024-02-29
Payer: COMMERCIAL

## 2024-03-04 ENCOUNTER — HOSPITAL ENCOUNTER (INPATIENT)
Age: 44
LOS: 1 days | Discharge: HOME | DRG: 403 | End: 2024-03-05
Attending: SURGERY | Admitting: SURGERY
Payer: COMMERCIAL

## 2024-03-04 ENCOUNTER — ANESTHESIA (OUTPATIENT)
Dept: OPERATING ROOM | Age: 44
DRG: 403 | End: 2024-03-04
Payer: COMMERCIAL

## 2024-03-04 DIAGNOSIS — E66.01 SEVERE OBESITY (BMI 35.0-39.9) WITH COMORBIDITY (HCC): Primary | ICD-10-CM

## 2024-03-04 LAB
GLUCOSE BLD-MCNC: 140 MG/DL (ref 70–99)
GLUCOSE BLD-MCNC: 78 MG/DL (ref 70–99)
PERFORMED ON: ABNORMAL
PERFORMED ON: NORMAL

## 2024-03-04 PROCEDURE — S2900 ROBOTIC SURGICAL SYSTEM: HCPCS | Performed by: SURGERY

## 2024-03-04 PROCEDURE — 6360000002 HC RX W HCPCS: Performed by: NURSE ANESTHETIST, CERTIFIED REGISTERED

## 2024-03-04 PROCEDURE — 3600000009 HC SURGERY ROBOT BASE: Performed by: SURGERY

## 2024-03-04 PROCEDURE — 6360000002 HC RX W HCPCS: Performed by: ANESTHESIOLOGY

## 2024-03-04 PROCEDURE — 6360000002 HC RX W HCPCS: Performed by: SURGERY

## 2024-03-04 PROCEDURE — 2500000003 HC RX 250 WO HCPCS: Performed by: NURSE ANESTHETIST, CERTIFIED REGISTERED

## 2024-03-04 PROCEDURE — 6370000000 HC RX 637 (ALT 250 FOR IP): Performed by: SURGERY

## 2024-03-04 PROCEDURE — 7100000000 HC PACU RECOVERY - FIRST 15 MIN: Performed by: SURGERY

## 2024-03-04 PROCEDURE — 3E0T3BZ INTRODUCTION OF ANESTHETIC AGENT INTO PERIPHERAL NERVES AND PLEXI, PERCUTANEOUS APPROACH: ICD-10-PCS | Performed by: ANESTHESIOLOGY

## 2024-03-04 PROCEDURE — 3600000019 HC SURGERY ROBOT ADDTL 15MIN: Performed by: SURGERY

## 2024-03-04 PROCEDURE — A4217 STERILE WATER/SALINE, 500 ML: HCPCS | Performed by: SURGERY

## 2024-03-04 PROCEDURE — C1713 ANCHOR/SCREW BN/BN,TIS/BN: HCPCS | Performed by: SURGERY

## 2024-03-04 PROCEDURE — 64488 TAP BLOCK BI INJECTION: CPT | Performed by: ANESTHESIOLOGY

## 2024-03-04 PROCEDURE — 2580000003 HC RX 258: Performed by: SURGERY

## 2024-03-04 PROCEDURE — 7100000001 HC PACU RECOVERY - ADDTL 15 MIN: Performed by: SURGERY

## 2024-03-04 PROCEDURE — 0D164ZA BYPASS STOMACH TO JEJUNUM, PERCUTANEOUS ENDOSCOPIC APPROACH: ICD-10-PCS | Performed by: SURGERY

## 2024-03-04 PROCEDURE — 8E0W4CZ ROBOTIC ASSISTED PROCEDURE OF TRUNK REGION, PERCUTANEOUS ENDOSCOPIC APPROACH: ICD-10-PCS | Performed by: SURGERY

## 2024-03-04 PROCEDURE — 3700000000 HC ANESTHESIA ATTENDED CARE: Performed by: SURGERY

## 2024-03-04 PROCEDURE — 43644 LAP GASTRIC BYPASS/ROUX-EN-Y: CPT | Performed by: SURGERY

## 2024-03-04 PROCEDURE — 1210000000 HC MED SURG R&B

## 2024-03-04 PROCEDURE — 2720000010 HC SURG SUPPLY STERILE: Performed by: SURGERY

## 2024-03-04 PROCEDURE — 3700000001 HC ADD 15 MINUTES (ANESTHESIA): Performed by: SURGERY

## 2024-03-04 PROCEDURE — 2709999900 HC NON-CHARGEABLE SUPPLY: Performed by: SURGERY

## 2024-03-04 RX ORDER — ONDANSETRON 2 MG/ML
4 INJECTION INTRAMUSCULAR; INTRAVENOUS EVERY 6 HOURS PRN
Status: DISCONTINUED | OUTPATIENT
Start: 2024-03-04 | End: 2024-03-05 | Stop reason: HOSPADM

## 2024-03-04 RX ORDER — OXYCODONE HYDROCHLORIDE 5 MG/1
5 TABLET ORAL EVERY 4 HOURS PRN
Status: DISCONTINUED | OUTPATIENT
Start: 2024-03-05 | End: 2024-03-05 | Stop reason: HOSPADM

## 2024-03-04 RX ORDER — ROPIVACAINE HYDROCHLORIDE 5 MG/ML
INJECTION, SOLUTION EPIDURAL; INFILTRATION; PERINEURAL PRN
Status: DISCONTINUED | OUTPATIENT
Start: 2024-03-04 | End: 2024-03-04 | Stop reason: SDUPTHER

## 2024-03-04 RX ORDER — SODIUM CHLORIDE, SODIUM LACTATE, POTASSIUM CHLORIDE, CALCIUM CHLORIDE 600; 310; 30; 20 MG/100ML; MG/100ML; MG/100ML; MG/100ML
INJECTION, SOLUTION INTRAVENOUS
Status: DISPENSED
Start: 2024-03-04 | End: 2024-03-05

## 2024-03-04 RX ORDER — DIPHENHYDRAMINE HYDROCHLORIDE 50 MG/ML
12.5 INJECTION INTRAMUSCULAR; INTRAVENOUS
Status: DISCONTINUED | OUTPATIENT
Start: 2024-03-04 | End: 2024-03-04 | Stop reason: HOSPADM

## 2024-03-04 RX ORDER — FENTANYL CITRATE 0.05 MG/ML
50 INJECTION, SOLUTION INTRAMUSCULAR; INTRAVENOUS EVERY 10 MIN PRN
Status: DISCONTINUED | OUTPATIENT
Start: 2024-03-04 | End: 2024-03-04 | Stop reason: HOSPADM

## 2024-03-04 RX ORDER — HEPARIN SODIUM 5000 [USP'U]/ML
5000 INJECTION, SOLUTION INTRAVENOUS; SUBCUTANEOUS ONCE
Status: COMPLETED | OUTPATIENT
Start: 2024-03-04 | End: 2024-03-04

## 2024-03-04 RX ORDER — SODIUM CHLORIDE 0.9 % (FLUSH) 0.9 %
5-40 SYRINGE (ML) INJECTION PRN
Status: DISCONTINUED | OUTPATIENT
Start: 2024-03-04 | End: 2024-03-04 | Stop reason: HOSPADM

## 2024-03-04 RX ORDER — SODIUM CHLORIDE 0.9 % (FLUSH) 0.9 %
5-40 SYRINGE (ML) INJECTION EVERY 12 HOURS SCHEDULED
Status: DISCONTINUED | OUTPATIENT
Start: 2024-03-04 | End: 2024-03-05 | Stop reason: HOSPADM

## 2024-03-04 RX ORDER — SODIUM CHLORIDE 0.9 % (FLUSH) 0.9 %
5-40 SYRINGE (ML) INJECTION PRN
Status: DISCONTINUED | OUTPATIENT
Start: 2024-03-04 | End: 2024-03-05 | Stop reason: HOSPADM

## 2024-03-04 RX ORDER — HYDROMORPHONE HYDROCHLORIDE 1 MG/ML
1 INJECTION, SOLUTION INTRAMUSCULAR; INTRAVENOUS; SUBCUTANEOUS
Status: DISCONTINUED | OUTPATIENT
Start: 2024-03-04 | End: 2024-03-05 | Stop reason: HOSPADM

## 2024-03-04 RX ORDER — DEXAMETHASONE SODIUM PHOSPHATE 10 MG/ML
INJECTION INTRAMUSCULAR; INTRAVENOUS PRN
Status: DISCONTINUED | OUTPATIENT
Start: 2024-03-04 | End: 2024-03-04 | Stop reason: SDUPTHER

## 2024-03-04 RX ORDER — MEPERIDINE HYDROCHLORIDE 25 MG/ML
12.5 INJECTION INTRAMUSCULAR; INTRAVENOUS; SUBCUTANEOUS
Status: DISCONTINUED | OUTPATIENT
Start: 2024-03-04 | End: 2024-03-04 | Stop reason: HOSPADM

## 2024-03-04 RX ORDER — ONDANSETRON 2 MG/ML
INJECTION INTRAMUSCULAR; INTRAVENOUS PRN
Status: DISCONTINUED | OUTPATIENT
Start: 2024-03-04 | End: 2024-03-04 | Stop reason: SDUPTHER

## 2024-03-04 RX ORDER — OXYCODONE HYDROCHLORIDE 5 MG/1
5 TABLET ORAL
Status: DISCONTINUED | OUTPATIENT
Start: 2024-03-04 | End: 2024-03-04 | Stop reason: HOSPADM

## 2024-03-04 RX ORDER — SODIUM CHLORIDE 9 MG/ML
INJECTION, SOLUTION INTRAVENOUS CONTINUOUS
Status: DISCONTINUED | OUTPATIENT
Start: 2024-03-04 | End: 2024-03-05 | Stop reason: HOSPADM

## 2024-03-04 RX ORDER — FENTANYL CITRATE 50 UG/ML
INJECTION, SOLUTION INTRAMUSCULAR; INTRAVENOUS PRN
Status: DISCONTINUED | OUTPATIENT
Start: 2024-03-04 | End: 2024-03-04 | Stop reason: SDUPTHER

## 2024-03-04 RX ORDER — BUPRENORPHINE HYDROCHLORIDE 0.32 MG/ML
INJECTION INTRAMUSCULAR; INTRAVENOUS PRN
Status: DISCONTINUED | OUTPATIENT
Start: 2024-03-04 | End: 2024-03-04 | Stop reason: SDUPTHER

## 2024-03-04 RX ORDER — ACETAMINOPHEN 325 MG/1
650 TABLET ORAL EVERY 6 HOURS
Status: DISCONTINUED | OUTPATIENT
Start: 2024-03-05 | End: 2024-03-05 | Stop reason: HOSPADM

## 2024-03-04 RX ORDER — METOCLOPRAMIDE HYDROCHLORIDE 5 MG/ML
10 INJECTION INTRAMUSCULAR; INTRAVENOUS
Status: DISCONTINUED | OUTPATIENT
Start: 2024-03-04 | End: 2024-03-04 | Stop reason: HOSPADM

## 2024-03-04 RX ORDER — MAGNESIUM HYDROXIDE 1200 MG/15ML
LIQUID ORAL CONTINUOUS PRN
Status: DISCONTINUED | OUTPATIENT
Start: 2024-03-04 | End: 2024-03-04 | Stop reason: HOSPADM

## 2024-03-04 RX ORDER — SODIUM CHLORIDE 0.9 % (FLUSH) 0.9 %
5-40 SYRINGE (ML) INJECTION EVERY 12 HOURS SCHEDULED
Status: DISCONTINUED | OUTPATIENT
Start: 2024-03-04 | End: 2024-03-04 | Stop reason: HOSPADM

## 2024-03-04 RX ORDER — OXYCODONE HYDROCHLORIDE 5 MG/1
10 TABLET ORAL EVERY 4 HOURS PRN
Status: DISCONTINUED | OUTPATIENT
Start: 2024-03-05 | End: 2024-03-05 | Stop reason: HOSPADM

## 2024-03-04 RX ORDER — SODIUM CHLORIDE 9 MG/ML
INJECTION, SOLUTION INTRAVENOUS PRN
Status: DISCONTINUED | OUTPATIENT
Start: 2024-03-04 | End: 2024-03-05 | Stop reason: HOSPADM

## 2024-03-04 RX ORDER — ONDANSETRON 4 MG/1
4 TABLET, ORALLY DISINTEGRATING ORAL EVERY 8 HOURS PRN
Status: DISCONTINUED | OUTPATIENT
Start: 2024-03-04 | End: 2024-03-05 | Stop reason: HOSPADM

## 2024-03-04 RX ORDER — SODIUM CHLORIDE 9 MG/ML
INJECTION, SOLUTION INTRAVENOUS PRN
Status: DISCONTINUED | OUTPATIENT
Start: 2024-03-04 | End: 2024-03-04 | Stop reason: HOSPADM

## 2024-03-04 RX ORDER — SODIUM CHLORIDE, SODIUM LACTATE, POTASSIUM CHLORIDE, CALCIUM CHLORIDE 600; 310; 30; 20 MG/100ML; MG/100ML; MG/100ML; MG/100ML
INJECTION, SOLUTION INTRAVENOUS CONTINUOUS
Status: DISCONTINUED | OUTPATIENT
Start: 2024-03-04 | End: 2024-03-04 | Stop reason: HOSPADM

## 2024-03-04 RX ORDER — LIDOCAINE HYDROCHLORIDE 10 MG/ML
INJECTION, SOLUTION EPIDURAL; INFILTRATION; INTRACAUDAL; PERINEURAL PRN
Status: DISCONTINUED | OUTPATIENT
Start: 2024-03-04 | End: 2024-03-04 | Stop reason: SDUPTHER

## 2024-03-04 RX ORDER — ONDANSETRON 2 MG/ML
4 INJECTION INTRAMUSCULAR; INTRAVENOUS
Status: DISCONTINUED | OUTPATIENT
Start: 2024-03-04 | End: 2024-03-04 | Stop reason: HOSPADM

## 2024-03-04 RX ORDER — ONDANSETRON 2 MG/ML
4 INJECTION INTRAMUSCULAR; INTRAVENOUS ONCE
Status: COMPLETED | OUTPATIENT
Start: 2024-03-04 | End: 2024-03-04

## 2024-03-04 RX ORDER — ENOXAPARIN SODIUM 100 MG/ML
40 INJECTION SUBCUTANEOUS EVERY 12 HOURS SCHEDULED
Status: DISCONTINUED | OUTPATIENT
Start: 2024-03-04 | End: 2024-03-05 | Stop reason: HOSPADM

## 2024-03-04 RX ORDER — MIDAZOLAM HYDROCHLORIDE 1 MG/ML
INJECTION INTRAMUSCULAR; INTRAVENOUS PRN
Status: DISCONTINUED | OUTPATIENT
Start: 2024-03-04 | End: 2024-03-04 | Stop reason: SDUPTHER

## 2024-03-04 RX ORDER — ROCURONIUM BROMIDE 10 MG/ML
INJECTION, SOLUTION INTRAVENOUS PRN
Status: DISCONTINUED | OUTPATIENT
Start: 2024-03-04 | End: 2024-03-04 | Stop reason: SDUPTHER

## 2024-03-04 RX ORDER — PROPOFOL 10 MG/ML
INJECTION, EMULSION INTRAVENOUS PRN
Status: DISCONTINUED | OUTPATIENT
Start: 2024-03-04 | End: 2024-03-04 | Stop reason: SDUPTHER

## 2024-03-04 RX ADMIN — SUGAMMADEX 200 MG: 100 INJECTION, SOLUTION INTRAVENOUS at 14:08

## 2024-03-04 RX ADMIN — FENTANYL CITRATE 100 MCG: 50 INJECTION, SOLUTION INTRAMUSCULAR; INTRAVENOUS at 11:35

## 2024-03-04 RX ADMIN — SODIUM CHLORIDE, POTASSIUM CHLORIDE, SODIUM LACTATE AND CALCIUM CHLORIDE: 600; 310; 30; 20 INJECTION, SOLUTION INTRAVENOUS at 10:41

## 2024-03-04 RX ADMIN — GABAPENTIN 400 MG: 300 CAPSULE ORAL at 10:42

## 2024-03-04 RX ADMIN — SODIUM CHLORIDE 3000 MG: 900 INJECTION INTRAVENOUS at 21:07

## 2024-03-04 RX ADMIN — ONDANSETRON 4 MG: 2 INJECTION INTRAMUSCULAR; INTRAVENOUS at 10:43

## 2024-03-04 RX ADMIN — MIDAZOLAM HYDROCHLORIDE 2 MG: 1 INJECTION, SOLUTION INTRAMUSCULAR; INTRAVENOUS at 11:31

## 2024-03-04 RX ADMIN — ENOXAPARIN SODIUM 40 MG: 100 INJECTION SUBCUTANEOUS at 21:04

## 2024-03-04 RX ADMIN — SODIUM CHLORIDE, POTASSIUM CHLORIDE, SODIUM LACTATE AND CALCIUM CHLORIDE: 600; 310; 30; 20 INJECTION, SOLUTION INTRAVENOUS at 12:51

## 2024-03-04 RX ADMIN — ONDANSETRON 4 MG: 2 INJECTION INTRAMUSCULAR; INTRAVENOUS at 14:06

## 2024-03-04 RX ADMIN — ROCURONIUM BROMIDE 50 MG: 10 INJECTION, SOLUTION INTRAVENOUS at 11:35

## 2024-03-04 RX ADMIN — ROPIVACAINE HYDROCHLORIDE 40 ML: 5 INJECTION, SOLUTION EPIDURAL; INFILTRATION; PERINEURAL at 11:42

## 2024-03-04 RX ADMIN — SODIUM CHLORIDE: 9 INJECTION, SOLUTION INTRAVENOUS at 17:51

## 2024-03-04 RX ADMIN — PROPOFOL 200 MG: 10 INJECTION, EMULSION INTRAVENOUS at 11:35

## 2024-03-04 RX ADMIN — HYDROMORPHONE HYDROCHLORIDE 1 MG: 1 INJECTION, SOLUTION INTRAMUSCULAR; INTRAVENOUS; SUBCUTANEOUS at 18:00

## 2024-03-04 RX ADMIN — DEXAMETHASONE SODIUM PHOSPHATE 10 MG: 10 INJECTION INTRAMUSCULAR; INTRAVENOUS at 11:45

## 2024-03-04 RX ADMIN — CEFAZOLIN 2000 MG: 2 INJECTION, POWDER, FOR SOLUTION INTRAMUSCULAR; INTRAVENOUS at 11:31

## 2024-03-04 RX ADMIN — LIDOCAINE HYDROCHLORIDE 50 MG: 10 INJECTION, SOLUTION EPIDURAL; INFILTRATION; INTRACAUDAL; PERINEURAL at 11:35

## 2024-03-04 RX ADMIN — HYDROMORPHONE HYDROCHLORIDE 1 MG: 1 INJECTION, SOLUTION INTRAMUSCULAR; INTRAVENOUS; SUBCUTANEOUS at 22:40

## 2024-03-04 RX ADMIN — HEPARIN SODIUM 5000 UNITS: 5000 INJECTION INTRAVENOUS; SUBCUTANEOUS at 10:42

## 2024-03-04 RX ADMIN — BUPRENORPHINE HYDROCHLORIDE 0.3 MG: 0.32 INJECTION INTRAMUSCULAR; INTRAVENOUS at 11:42

## 2024-03-04 ASSESSMENT — PAIN DESCRIPTION - DESCRIPTORS: DESCRIPTORS: ACHING;DISCOMFORT

## 2024-03-04 ASSESSMENT — PAIN SCALES - GENERAL
PAINLEVEL_OUTOF10: 7
PAINLEVEL_OUTOF10: 5
PAINLEVEL_OUTOF10: 5
PAINLEVEL_OUTOF10: 7

## 2024-03-04 ASSESSMENT — PAIN DESCRIPTION - LOCATION
LOCATION: ABDOMEN

## 2024-03-04 ASSESSMENT — PAIN DESCRIPTION - ORIENTATION
ORIENTATION: MID
ORIENTATION: RIGHT;LEFT;MID

## 2024-03-04 NOTE — ANESTHESIA PRE PROCEDURE
anesthesiologist reviewed and agrees with Preprocedure content      Post-op pain plan if not by surgeon: eliana Valdez MD   3/4/2024

## 2024-03-04 NOTE — INTERVAL H&P NOTE
Update History & Physical    The patient's History and Physical of March 4, 2023 was reviewed with the patient and I examined the patient. There was no change. The surgical site was confirmed by the patient and me.     Plan: The risks, benefits, expected outcome, and alternative to the recommended procedure have been discussed with the patient. Patient understands and wants to proceed with the procedure.     Electronically signed by Jl Chan MD on 3/4/2024 at 11:16 AM

## 2024-03-04 NOTE — OP NOTE
counts were reported as correct at the end of the case and the patient tolerated the procedure well.    Disposition: To PACU.    Jl Chan MD, FASMBS, FACS, MBSAQIP Verified Surgeon  [id number], Diplomate of The American Board of Surgery      Electronically signed by Jl Chan MD on 3/4/2024 at 2:30 PM

## 2024-03-04 NOTE — ANESTHESIA POSTPROCEDURE EVALUATION
Department of Anesthesiology  Postprocedure Note    Patient: Idalia Sarmiento  MRN: [id number]  YOB: 1980  Date of evaluation: 3/4/2024    Procedure Summary       Date: 03/04/24 Room / Location: [address]    Anesthesia Start: 1132 Anesthesia Stop: 1417    Procedure: GASTRIC BYPASS ARINA-EN-Y LAPAROSCOPIC/ROBOTIC, modifier 22 (Abdomen) Diagnosis:       Morbid (severe) obesity due to excess calories (HCC)      KORI (obstructive sleep apnea)      Gastroesophageal reflux disease      Other abnormal glucose      Polycystic ovarian syndrome      (Morbid (severe) obesity due to excess calories (HCC) [E66.01])      (KORI (obstructive sleep apnea) [G47.33])      (Gastroesophageal reflux disease [K21.9])      (Other abnormal glucose [R73.09])      (Polycystic ovarian syndrome [E28.2])    Surgeons: Jl Chan MD Responsible Provider: Trevor Valdez MD    Anesthesia Type: general, regional ASA Status: 3            Anesthesia Type: No value filed.    Myrtle Phase I: Myrtle Score: 10    Myrtle Phase II:      Anesthesia Post Evaluation    Patient location during evaluation: PACU  Patient participation: complete - patient cannot participate  Level of consciousness: awake  Pain score: 0  Airway patency: patent  Nausea & Vomiting: no vomiting and no nausea  Cardiovascular status: blood pressure returned to baseline  Respiratory status: acceptable  Hydration status: stable  Pain management: adequate        No notable events documented.

## 2024-03-04 NOTE — ANESTHESIA PROCEDURE NOTES
Peripheral Block    Patient location during procedure: OR  Reason for block: post-op pain management and at surgeon's request  Start time: 3/4/2024 11:36 AM  End time: 3/4/2024 11:46 AM  Staffing  Performed: anesthesiologist   Anesthesiologist: Trevor Valdez MD  Performed by: Trevor Valdez MD  Authorized by: Trevor Valdez MD    Preanesthetic Checklist  Completed: patient identified, IV checked, site marked, risks and benefits discussed, surgical/procedural consents, equipment checked, pre-op evaluation, timeout performed, anesthesia consent given, oxygen available and monitors applied/VS acknowledged  Peripheral Block   Patient position: supine  Prep: ChloraPrep  Provider prep: mask and sterile gloves (Sterile probe cover)  Patient monitoring: cardiac monitor, continuous pulse ox, frequent blood pressure checks and IV access  Block type: TAP and Rectus sheath  Laterality: bilateral  Injection technique: single-shot  Guidance: ultrasound guided  Local infiltration: ropivacaine (20 ML OF NS and 0.3 mg of Bupreorphine added)  Infiltration strength: 0.5 %  Local infiltration: ropivacaine (20 ML OF NS and 0.3 mg of Bupreorphine added)  Dose: 40 mL    Needle   Needle type: combined needle/nerve stimulator   Needle gauge: 21 G  Needle localization: anatomical landmarks and ultrasound guidance  Needle length: 10 cm  Assessment   Injection assessment: negative aspiration for heme, no paresthesia on injection and local visualized surrounding nerve on ultrasound  Paresthesia pain: immediately resolved  Slow fractionated injection: yes  Hemodynamics: stable    Additional Notes  Ultrasound image printed and saved in patient chart.    Sterile probe cover used

## 2024-03-05 VITALS
BODY MASS INDEX: 39.56 KG/M2 | WEIGHT: 214.95 LBS | TEMPERATURE: 97.9 F | DIASTOLIC BLOOD PRESSURE: 70 MMHG | HEART RATE: 76 BPM | SYSTOLIC BLOOD PRESSURE: 116 MMHG | OXYGEN SATURATION: 100 % | HEIGHT: 62 IN | RESPIRATION RATE: 18 BRPM

## 2024-03-05 LAB
BASOPHILS # BLD: 0 K/UL (ref 0–0.2)
BASOPHILS NFR BLD: 0.1 %
EOSINOPHIL # BLD: 0 K/UL (ref 0–0.7)
EOSINOPHIL NFR BLD: 0 %
ERYTHROCYTE [DISTWIDTH] IN BLOOD BY AUTOMATED COUNT: 12.9 % (ref 11.5–14.5)
HCT VFR BLD AUTO: 36.7 % (ref 37–47)
HGB BLD-MCNC: 11.8 G/DL (ref 12–16)
LYMPHOCYTES # BLD: 1.1 K/UL (ref 1–4.8)
LYMPHOCYTES NFR BLD: 10.8 %
MCH RBC QN AUTO: 29.1 PG (ref 27–31.3)
MCHC RBC AUTO-ENTMCNC: 32.2 % (ref 33–37)
MCV RBC AUTO: 90.4 FL (ref 79.4–94.8)
MONOCYTES # BLD: 0.7 K/UL (ref 0.2–0.8)
MONOCYTES NFR BLD: 7 %
NEUTROPHILS # BLD: 8.5 K/UL (ref 1.4–6.5)
NEUTS SEG NFR BLD: 81.7 %
PLATELET # BLD AUTO: 276 K/UL (ref 130–400)
RBC # BLD AUTO: 4.06 M/UL (ref 4.2–5.4)
WBC # BLD AUTO: 10.4 K/UL (ref 4.8–10.8)

## 2024-03-05 PROCEDURE — 85025 COMPLETE CBC W/AUTO DIFF WBC: CPT

## 2024-03-05 PROCEDURE — 2700000000 HC OXYGEN THERAPY PER DAY

## 2024-03-05 PROCEDURE — 6370000000 HC RX 637 (ALT 250 FOR IP): Performed by: SURGERY

## 2024-03-05 PROCEDURE — 2580000003 HC RX 258: Performed by: SURGERY

## 2024-03-05 PROCEDURE — 6360000002 HC RX W HCPCS: Performed by: SURGERY

## 2024-03-05 PROCEDURE — 36415 COLL VENOUS BLD VENIPUNCTURE: CPT

## 2024-03-05 RX ORDER — OXYCODONE HYDROCHLORIDE 5 MG/1
5 TABLET ORAL EVERY 6 HOURS PRN
Qty: 20 TABLET | Refills: 0 | Status: ON HOLD | OUTPATIENT
Start: 2024-03-05 | End: 2024-03-08 | Stop reason: HOSPADM

## 2024-03-05 RX ORDER — GABAPENTIN 300 MG/1
300 CAPSULE ORAL 2 TIMES DAILY
Qty: 10 CAPSULE | Refills: 0 | Status: ON HOLD | OUTPATIENT
Start: 2024-03-05 | End: 2024-03-08 | Stop reason: HOSPADM

## 2024-03-05 RX ORDER — PALONOSETRON 0.05 MG/ML
0.25 INJECTION, SOLUTION INTRAVENOUS ONCE
Status: COMPLETED | OUTPATIENT
Start: 2024-03-05 | End: 2024-03-05

## 2024-03-05 RX ORDER — SIMETHICONE 80 MG
80 TABLET,CHEWABLE ORAL 4 TIMES DAILY PRN
Qty: 180 TABLET | Refills: 3 | Status: SHIPPED | OUTPATIENT
Start: 2024-03-05

## 2024-03-05 RX ORDER — ACETAMINOPHEN 500 MG
500 TABLET ORAL 4 TIMES DAILY PRN
Qty: 120 TABLET | Refills: 0 | Status: ON HOLD | OUTPATIENT
Start: 2024-03-05 | End: 2024-03-08 | Stop reason: HOSPADM

## 2024-03-05 RX ORDER — ONDANSETRON 4 MG/1
4 TABLET, ORALLY DISINTEGRATING ORAL EVERY 8 HOURS PRN
Qty: 30 TABLET | Refills: 3 | Status: SHIPPED | OUTPATIENT
Start: 2024-03-05

## 2024-03-05 RX ADMIN — HYDROMORPHONE HYDROCHLORIDE 1 MG: 1 INJECTION, SOLUTION INTRAMUSCULAR; INTRAVENOUS; SUBCUTANEOUS at 07:49

## 2024-03-05 RX ADMIN — SODIUM CHLORIDE: 9 INJECTION, SOLUTION INTRAVENOUS at 03:13

## 2024-03-05 RX ADMIN — ENOXAPARIN SODIUM 40 MG: 100 INJECTION SUBCUTANEOUS at 09:14

## 2024-03-05 RX ADMIN — SODIUM CHLORIDE 3000 MG: 900 INJECTION INTRAVENOUS at 05:07

## 2024-03-05 RX ADMIN — ACETAMINOPHEN 325MG 650 MG: 325 TABLET ORAL at 09:13

## 2024-03-05 RX ADMIN — SODIUM CHLORIDE: 9 INJECTION, SOLUTION INTRAVENOUS at 11:43

## 2024-03-05 RX ADMIN — SODIUM CHLORIDE, PRESERVATIVE FREE 10 ML: 5 INJECTION INTRAVENOUS at 09:14

## 2024-03-05 RX ADMIN — PALONOSETRON 0.25 MG: 0.05 INJECTION, SOLUTION INTRAVENOUS at 10:50

## 2024-03-05 ASSESSMENT — PAIN SCALES - GENERAL
PAINLEVEL_OUTOF10: 7
PAINLEVEL_OUTOF10: 5
PAINLEVEL_OUTOF10: 6

## 2024-03-05 ASSESSMENT — PAIN DESCRIPTION - LOCATION
LOCATION: ABDOMEN

## 2024-03-05 NOTE — CARE COORDINATION
Case Management Assessment  Initial Evaluation    Date/Time of Evaluation: 3/5/2024 10:27 AM  Assessment Completed by: Monet Guerrero RN    If patient is discharged prior to next notation, then this note serves as note for discharge by case management.    Patient Name: Idalia Sarmiento                   YOB: 1980  Diagnosis: Morbid (severe) obesity due to excess calories (HCC) [E66.01]  KORI (obstructive sleep apnea) [G47.33]  Gastroesophageal reflux disease [K21.9]  Other abnormal glucose [R73.09]  Polycystic ovarian syndrome [E28.2]  Severe obesity (BMI 35.0-39.9) with comorbidity (HCC) [E66.01]                   Date / Time: 3/4/2024  9:51 AM    Patient Admission Status: Inpatient   Readmission Risk (Low < 19, Mod (19-27), High > 27): Readmission Risk Score: 4.7    Current PCP: Ronen Chakraborty PA  PCP verified by CM? Yes    Chart Reviewed: Yes      History Provided by: Patient  Patient Orientation: Alert and Oriented    Patient Cognition: Alert    Hospitalization in the last 30 days (Readmission):  No    If yes, Readmission Assessment in CM Navigator will be completed.    Advance Directives:      Code Status: Full Code   Patient's Primary Decision Maker is:        Discharge Planning:    Patient lives with: Family Members Type of Home: Apartment  Primary Care Giver:    Patient Support Systems include: Children, Family Members   Current Financial resources:    Current community resources:    Current services prior to admission: None            Current DME:              Type of Home Care services:  None    ADLS  Prior functional level: Independent in ADLs/IADLs  Current functional level: Independent in ADLs/IADLs    PT AM-PAC:   /24  OT AM-PAC:   /24    Family can provide assistance at DC: Yes  Would you like Case Management to discuss the discharge plan with any other family members/significant others, and if so, who? No  Plans to Return to Present Housing: Yes  Other Identified Issues/Barriers to

## 2024-03-05 NOTE — DISCHARGE INSTRUCTIONS
INSTRUCTIONS FOR YOUR CARE AFTER SURGERY  You may remove the bandages 48 hours after your surgery.  You may shower any time. Do NOT take baths, use swimming pools, or use hot tubs for 2 weeks.  Keep your head and chest elevated 30 degrees when sleeping to avoid regurgitation.    USE A SIP TIMER AND TAKE A SMALL SIP OF PROTEIN WATER EVERY 10 TO 15 MINUTES DURING THE DAY.    Check your blood sugars at least twice a day.  If your blood sugars are consistently 180 or higher or less than 70, call our office ([phone]).      You may use ice packs on your incisions as needed for pain.    You may take Tylenol as needed for pain, in addition to the pain medication that has been prescribed for you. Follow the directions on the bottle. Do not consume more than 3,500 mg of acetaminophen (Tylenol) a day.      Follow your Bariatric CLEAR  Liquid Diet for 2 weeks. Use protein water or protein shots to supplement protein.  Avoid protein shakes for the first 2 weeks.  Try to get at least 64 ouncs of liquid in each day.    Do NOT lift anything more than 15 pounds (about a bag of groceries) or play contact sports for 2 weeks.   Do NOT drive if you are taking narcotic pain medication.  You may shower the day after discharge.  Replace band aids after showering  Keep your head elevated 30 degrees with sleep to avoid aspirating in your sleep.     DO NOT go to the emergency department first.  Call our office at any hour and the call will be directed to Dr. Chan.  Call if:  There is redness, swelling, or increasing pain in the wound.  There is fluid (pus) coming from the wound.    You have an oral temperature above 102° F (38.9° C).  You notice a bad smell coming from the wound or dressing.  The wounds break open.  You develop dizzy episodes or fainting while standing.  You develop a rash.  You have difficulty breathing.  You develop a reaction or have side effects to medicines you were given.  You have new pain in your calf

## 2024-03-05 NOTE — PROGRESS NOTES
2100 patient assessment completed. Patient c/o post surgical abdominal pain 7/10, does not want pain medicine at this time. Lap site dressings are dry and intact. BEATRIS is intact, small amount of bleeding noted from insertion site. BEATRIS bulb is compressed with 20 ml bloody drainage noted. IV fluids are infusing per order. SCD's are on. Assist patient up to the BR to void. Patient instructed on the plan of care to include NPO with allowance of 1 ice chip per hour. Patient verbalized understanding. Bed is low and locked and alarm is on. Call light is in reach. Patient instructed to call for any assistance.     2240 Patient medicated for pain per order.   
CLINICAL PHARMACY NOTE: MEDS TO BEDS    Total # of Prescriptions Filled: 5   The following medications were delivered to the patient:  Oxycodone 5 mg Tab  Ondansetron 4 mg Odt Tab  Gabapentin 300 mg Cap  Simethicone 80 mg Chew Tab  Acetaminophen 500 mg Tab    Additional Documentation:    
Patient A&Ox4. Complains of post op pain. Medication given per MAR. Skin intact. Band aids to lap sites CDI x5. SCD's on. Ambulated to restroom with stand by assist.1 ice chip/ hr given per order. Family at bedside. Denies other needs at this time. Call light in reach.   
ingredients well. May adjust the water and flavoring to taste. Decrease water for homemade protein shot. Increase water for homemade protein drink.     High Protein Jell-O    Dissolve sugar free Jell-O per package directions  After dissolving, cool for 3-5 minutes  While cooling, mix unflavored protein powder with 1 cup cold water, stirring to dissolve powder.  Stir protein and water into cooling Jell-O  Nikolasaye      [facility]  Phone: [phone]            Post-Op Weight Loss Surgery Zones        Everyday  Drink water - 64 oz or more a day.  Eat protein first every meal. Aim for goal of 60 grams or more protein daily.  Small bites, chew well, follow dietary guidelines.  Avoid sugar, sugar containing foods and beverages.  Avoid foods high in fat  Exercise regularly   GREEN ZONE ACTION   ALL CLEAR - This is your goal    Tracking all fluid and protein intake   Meeting fluid goal of 64 oz daily  Meeting protein goal of 60 grams or more daily  Nausea and vomiting adequately managed with anti-nausea medication  Compliant with post-op diet progression   Stay in the green!    Track fluids & protein to consistently meet goals  Keep all follow-up appointments  Follow post-op diet progression.  Regularly attend Support group meetings   YELLOW ZONE ACTION   CAUTION- This zone is a warning    If fluid intake is less than 40 oz daily.  Symptoms of dehydration:  Thirst   Headache  Dark urine  Hard stool  Lightheadedness  If you have nausea and/or vomiting not improving with anti-nausea medication.  Increased or unusual pain in your abdomen that does not get better with pain medication.  Temperature greater than 101.  If you can't swallow any fluids.   Thick, white, pus-like drainage from incision sites.  Calf or leg pain.   Call the office [phone]    Tips for managing nausea & vomiting:   Avoid liquids/foods high in sugar and/or fat   Eat slowly, take small bites, and chew food well.  Take

## 2024-03-06 ENCOUNTER — HOSPITAL ENCOUNTER (INPATIENT)
Age: 44
LOS: 2 days | Discharge: HOME | DRG: 252 | End: 2024-03-08
Attending: SURGERY | Admitting: SURGERY
Payer: COMMERCIAL

## 2024-03-06 ENCOUNTER — TELEPHONE (OUTPATIENT)
Dept: BARIATRICS/WEIGHT MGMT | Age: 44
End: 2024-03-06

## 2024-03-06 ENCOUNTER — TELEPHONE (OUTPATIENT)
Dept: FAMILY MEDICINE CLINIC | Age: 44
End: 2024-03-06

## 2024-03-06 DIAGNOSIS — E66.01 MORBID (SEVERE) OBESITY DUE TO EXCESS CALORIES (HCC): Primary | ICD-10-CM

## 2024-03-06 PROBLEM — R11.10 VOMITING: Status: ACTIVE | Noted: 2024-03-06

## 2024-03-06 PROBLEM — R11.2 NAUSEA AND VOMITING: Status: ACTIVE | Noted: 2024-03-06

## 2024-03-06 LAB
ABO + RH BLD: NORMAL
ANION GAP SERPL CALCULATED.3IONS-SCNC: 17 MEQ/L (ref 9–15)
APTT PPP: 24.7 SEC (ref 24.4–36.8)
BASOPHILS # BLD: 0 K/UL (ref 0–0.2)
BASOPHILS NFR BLD: 0.1 %
BLD GP AB SCN SERPL QL: NORMAL
BUN SERPL-MCNC: 7 MG/DL (ref 6–20)
CALCIUM SERPL-MCNC: 8.6 MG/DL (ref 8.5–9.9)
CHLORIDE SERPL-SCNC: 99 MEQ/L (ref 95–107)
CO2 SERPL-SCNC: 16 MEQ/L (ref 20–31)
CREAT SERPL-MCNC: 0.42 MG/DL (ref 0.5–0.9)
EOSINOPHIL # BLD: 0 K/UL (ref 0–0.7)
EOSINOPHIL NFR BLD: 0 %
ERYTHROCYTE [DISTWIDTH] IN BLOOD BY AUTOMATED COUNT: 12.9 % (ref 11.5–14.5)
GLUCOSE SERPL-MCNC: 118 MG/DL (ref 70–99)
HCT VFR BLD AUTO: 37.7 % (ref 37–47)
HGB BLD-MCNC: 12.6 G/DL (ref 12–16)
INR PPP: 1.1
LYMPHOCYTES # BLD: 1 K/UL (ref 1–4.8)
LYMPHOCYTES NFR BLD: 12.4 %
MAGNESIUM SERPL-MCNC: 1.9 MG/DL (ref 1.7–2.4)
MCH RBC QN AUTO: 29.2 PG (ref 27–31.3)
MCHC RBC AUTO-ENTMCNC: 33.4 % (ref 33–37)
MCV RBC AUTO: 87.5 FL (ref 79.4–94.8)
MONOCYTES # BLD: 0.7 K/UL (ref 0.2–0.8)
MONOCYTES NFR BLD: 8.9 %
NEUTROPHILS # BLD: 6.5 K/UL (ref 1.4–6.5)
NEUTS SEG NFR BLD: 78.4 %
PLATELET # BLD AUTO: 310 K/UL (ref 130–400)
POTASSIUM SERPL-SCNC: 3.4 MEQ/L (ref 3.4–4.9)
PROTHROMBIN TIME: 14.5 SEC (ref 12.3–14.9)
RBC # BLD AUTO: 4.31 M/UL (ref 4.2–5.4)
SODIUM SERPL-SCNC: 132 MEQ/L (ref 135–144)
WBC # BLD AUTO: 8.3 K/UL (ref 4.8–10.8)

## 2024-03-06 PROCEDURE — 85610 PROTHROMBIN TIME: CPT

## 2024-03-06 PROCEDURE — 85730 THROMBOPLASTIN TIME PARTIAL: CPT

## 2024-03-06 PROCEDURE — 80048 BASIC METABOLIC PNL TOTAL CA: CPT

## 2024-03-06 PROCEDURE — 86900 BLOOD TYPING SEROLOGIC ABO: CPT

## 2024-03-06 PROCEDURE — 96375 TX/PRO/DX INJ NEW DRUG ADDON: CPT

## 2024-03-06 PROCEDURE — 1210000000 HC MED SURG R&B

## 2024-03-06 PROCEDURE — 86850 RBC ANTIBODY SCREEN: CPT

## 2024-03-06 PROCEDURE — 2500000003 HC RX 250 WO HCPCS: Performed by: SURGERY

## 2024-03-06 PROCEDURE — G0378 HOSPITAL OBSERVATION PER HR: HCPCS

## 2024-03-06 PROCEDURE — 2580000003 HC RX 258: Performed by: SURGERY

## 2024-03-06 PROCEDURE — 36415 COLL VENOUS BLD VENIPUNCTURE: CPT

## 2024-03-06 PROCEDURE — 85025 COMPLETE CBC W/AUTO DIFF WBC: CPT

## 2024-03-06 PROCEDURE — 99024 POSTOP FOLLOW-UP VISIT: CPT | Performed by: SURGERY

## 2024-03-06 PROCEDURE — 83735 ASSAY OF MAGNESIUM: CPT

## 2024-03-06 PROCEDURE — 86901 BLOOD TYPING SEROLOGIC RH(D): CPT

## 2024-03-06 PROCEDURE — 6360000002 HC RX W HCPCS: Performed by: SURGERY

## 2024-03-06 RX ORDER — SODIUM CHLORIDE 0.9 % (FLUSH) 0.9 %
5-40 SYRINGE (ML) INJECTION PRN
Status: DISCONTINUED | OUTPATIENT
Start: 2024-03-06 | End: 2024-03-08 | Stop reason: HOSPADM

## 2024-03-06 RX ORDER — MORPHINE SULFATE 2 MG/ML
2 INJECTION, SOLUTION INTRAMUSCULAR; INTRAVENOUS EVERY 4 HOURS PRN
Status: DISCONTINUED | OUTPATIENT
Start: 2024-03-06 | End: 2024-03-07

## 2024-03-06 RX ORDER — ONDANSETRON 4 MG/1
4 TABLET, ORALLY DISINTEGRATING ORAL EVERY 8 HOURS PRN
Status: DISCONTINUED | OUTPATIENT
Start: 2024-03-06 | End: 2024-03-08 | Stop reason: HOSPADM

## 2024-03-06 RX ORDER — SODIUM CHLORIDE 9 MG/ML
INJECTION, SOLUTION INTRAVENOUS PRN
Status: DISCONTINUED | OUTPATIENT
Start: 2024-03-06 | End: 2024-03-08 | Stop reason: HOSPADM

## 2024-03-06 RX ORDER — ONDANSETRON 2 MG/ML
4 INJECTION INTRAMUSCULAR; INTRAVENOUS EVERY 6 HOURS PRN
Status: DISCONTINUED | OUTPATIENT
Start: 2024-03-06 | End: 2024-03-08 | Stop reason: HOSPADM

## 2024-03-06 RX ORDER — SODIUM CHLORIDE, SODIUM LACTATE, POTASSIUM CHLORIDE, CALCIUM CHLORIDE 600; 310; 30; 20 MG/100ML; MG/100ML; MG/100ML; MG/100ML
INJECTION, SOLUTION INTRAVENOUS CONTINUOUS
Status: DISCONTINUED | OUTPATIENT
Start: 2024-03-06 | End: 2024-03-06

## 2024-03-06 RX ORDER — HYDRALAZINE HYDROCHLORIDE 20 MG/ML
10 INJECTION INTRAMUSCULAR; INTRAVENOUS EVERY 6 HOURS PRN
Status: DISCONTINUED | OUTPATIENT
Start: 2024-03-06 | End: 2024-03-08 | Stop reason: HOSPADM

## 2024-03-06 RX ORDER — SODIUM CHLORIDE, SODIUM LACTATE, POTASSIUM CHLORIDE, CALCIUM CHLORIDE 600; 310; 30; 20 MG/100ML; MG/100ML; MG/100ML; MG/100ML
INJECTION, SOLUTION INTRAVENOUS CONTINUOUS
Status: DISCONTINUED | OUTPATIENT
Start: 2024-03-06 | End: 2024-03-08 | Stop reason: HOSPADM

## 2024-03-06 RX ORDER — MAGNESIUM SULFATE IN WATER 40 MG/ML
2000 INJECTION, SOLUTION INTRAVENOUS PRN
Status: DISCONTINUED | OUTPATIENT
Start: 2024-03-06 | End: 2024-03-08 | Stop reason: HOSPADM

## 2024-03-06 RX ORDER — SODIUM CHLORIDE 0.9 % (FLUSH) 0.9 %
5-40 SYRINGE (ML) INJECTION EVERY 12 HOURS SCHEDULED
Status: DISCONTINUED | OUTPATIENT
Start: 2024-03-06 | End: 2024-03-08 | Stop reason: HOSPADM

## 2024-03-06 RX ORDER — POTASSIUM CHLORIDE 7.45 MG/ML
10 INJECTION INTRAVENOUS PRN
Status: DISCONTINUED | OUTPATIENT
Start: 2024-03-06 | End: 2024-03-08 | Stop reason: HOSPADM

## 2024-03-06 RX ORDER — POTASSIUM CHLORIDE 20 MEQ/1
40 TABLET, EXTENDED RELEASE ORAL PRN
Status: DISCONTINUED | OUTPATIENT
Start: 2024-03-06 | End: 2024-03-08 | Stop reason: HOSPADM

## 2024-03-06 RX ORDER — PALONOSETRON 0.05 MG/ML
0.25 INJECTION, SOLUTION INTRAVENOUS ONCE
Status: COMPLETED | OUTPATIENT
Start: 2024-03-06 | End: 2024-03-06

## 2024-03-06 RX ADMIN — THIAMINE HYDROCHLORIDE: 100 INJECTION, SOLUTION INTRAMUSCULAR; INTRAVENOUS at 17:46

## 2024-03-06 RX ADMIN — MORPHINE SULFATE 2 MG: 2 INJECTION, SOLUTION INTRAMUSCULAR; INTRAVENOUS at 18:39

## 2024-03-06 RX ADMIN — METHYLPREDNISOLONE SODIUM SUCCINATE 40 MG: 40 INJECTION INTRAMUSCULAR; INTRAVENOUS at 18:39

## 2024-03-06 RX ADMIN — ONDANSETRON 4 MG: 2 INJECTION INTRAMUSCULAR; INTRAVENOUS at 21:54

## 2024-03-06 RX ADMIN — PALONOSETRON HYDROCHLORIDE 0.25 MG: 0.25 INJECTION INTRAVENOUS at 16:01

## 2024-03-06 RX ADMIN — SODIUM CHLORIDE, POTASSIUM CHLORIDE, SODIUM LACTATE AND CALCIUM CHLORIDE: 600; 310; 30; 20 INJECTION, SOLUTION INTRAVENOUS at 15:52

## 2024-03-06 ASSESSMENT — PAIN SCALES - GENERAL: PAINLEVEL_OUTOF10: 0

## 2024-03-06 NOTE — H&P
History and Physical    Patient Name:  :  Hospital Day:   Idalia Sarmiento 1980  LOS: 0 days      Date of Service: 3/6/2024    Subjective:      History of Present Illness:    Idalia Sarmiento  is a 43 y.o. female referred by Jl Chan,* for nausea and vomitng POD 2 from robotic gastric bypass.   Idalia was not tolerating liquids well this A.M. and so an attempt was made to have an outpatient IV infusion treatment with 2 liters of IV fluid and IV naloxi for nausea.  Orders were sent to pharmacy from our clinic and the patient arrived on [address], but the orders were never sent from Pharmacy.      Idalia subsequently had reported hematemsis x 4, and so was re-admitted.  She denies any NSAID use and was not on SQ heparin.  Abdominal pain has been incisional and moderate.    Past Medical History:   Diagnosis Date    Chiari I malformation (HCC)     Eczema     Migraines     PCO (polycystic ovaries)     S/P complete hysterectomy     Dr Dakotah Cervantes, atypical endocervical cells    Strain of musc/tend the rotator cuff of right shoulder, subs 2019    Type II diabetes mellitus, uncontrolled     Dr Benz    Past Surgical History:   Procedure Laterality Date    HYSTERECTOMY, TOTAL ABDOMINAL (CERVIX REMOVED)  2012    Dr Dakotah Cervantes, cervical atypia    LAPAROSCOPIC APPENDECTOMY      ARINA-EN-Y GASTRIC BYPASS N/A 3/4/2024    GASTRIC BYPASS ARINA-EN-Y LAPAROSCOPIC/ROBOTIC, modifier 22 performed by Jl Chan MD at [facility] [place]    UPPER GASTROINTESTINAL ENDOSCOPY N/A 2023    EGD BIOPSY performed by Jl Chan MD at [facility]        Family History   Problem Relation Age of Onset    Cancer Mother         ovarian? dec age 30    Cancer Father         dec age 60    Colon Cancer Neg Hx     Social History     Tobacco Use    Smoking status: Never     Passive exposure: Never    Smokeless tobacco: Never   Vaping Use    Vaping Use: Never used   Substance Use Topics    Alcohol

## 2024-03-06 NOTE — TELEPHONE ENCOUNTER
Call placed to patient. LVM requesting call back. Provided call back number.    
11:00 AM [facility] 1 [facility] Fac RAD   6/19/2024 10:45 AM Tor Waddell MD [facility]   .        Discussed setting-up hydration infusion today. Informed daughter that  office will call back with instructions. Discussed infusion is done over 2 hours and to expect to be in facility for approximately 3 hours.   Provided update to Dr Chan. Call placed to [facility] to arrange for bedded outpatient infusion.   Call placed to patient. Spoke with patient's daughter. Instructed her that room ready for outpatient infusion. Instructed to register at registration and then to [address] [place] [address]1. Informed daughter that room is ready and patient can come in right away. Daughter verbalized understanding.        Pati Duran RN

## 2024-03-06 NOTE — TELEPHONE ENCOUNTER
Care Transitions Initial Follow Up Call    Outreach made within 2 business days of discharge: Yes    Patient: Idalia Sarmiento Patient : 1980   MRN: [id number]  Reason for Admission: There are no discharge diagnoses documented for the most recent discharge.  Discharge Date: 3/5/24       Spoke with: HANS X1    Discharge department/facility: [facility]    TCM Interactive Patient Contact:    Scheduled appointment with PCP within 7-14 days    Follow Up  Future Appointments   Date Time Provider Department Center   3/8/2024  8:00 AM Ronen Chakraborty PA [facility]   3/15/2024 11:00 AM Jl Chan MD [facility]   3/15/2024 11:30 AM Joyce Gonsalez RD [facility]   3/26/2024  1:30 PM Dae Hendricks MD [facility]   4/3/2024  9:15 AM Manuel Moreno MD [facility]   4/3/2024 10:00 AM Cristiano Smith PSYD [facility]   4/3/2024 11:00 AM Jl Chan MD [facility]   4/3/2024 11:30 AM Joyce Gonsalez RD [facility]   2024  8:30 AM Ronen Chakraborty PA [facility]   2024 11:00 AM [facility] 1 [facility] Fac RAD   2024 10:45 AM Tor Waddell MD [facility]       Angelique Arellano, MA

## 2024-03-06 NOTE — CARE COORDINATION
Case Management Assessment  Initial Evaluation    Date/Time of Evaluation: 3/6/2024 3:33 PM  Assessment Completed by: Padmaja Kamara RN    If patient is discharged prior to next notation, then this note serves as note for discharge by case management.    Patient Name: Idalia Sarmiento                   YOB: 1980  Diagnosis: Nausea and vomiting [R11.2]  Vomiting [R11.10]                   Date / Time: 3/6/2024  1:34 PM    Patient Admission Status: Inpatient   Readmission Risk (Low < 19, Mod (19-27), High > 27): Readmission Risk Score: 4.9    Current PCP: Ronen Chakraborty PA  PCP verified by CM? Yes    Chart Reviewed: Yes      History Provided by: Patient  Patient Orientation: Alert and Oriented    Patient Cognition: Alert    Hospitalization in the last 30 days (Readmission):  Yes    If yes, Readmission Assessment in CM Navigator will be completed.    Advance Directives:      Code Status: Full Code   Patient's Primary Decision Maker is: Legal Next of Kin      Discharge Planning:    Patient lives with: Alone Type of Home: Apartment  Primary Care Giver: Self  Patient Support Systems include: Family Members   Current Financial resources:    Current community resources:    Current services prior to admission: None            Current DME:              Type of Home Care services:  None    ADLS  Prior functional level: Independent in ADLs/IADLs  Current functional level: Independent in ADLs/IADLs    PT AM-PAC:   /24  OT AM-PAC:   /24    Family can provide assistance at DC: Yes  Would you like Case Management to discuss the discharge plan with any other family members/significant others, and if so, who? No  Plans to Return to Present Housing: Yes  Other Identified Issues/Barriers to RETURNING to current housing: NA  Potential Assistance needed at discharge: N/A            Potential DME:    Patient expects to discharge to: Apartment  Plan for transportation at discharge:      Financial    Payor: [facility] /

## 2024-03-06 NOTE — TELEPHONE ENCOUNTER
Care Transitions Initial Follow Up Call    Outreach made within 2 business days of discharge: Yes    Patient: Idalia Sarmiento Patient : 1980   MRN: [id number]  Reason for Admission: There are no discharge diagnoses documented for the most recent discharge.  Discharge Date: 3/5/24       Spoke with: Isa    Discharge department/facility: [facility]    TCM Interactive Patient Contact:  Was patient able to fill all prescriptions: Yes  Was patient instructed to bring all medications to the follow-up visit: Yes  Is patient taking all medications as directed in the discharge summary? Yes  Does patient understand their discharge instructions: Yes  Does patient have questions or concerns that need addressed prior to 7-14 day follow up office visit: no    Scheduled appointment with PCP within 7-14 days    Follow Up  Future Appointments   Date Time Provider Department Center   3/8/2024  8:00 AM Ronen Chakraborty PA [facility]   3/15/2024 11:00 AM Jl Chan MD [facility]   3/15/2024 11:30 AM Joyce Gonsalez RD [facility]   3/26/2024  1:30 PM Dae Hendricks MD [facility]   4/3/2024  9:15 AM Manuel Moreno MD [facility]   4/3/2024 10:00 AM Cristiano Smith PSYD [facility]   4/3/2024 11:00 AM Jl Chan MD [facility]   4/3/2024 11:30 AM Joyce Gonsalez RD [facility]   2024  8:30 AM Ronen Chakraborty PA [facility]   2024 11:00 AM [facility] Fac RAD   2024 10:45 AM Tor Waddell MD [facility]       Angelique Arellano MA

## 2024-03-06 NOTE — PROGRESS NOTES
Patient arrived to unit, oriented to room. Aloxi and LR per Emar. C/o 'feeling like I need to throw up' Emesis basin within reach of patient. No needs at this time.       1900: Patient vomited bloody emesis approximately 8 more times after being given aloxi. Did not voice relief from such.

## 2024-03-07 PROBLEM — R42 LIGHTHEADED: Status: ACTIVE | Noted: 2024-03-07

## 2024-03-07 LAB
ANION GAP SERPL CALCULATED.3IONS-SCNC: 17 MEQ/L (ref 9–15)
BUN SERPL-MCNC: 10 MG/DL (ref 6–20)
CALCIUM SERPL-MCNC: 8.2 MG/DL (ref 8.5–9.9)
CHLORIDE SERPL-SCNC: 103 MEQ/L (ref 95–107)
CO2 SERPL-SCNC: 16 MEQ/L (ref 20–31)
CREAT SERPL-MCNC: 0.47 MG/DL (ref 0.5–0.9)
ERYTHROCYTE [DISTWIDTH] IN BLOOD BY AUTOMATED COUNT: 13.2 % (ref 11.5–14.5)
GLUCOSE SERPL-MCNC: 139 MG/DL (ref 70–99)
HCT VFR BLD AUTO: 33.2 % (ref 37–47)
HCT VFR BLD AUTO: 36 % (ref 37–47)
HCT VFR BLD AUTO: 38.1 % (ref 37–47)
HGB BLD-MCNC: 11.3 G/DL (ref 12–16)
HGB BLD-MCNC: 12.1 G/DL (ref 12–16)
HGB BLD-MCNC: 12.9 G/DL (ref 12–16)
MCH RBC QN AUTO: 29.3 PG (ref 27–31.3)
MCHC RBC AUTO-ENTMCNC: 33.9 % (ref 33–37)
MCV RBC AUTO: 86.4 FL (ref 79.4–94.8)
PLATELET # BLD AUTO: 336 K/UL (ref 130–400)
POTASSIUM SERPL-SCNC: 3.3 MEQ/L (ref 3.4–4.9)
RBC # BLD AUTO: 4.41 M/UL (ref 4.2–5.4)
SODIUM SERPL-SCNC: 136 MEQ/L (ref 135–144)
WBC # BLD AUTO: 7.1 K/UL (ref 4.8–10.8)

## 2024-03-07 PROCEDURE — 85018 HEMOGLOBIN: CPT

## 2024-03-07 PROCEDURE — G0378 HOSPITAL OBSERVATION PER HR: HCPCS

## 2024-03-07 PROCEDURE — 96365 THER/PROPH/DIAG IV INF INIT: CPT

## 2024-03-07 PROCEDURE — 1210000000 HC MED SURG R&B

## 2024-03-07 PROCEDURE — 6360000002 HC RX W HCPCS: Performed by: SURGERY

## 2024-03-07 PROCEDURE — 85027 COMPLETE CBC AUTOMATED: CPT

## 2024-03-07 PROCEDURE — 80048 BASIC METABOLIC PNL TOTAL CA: CPT

## 2024-03-07 PROCEDURE — 99222 1ST HOSP IP/OBS MODERATE 55: CPT | Performed by: PSYCHIATRY & NEUROLOGY

## 2024-03-07 PROCEDURE — 6360000002 HC RX W HCPCS: Performed by: INTERNAL MEDICINE

## 2024-03-07 PROCEDURE — A4216 STERILE WATER/SALINE, 10 ML: HCPCS | Performed by: SURGERY

## 2024-03-07 PROCEDURE — 96375 TX/PRO/DX INJ NEW DRUG ADDON: CPT

## 2024-03-07 PROCEDURE — 97166 OT EVAL MOD COMPLEX 45 MIN: CPT

## 2024-03-07 PROCEDURE — 97162 PT EVAL MOD COMPLEX 30 MIN: CPT

## 2024-03-07 PROCEDURE — 6370000000 HC RX 637 (ALT 250 FOR IP): Performed by: INTERNAL MEDICINE

## 2024-03-07 PROCEDURE — 6370000000 HC RX 637 (ALT 250 FOR IP): Performed by: SURGERY

## 2024-03-07 PROCEDURE — APPSS45 APP SPLIT SHARED TIME 31-45 MINUTES: Performed by: NURSE PRACTITIONER

## 2024-03-07 PROCEDURE — 99024 POSTOP FOLLOW-UP VISIT: CPT | Performed by: SURGERY

## 2024-03-07 PROCEDURE — C9113 INJ PANTOPRAZOLE SODIUM, VIA: HCPCS | Performed by: SURGERY

## 2024-03-07 PROCEDURE — 2500000003 HC RX 250 WO HCPCS: Performed by: SURGERY

## 2024-03-07 PROCEDURE — 2580000003 HC RX 258: Performed by: SURGERY

## 2024-03-07 PROCEDURE — 36415 COLL VENOUS BLD VENIPUNCTURE: CPT

## 2024-03-07 PROCEDURE — 85014 HEMATOCRIT: CPT

## 2024-03-07 PROCEDURE — 96376 TX/PRO/DX INJ SAME DRUG ADON: CPT

## 2024-03-07 RX ORDER — ACETAMINOPHEN 160 MG/5ML
650 LIQUID ORAL EVERY 4 HOURS PRN
Status: DISCONTINUED | OUTPATIENT
Start: 2024-03-07 | End: 2024-03-08 | Stop reason: HOSPADM

## 2024-03-07 RX ORDER — MECLIZINE HYDROCHLORIDE 25 MG/1
25 TABLET ORAL 3 TIMES DAILY
Status: DISCONTINUED | OUTPATIENT
Start: 2024-03-07 | End: 2024-03-08 | Stop reason: HOSPADM

## 2024-03-07 RX ORDER — POTASSIUM CHLORIDE 7.45 MG/ML
10 INJECTION INTRAVENOUS
Status: COMPLETED | OUTPATIENT
Start: 2024-03-07 | End: 2024-03-07

## 2024-03-07 RX ORDER — TRANEXAMIC ACID 100 MG/ML
15 INJECTION, SOLUTION INTRAVENOUS ONCE
Status: COMPLETED | OUTPATIENT
Start: 2024-03-07 | End: 2024-03-07

## 2024-03-07 RX ADMIN — POTASSIUM CHLORIDE 10 MEQ: 7.46 INJECTION, SOLUTION INTRAVENOUS at 10:59

## 2024-03-07 RX ADMIN — Medication 10 ML: at 08:22

## 2024-03-07 RX ADMIN — MECLIZINE HYDROCHLORIDE 25 MG: 25 TABLET ORAL at 16:32

## 2024-03-07 RX ADMIN — MECLIZINE HYDROCHLORIDE 25 MG: 25 TABLET ORAL at 20:59

## 2024-03-07 RX ADMIN — MECLIZINE HYDROCHLORIDE 25 MG: 25 TABLET ORAL at 09:26

## 2024-03-07 RX ADMIN — SODIUM CHLORIDE, POTASSIUM CHLORIDE, SODIUM LACTATE AND CALCIUM CHLORIDE: 600; 310; 30; 20 INJECTION, SOLUTION INTRAVENOUS at 21:00

## 2024-03-07 RX ADMIN — POTASSIUM CHLORIDE 10 MEQ: 7.46 INJECTION, SOLUTION INTRAVENOUS at 09:16

## 2024-03-07 RX ADMIN — SODIUM CHLORIDE, POTASSIUM CHLORIDE, SODIUM LACTATE AND CALCIUM CHLORIDE: 600; 310; 30; 20 INJECTION, SOLUTION INTRAVENOUS at 02:39

## 2024-03-07 RX ADMIN — POTASSIUM CHLORIDE 10 MEQ: 7.46 INJECTION, SOLUTION INTRAVENOUS at 10:00

## 2024-03-07 RX ADMIN — SODIUM CHLORIDE, POTASSIUM CHLORIDE, SODIUM LACTATE AND CALCIUM CHLORIDE: 600; 310; 30; 20 INJECTION, SOLUTION INTRAVENOUS at 10:58

## 2024-03-07 RX ADMIN — SODIUM CHLORIDE, PRESERVATIVE FREE 40 MG: 5 INJECTION INTRAVENOUS at 12:01

## 2024-03-07 RX ADMIN — TRANEXAMIC ACID 1413 MG: 100 INJECTION, SOLUTION INTRAVENOUS at 13:55

## 2024-03-07 RX ADMIN — ACETAMINOPHEN 650 MG: 650 SOLUTION ORAL at 11:03

## 2024-03-07 RX ADMIN — POTASSIUM CHLORIDE 10 MEQ: 7.46 INJECTION, SOLUTION INTRAVENOUS at 09:26

## 2024-03-07 RX ADMIN — MORPHINE SULFATE 2 MG: 2 INJECTION, SOLUTION INTRAMUSCULAR; INTRAVENOUS at 01:27

## 2024-03-07 RX ADMIN — MORPHINE SULFATE 2 MG: 2 INJECTION, SOLUTION INTRAMUSCULAR; INTRAVENOUS at 06:36

## 2024-03-07 ASSESSMENT — PAIN SCALES - GENERAL
PAINLEVEL_OUTOF10: 7

## 2024-03-07 ASSESSMENT — PAIN DESCRIPTION - LOCATION: LOCATION: ABDOMEN

## 2024-03-07 ASSESSMENT — ENCOUNTER SYMPTOMS
COLOR CHANGE: 0
CHEST TIGHTNESS: 0
WHEEZING: 0
SHORTNESS OF BREATH: 0
VOMITING: 1
NAUSEA: 1
COUGH: 0
TROUBLE SWALLOWING: 0

## 2024-03-07 ASSESSMENT — PAIN SCALES - WONG BAKER: WONGBAKER_NUMERICALRESPONSE: 0

## 2024-03-07 NOTE — PROGRESS NOTES
Physical Therapy Med Surg Initial Assessment  Facility/Department: [address] MED SURG UNIT  Room: [place] [address]       NAME: Idalia Sarmiento  : 1980 (43 y.o.)  MRN: [id number]  CODE STATUS: Full Code    Date of Service: 3/7/2024    Patient Diagnosis(es): Nausea and vomiting [R11.2]  Vomiting [R11.10]   No chief complaint on file.    Patient Active Problem List    Diagnosis Date Noted    Lightheaded 2024    Nausea and vomiting 2024    Vomiting 2024    Severe obesity (BMI 35.0-39.9) with comorbidity (HCC) 2024    Morbid (severe) obesity due to excess calories (HCC) 2024    Other abnormal glucose 2024    Polycystic ovarian syndrome 2024    Obesity (BMI 30-39.9) 2024    KORI (obstructive sleep apnea) 2023    Gastroesophageal reflux disease 2023    Acute pain of right knee 2019    Abnormal glucose 2019    Chiari I malformation (HCC) 2018    Class 2 severe obesity due to excess calories with serious comorbidity and body mass index (BMI) of 39.0 to 39.9 in adult (HCC) 2017    Atypical endocervical cells on Pap smear     Migraines     PCO (polycystic ovaries)     Eczema         Past Medical History:   Diagnosis Date    Chiari I malformation (HCC)     Eczema     Migraines     PCO (polycystic ovaries)     S/P complete hysterectomy     Dr Dakotah Cervantes, atypical endocervical cells    Strain of musc/tend the rotator cuff of right shoulder, subs 2019    Type II diabetes mellitus, uncontrolled     Dr Benz     Past Surgical History:   Procedure Laterality Date    HYSTERECTOMY, TOTAL ABDOMINAL (CERVIX REMOVED)  2012    Dr Dakotah Cervantes, cervical atypia    LAPAROSCOPIC APPENDECTOMY      ARINA-EN-Y GASTRIC BYPASS N/A 3/4/2024    GASTRIC BYPASS ARINA-EN-Y LAPAROSCOPIC/ROBOTIC, modifier 22 performed [place] Jl Chan [place] [facility] [place]    UPPER GASTROINTESTINAL ENDOSCOPY N/A 2023    EGD BIOPSY performed by Jl Chan MD

## 2024-03-07 NOTE — PROGRESS NOTES
0830-pt states she feels like she is going to pass out. Dr Chan updated.     0900-Labs drawn, orthostatic BP obtained    1300-pt had lg red bowel movement, Dr Chan updated.     1355-TXA given via IV

## 2024-03-07 NOTE — PROGRESS NOTES
Shift assessment complete, see flowsheets. Pt A&Ox4, meds given per mar, no other needs verbalized at this time, call light within reach.    Electronically signed by David Lake RN on 3/6/2024 at 10:29 PM

## 2024-03-07 NOTE — PROGRESS NOTES
[facility] Bariatric and Metabolic Surgery    Patient Name:  :  Hospital Day:   Idalia Sarmiento 1980  LOS: 1 day      Date of Service: 3/7/2024    Subjective:      History of Present Illness:    Idalia Sarmiento  is a 43 y.o. female post op day 3 from robotic Geovanna en Y gastric bypass.  Idalia was re-admitted yesterday for nausea and emesis. Her daughter reported that she seems to be very sensitive to narcotics and the gabapentin she received post-op, and that certainly seems to be true.  Idalia received 2 mg of IV morphine this morning and reported that she \"felt like she would pass out\" with a BP of 141/80 and HR 99.  Her Hg/Hct are stable at 12.9/38.1%.      Idalia was having emesis of brown, thin, old-blood appearing liquid yesterday, but has not had any today.  She received IV solumedrol yesterday to decrease post-operative anastomotic swelling.  She still c/o nausea.       Objective:      Vital Signs:  BP (!) 141/92   Pulse 99   Temp 98.6 °F (37 °C) (Oral)   Resp 19   Ht 1.575 m (5' 2\")   Wt 94.2 kg (207 lb 10.8 oz)   LMP  (LMP Unknown) Comment:   SpO2 96%   BMI 37.98 kg/m²     Intake/Output Summary (Last 24 hours) at 3/7/2024 1011  Last data filed at 3/7/2024 0548  Gross per 24 hour   Intake 2552.41 ml   Output --   Net 2552.41 ml       Physical Exam  General: No acute distress  HEENT: P PERRLA, no scleral icterus.  Trachea midline.  Thoracic: Clear to auscultation bilaterally.  Cardiac: Regular rate and rhythm with no rubs clicks or murmurs.  Abdomen: Bandages dry.  Abdomen non-acute    Extremities: No clubbing cyanosis or edema.  Neurologic: No gross motor or sensory defects.    Labs Reviewed:  Lab Results   Component Value Date/Time     2024 05:47 AM    K 3.3 2024 05:47 AM    K 3.4 2024 04:30 PM     2024 05:47 AM    CO2 16 2024 05:47 AM    LABCREA 273.4 2023 07:46 AM    AST 14 2023 03:30 PM    ALT 17 2023 03:30 PM     TSH 1.070 04/09/2013 11:26 AM     Lab Results   Component Value Date    HGB 12.9 03/07/2024    HCT 38.1 03/07/2024     Lab Results   Component Value Date/Time    INR 1.1 03/06/2024 04:29 PM          Diagnosis:      Nausea  Assessment/Plan:            Idalia Sarmiento  is post op day 3 from robotic gastric bypass with nausea and hypersensitivity to narcotics and gabapentin.  We are replacing her potassium.  I have requested a consult from the Hospitalist to assess for other contributing factors to her dysphoria and nausea.       Jl Chan MD, FASMBS, FACS, MBSAQIP Verified Surgeon  [id number], Diplomate of The American Board of Surgery

## 2024-03-07 NOTE — PLAN OF CARE
See OT evaluation for all goals and OT POC. Electronically signed by Charlene Wood OTR/L on 3/7/2024 at 12:03 PM

## 2024-03-07 NOTE — PROGRESS NOTES
[facility] OCCUPATIONAL THERAPY EVALUATION - ACUTE     NAME: Idalia Sarmiento  : 1980 (43 y.o.)  MRN: [id number]  CODE STATUS: Full Code  Room: [place] [id number]    Date of Service: 3/7/2024    Patient Diagnosis(es): Nausea and vomiting [R11.2]  Vomiting [R11.10]   Patient Active Problem List    Diagnosis Date Noted    Nausea and vomiting 2024    Vomiting 2024    Severe obesity (BMI 35.0-39.9) with comorbidity (HCC) 2024    Morbid (severe) obesity due to excess calories (HCC) 2024    Other abnormal glucose 2024    Polycystic ovarian syndrome 2024    Obesity (BMI 30-39.9) 2024    KORI (obstructive sleep apnea) 2023    Gastroesophageal reflux disease 2023    Acute pain of right knee 2019    Abnormal glucose 2019    Chiari I malformation (HCC) 2018    Class 2 severe obesity due to excess calories with serious comorbidity and body mass index (BMI) of 39.0 to 39.9 in adult (HCC) 2017    Atypical endocervical cells on Pap smear     Migraines     PCO (polycystic ovaries)     Eczema         Past Medical History:   Diagnosis Date    Chiari I malformation ([facility])     Eczema     Migraines     PCO (polycystic ovaries)     S/P complete hysterectomy     Dr Dakotah Cervantes, atypical endocervical cells    Strain of musc/tend the rotator cuff of right shoulder, subs 2019    Type II diabetes mellitus, uncontrolled     Dr Benz     Past Surgical History:   Procedure Laterality Date    HYSTERECTOMY, TOTAL ABDOMINAL (CERVIX REMOVED)  2012    Dr Dakotah Cervantes, cervical atypia    LAPAROSCOPIC APPENDECTOMY      ARINA-EN-Y GASTRIC BYPASS N/A 3/4/2024    GASTRIC BYPASS ARINA-EN-Y LAPAROSCOPIC/ROBOTIC, modifier 22 performed by Jl Chan MD at [facility] [place]    UPPER GASTROINTESTINAL ENDOSCOPY N/A 2023    EGD BIOPSY performed by Jl Chan MD at [facility]        Restrictions  Restrictions/Precautions: Up as Tolerated     Safety

## 2024-03-07 NOTE — CONSULTS
previous visit.      Carotid duplex: No results found for this or any previous visit.  No results found for this or any previous visit.  No results found for this or any previous visit.      Echo No results found for this or any previous visit.            Assessment/Plan:    Patient is a 43-year-old female with past medical history of migraine headache, Chiari I malformation, diabetes mellitus type 2, PCO, eczema, morbid obesity, KORI who underwent Geovanna-en-Y gastric bypass surgery on 3/4/2024 and is currently postop day #3.  Patient was sent home postoperatively and admitted back to the hospital on 3/6/2024 due to ongoing nausea and vomiting.  Patient was admitted for IV hydration.  Hemoglobin room in stable at 12.9.  No leukocytosis.  Potassium low today at 3.3.  Patient has remained afebrile.  Blood pressure stable yet mildly hypertensive at times.  Patient received 2 mg of IV morphine 3 times since 1830 last evening.  Was started on meclizine 25 mg 3 times daily this morning and has received 1 dose thus far.  Also given 40 mg of IV methylprednisolone at 1830 yesterday.    Patient had 1 episode of brief lightheadedness when getting up to go to the bathroom.  Felt as if she was going to pass out.  No vertiginous symptoms.  No recurrent episodes.  Has had ongoing nausea and vomiting and reports of melena and hematemesis.  Hemoglobin remains stable.  Baseline blood pressure stable.  Will assess orthostatic blood pressures.  At this time cerebellar testing is intact on exam.  No otalgia, tinnitus, hearing changes.  No chest pain pressure palpitations.  Mildly tachycardic.  Patient's lightheadedness is likely secondary to dehydration and hemodynamic changes secondary to nausea, vomiting, opioids, recent surgery.  Will follow and if further concern arises obtain further workup/imaging.      Collaborating physicians: Dr Gloria    3/7/2024:    Patient seen and examined by myself pertinent portions of the physical  examination as well as complete history and neurological examination were completed in its entirety.  At this time no focal or lateralizing signs are seen no significant weakness no dysarthria no aphasia and no cerebellar signs are noted.  Patient may be suffering from toxic versus metabolic protuberances secondary to medications likely also orthostasis secondary to dehydration.  We will see how patient responds to fluid rehydration and discontinuation of pain medications.    Electronically signed by CORNELIO Reyna CNP on 3/7/2024 at 12:20 PM

## 2024-03-07 NOTE — CONSULTS
Dr. Chan from surgical team consult me for dizziness with normal blood pressure.  Patient had postop day #3 robotic gastric bypass done.  Admitted under surgical service.  Patient [place] any chest pain shortness of breath denies nausea vomiting abdominal pain.  Patient's dizziness started this morning.  Worsened with moving her head around.  No history of similar chief complaint.  No weakness in the upper and lower extremity.  No facial droop or slurring of speech.  No blurry vision or headaches.      Past Medical History:   Diagnosis Date    Chiari I malformation (HCC)     Eczema     Migraines 2010    PCO (polycystic ovaries)     S/P complete hysterectomy 2012    Dr Dakotah Cervantes, atypical endocervical cells    Strain of musc/tend the rotator cuff of right shoulder, subs 12/12/2019    Type II diabetes mellitus, uncontrolled     Dr Benz     Past Surgical History:   Procedure Laterality Date    HYSTERECTOMY, TOTAL ABDOMINAL (CERVIX REMOVED)  01/01/2012    Dr Dakotah Cervantes, cervical atypia    LAPAROSCOPIC APPENDECTOMY      ARINA-EN-Y GASTRIC BYPASS N/A 3/4/2024    GASTRIC BYPASS ARINA-EN-Y LAPAROSCOPIC/ROBOTIC, modifier 22 performed by Jl Chan MD at [facility] [place]    UPPER GASTROINTESTINAL ENDOSCOPY N/A 06/14/2023    EGD BIOPSY performed by Jl Chan MD at [facility]     Social History       Tobacco History       Smoking Status  Never      Passive Exposure  Never      Smokeless Tobacco Use  Never              Alcohol History       Alcohol Use Status  No              Drug Use       Drug Use Status  No              Sexual Activity       Sexually Active  Yes                  Family History   Problem Relation Age of Onset    Cancer Mother         ovarian? dec age 30    Cancer Father         dec age 60    Colon Cancer Neg Hx      No current facility-administered medications on file prior to encounter.     Current Outpatient Medications on File Prior to Encounter   Medication Sig Dispense Refill

## 2024-03-08 VITALS
TEMPERATURE: 98.2 F | HEART RATE: 94 BPM | HEIGHT: 62 IN | WEIGHT: 207.8 LBS | BODY MASS INDEX: 38.24 KG/M2 | DIASTOLIC BLOOD PRESSURE: 77 MMHG | RESPIRATION RATE: 20 BRPM | SYSTOLIC BLOOD PRESSURE: 116 MMHG | OXYGEN SATURATION: 99 %

## 2024-03-08 LAB
HCT VFR BLD AUTO: 30.5 % (ref 37–47)
HCT VFR BLD AUTO: 31.2 % (ref 37–47)
HGB BLD-MCNC: 10.3 G/DL (ref 12–16)
HGB BLD-MCNC: 10.6 G/DL (ref 12–16)

## 2024-03-08 PROCEDURE — C9113 INJ PANTOPRAZOLE SODIUM, VIA: HCPCS | Performed by: SURGERY

## 2024-03-08 PROCEDURE — 6370000000 HC RX 637 (ALT 250 FOR IP): Performed by: INTERNAL MEDICINE

## 2024-03-08 PROCEDURE — 99232 SBSQ HOSP IP/OBS MODERATE 35: CPT | Performed by: NURSE PRACTITIONER

## 2024-03-08 PROCEDURE — 96376 TX/PRO/DX INJ SAME DRUG ADON: CPT

## 2024-03-08 PROCEDURE — G0378 HOSPITAL OBSERVATION PER HR: HCPCS

## 2024-03-08 PROCEDURE — 36415 COLL VENOUS BLD VENIPUNCTURE: CPT

## 2024-03-08 PROCEDURE — 85014 HEMATOCRIT: CPT

## 2024-03-08 PROCEDURE — 6360000002 HC RX W HCPCS: Performed by: SURGERY

## 2024-03-08 PROCEDURE — 2580000003 HC RX 258: Performed by: SURGERY

## 2024-03-08 PROCEDURE — 85018 HEMOGLOBIN: CPT

## 2024-03-08 PROCEDURE — 97116 GAIT TRAINING THERAPY: CPT

## 2024-03-08 RX ORDER — ONDANSETRON 4 MG/1
4 TABLET, ORALLY DISINTEGRATING ORAL EVERY 8 HOURS PRN
Qty: 30 TABLET | Refills: 1 | Status: SHIPPED | OUTPATIENT
Start: 2024-03-08

## 2024-03-08 RX ORDER — ASCORBIC ACID 500 MG
500 TABLET ORAL 2 TIMES DAILY
Qty: 30 TABLET | Refills: 3 | Status: SHIPPED | OUTPATIENT
Start: 2024-03-08

## 2024-03-08 RX ORDER — ACETAMINOPHEN 500 MG
500 TABLET ORAL 4 TIMES DAILY PRN
Qty: 120 TABLET | Refills: 0 | Status: SHIPPED | OUTPATIENT
Start: 2024-03-08

## 2024-03-08 RX ORDER — OMEPRAZOLE 20 MG/1
20 CAPSULE, DELAYED RELEASE ORAL
Qty: 30 CAPSULE | Refills: 5 | Status: SHIPPED | OUTPATIENT
Start: 2024-03-08

## 2024-03-08 RX ORDER — POTASSIUM CHLORIDE 20 MEQ/1
40 TABLET, EXTENDED RELEASE ORAL DAILY
Qty: 10 TABLET | Refills: 0 | Status: SHIPPED | OUTPATIENT
Start: 2024-03-08 | End: 2024-03-13

## 2024-03-08 RX ORDER — ONDANSETRON 4 MG/1
4 TABLET, ORALLY DISINTEGRATING ORAL 3 TIMES DAILY PRN
Qty: 21 TABLET | Refills: 0 | Status: SHIPPED | OUTPATIENT
Start: 2024-03-08

## 2024-03-08 RX ORDER — LANOLIN ALCOHOL/MO/W.PET/CERES
325 CREAM (GRAM) TOPICAL 2 TIMES DAILY
Qty: 90 TABLET | Refills: 3 | Status: SHIPPED | OUTPATIENT
Start: 2024-03-08

## 2024-03-08 RX ORDER — MECLIZINE HYDROCHLORIDE 25 MG/1
25 TABLET ORAL 3 TIMES DAILY
Qty: 30 TABLET | Refills: 0 | Status: SHIPPED | OUTPATIENT
Start: 2024-03-08 | End: 2024-03-18

## 2024-03-08 RX ADMIN — SODIUM CHLORIDE, POTASSIUM CHLORIDE, SODIUM LACTATE AND CALCIUM CHLORIDE: 600; 310; 30; 20 INJECTION, SOLUTION INTRAVENOUS at 07:38

## 2024-03-08 RX ADMIN — MECLIZINE HYDROCHLORIDE 25 MG: 25 TABLET ORAL at 07:33

## 2024-03-08 RX ADMIN — SODIUM CHLORIDE, PRESERVATIVE FREE 40 MG: 5 INJECTION INTRAVENOUS at 07:34

## 2024-03-08 RX ADMIN — Medication 10 ML: at 07:38

## 2024-03-08 ASSESSMENT — ENCOUNTER SYMPTOMS
NAUSEA: 0
SHORTNESS OF BREATH: 0
COUGH: 0
ABDOMINAL DISTENTION: 0
WHEEZING: 0
TROUBLE SWALLOWING: 0
COLOR CHANGE: 0
ABDOMINAL PAIN: 0
DIARRHEA: 0
CHEST TIGHTNESS: 0
VOMITING: 0

## 2024-03-08 ASSESSMENT — PAIN SCALES - GENERAL: PAINLEVEL_OUTOF10: 0

## 2024-03-08 NOTE — FLOWSHEET NOTE
1400: Patient arrived to unit for outpatient IV fluids, perfect serve to Dr Chan to obtain orders.   1430: Patient Hematemesis times 4, Perfect serve sent to Dr Chan, notification given to [facility], still awaiting orders, IV access obtained to [facility].   1500: Orders obtained for  ml/hr, spoke with Dr Chan Care coordinator, will admit patient and send patient to [facility].   1600: Patient transferred to [facility] report given to [place]. .Electronically signed by Ludivina Gan RN on 3/6/2024 at 4:10 PM    
Patient is resting in bed and her several visitors are at bedside,her respirations were effortless,she wanted to take a shower but she was made aware that an order is needed.    20:24 a permission from Dr. Chan was granted for patient to take a shower.    21:30 patient have finished her shower,complete new bed linens was provided,she voiced her appreciation for the care that she received this evening.    21.45 new abdominal incisions dressing were applied,her incisions were all approximated with two staples each.    03:05 she is sleeping,C-pap on and sinus rhythm.  
Breath sounds clear and equal bilaterally.

## 2024-03-08 NOTE — PROGRESS NOTES
Pt discharge reviewed pt verbalized understanding.  Pt is aox4 pwd even unlabored respirations.  Pt is waiting for her ride to come pick her up.

## 2024-03-08 NOTE — PROGRESS NOTES
Progress Note  Date:3/8/2024       [facility]  Patient Name:Idalia Sarmiento     YOB: 1980     Age:43 y.o.        Subjective    Subjective:  Symptoms:  She reports malaise and weakness.  No shortness of breath, cough, chest pain, headache, chest pressure, anorexia, diarrhea or anxiety.    Diet:  No nausea or vomiting.       Review of Systems   Respiratory:  Negative for cough and shortness of breath.    Cardiovascular:  Negative for chest pain.   Gastrointestinal:  Negative for anorexia, diarrhea, nausea and vomiting.   Neurological:  Positive for weakness.     Objective         Vitals Last 24 Hours:  TEMPERATURE:  Temp  Av.3 °F (36.8 °C)  Min: 98.1 °F (36.7 °C)  Max: 98.6 °F (37 °C)  RESPIRATIONS RANGE: Resp  Av.5  Min: 18  Max: 20  PULSE OXIMETRY RANGE: SpO2  Av.6 %  Min: 97 %  Max: 100 %  PULSE RANGE: Pulse  Av.7  Min: 82  Max: 101  BLOOD PRESSURE RANGE: Systolic (24hrs), Av , Min:105 , Max:130   ; Diastolic (24hrs), Av, Min:62, Max:81    I/O (24Hr):  No intake or output data in the 24 hours ending 24 1155  Objective:  General Appearance:  Comfortable, well-appearing and in no acute distress.    Vital signs: (most recent): Blood pressure 122/70, pulse 82, temperature 98.6 °F (37 °C), temperature source Oral, resp. rate 20, height 1.575 m (5' 2\"), weight 94.3 kg (207 lb 12.8 oz), SpO2 100 %, not currently breastfeeding.    HEENT: Normal HEENT exam.    Lungs:  She is not in respiratory distress.  No stridor.  No decreased breath sounds or wheezes.    Heart: S1 normal and S2 normal.    Abdomen: Abdomen is soft.  Bowel sounds are normal.   There is no epigastric area or suprapubic area tenderness.     Pulses: Distal pulses are intact.    Neurological: Patient is alert and oriented to person, place and time.    Pupils:  Pupils are equal, round, and reactive to light.    Skin:  Warm.      Labs/Imaging/Diagnostics    Labs:  CBC:  Recent Labs     24  1629  03/07/24  0855 03/07/24  1354 03/07/24 2008 03/08/24  0809   WBC 8.3 7.1  --   --   --    RBC 4.31 4.41  --   --   --    HGB 12.6 12.9 12.1 11.3* 10.3*   HCT 37.7 38.1 36.0* 33.2* 30.5*   MCV 87.5 86.4  --   --   --    RDW 12.9 13.2  --   --   --     336  --   --   --      CHEMISTRIES:  Recent Labs     03/06/24  1630 03/07/24  0547   * 136   K 3.4 3.3*   CL 99 103   CO2 16* 16*   BUN 7 10   CREATININE 0.42* 0.47*   GLUCOSE 118* 139*   MG 1.9  --      PT/INR:  Recent Labs     03/06/24  1629   PROTIME 14.5   INR 1.1     APTT:  Recent Labs     03/06/24  1629   APTT 24.7     LIVER PROFILE:No results for input(s): \"AST\", \"ALT\", \"BILIDIR\", \"BILITOT\", \"ALKPHOS\" in the last 72 hours.    Imaging Last 24 Hours:  No results found.  Assessment//Plan           Hospital Problems             Last Modified POA    * (Principal) Nausea and vomiting 3/6/2024 Yes    Vomiting 3/6/2024 Yes    Lightheaded 3/7/2024 Yes   Dizziness  Encounter for post surgical care  Assessment & Plan  3/8: dizziness is better, c/w meclizine, FU neurology, epley maneuver was negative, TCT 35 min  Electronically signed by Rosales Townsend MD on 3/8/24 at 11:55 AM EST

## 2024-03-08 NOTE — CARE COORDINATION
MET W/PT TO ASSESS NEEDS AND DISCUSS DISCHARGE PLAN WHICH IS HOME ALONE. DTRS ARE SUPPORTIVE. DISCUSSED PT RECOMMENDATION OF SNF VS HHC. PT DECLINES BOTH. DENIES NEEDS. SAID DTRS ARE SUPPORTIVE AND WILL ASSIST W/NEEDS. FEELING BETTER. RESTING QUIETLY IN BED. ON RA. IVF INFUSING AS DIRECTED.

## 2024-03-08 NOTE — PLAN OF CARE
Problem: Safety - Adult  Goal: Free from fall injury  Outcome: Progressing     Problem: Chronic Conditions and Co-morbidities  Goal: Patient's chronic conditions and co-morbidity symptoms are monitored and maintained or improved  Outcome: Progressing     Problem: Discharge Planning  Goal: Discharge to home or other facility with appropriate resources  Outcome: Progressing     Problem: Pain  Goal: Verbalizes/displays adequate comfort level or baseline comfort level  Outcome: Progressing  Flowsheets (Taken 3/8/2024 8311)  Verbalizes/displays adequate comfort level or baseline comfort level:   Encourage patient to monitor pain and request assistance   Administer analgesics based on type and severity of pain and evaluate response   Assess pain using appropriate pain scale

## 2024-03-08 NOTE — DISCHARGE INSTRUCTIONS
INSTRUCTIONS FOR YOUR CARE AFTER SURGERY  You may remove the bandages 48 hours after your surgery.  You may shower any time. Do NOT take baths, use swimming pools, or use hot tubs for 2 weeks.  Keep your head and chest elevated 30 degrees when sleeping to avoid regurgitation.    USE A SIP TIMER AND TAKE A SMALL SIP OF PROTEIN WATER EVERY 10 TO 15 MINUTES DURING THE DAY.    You may use ice packs on your incisions as needed for pain.    You may take Tylenol as needed for pain, in addition to the pain medication that has been prescribed for you. Follow the directions on the bottle. Do not consume more than 3,500 mg of acetaminophen (Tylenol) a day.    Check your blood sugars at least twice a day.  If your blood sugars are consistently 180 or higher or less than 70, call our office ([phone]).  If your blood sugars are in the low 60's, and you feel sweaty and shaky, drink 2 ounces of apple juice or Good Belly juice mixed with 2 ounces of water, and reassess your blood sugar in 30 minutes.  Remember that a blood sugar of 70 is actually normal.        Follow your Bariatric CLEAR  Liquid Diet for 2 weeks. Use protein water or protein shots to supplement protein.  Avoid protein shakes for the first 2 weeks.  Try to get at least 64 ouncs of liquid in each day.    Do NOT lift anything more than 15 pounds (about a bag of groceries) or play contact sports for 2 weeks.   Do NOT drive if you are taking narcotic pain medication.  You may shower the day after discharge.  Replace band aids after showering  Keep your head elevated 30 degrees with sleep to avoid aspirating in your sleep.     DO NOT go to the emergency department first.  Call our office at any hour and the call will be directed to Dr. Chna.  Call if:  There is redness, swelling, or increasing pain in the wound.  There is fluid (pus) coming from the wound.    You have an oral temperature above 102° F (38.9° C).  You notice a bad smell coming from the wound or

## 2024-03-08 NOTE — PROGRESS NOTES
Physical Therapy Med Surg Daily Treatment Note  Facility/Department: [address] MED SURG UNIT  Room: [place] [address]       NAME: Idalia Sarmiento  : 1980 (43 y.o.)  MRN: [id number]  CODE STATUS: Full Code    Date of Service: 3/8/2024    Patient Diagnosis(es): Nausea and vomiting [R11.2]  Vomiting [R11.10]   No chief complaint on file.    Patient Active Problem List    Diagnosis Date Noted    Lightheaded 2024    Nausea and vomiting 2024    Vomiting 2024    Severe obesity (BMI 35.0-39.9) with comorbidity (HCC) 2024    Morbid (severe) obesity due to excess calories (HCC) 2024    Other abnormal glucose 2024    Polycystic ovarian syndrome 2024    Obesity (BMI 30-39.9) 2024    KORI (obstructive sleep apnea) 2023    Gastroesophageal reflux disease 2023    Acute pain of right knee 2019    Abnormal glucose 2019    Chiari I malformation (HCC) 2018    Class 2 severe obesity due to excess calories with serious comorbidity and body mass index (BMI) of 39.0 to 39.9 in adult (HCC) 2017    Atypical endocervical cells on Pap smear     Migraines     PCO (polycystic ovaries)     Eczema         Past Medical History:   Diagnosis Date    Chiari I malformation (HCC)     Eczema     Migraines     PCO (polycystic ovaries)     S/P complete hysterectomy     Dr Dakotah Cervantes, atypical endocervical cells    Strain of musc/tend the rotator cuff of right shoulder, subs 2019    Type II diabetes mellitus, uncontrolled     Dr Benz     Past Surgical History:   Procedure Laterality Date    HYSTERECTOMY, TOTAL ABDOMINAL (CERVIX REMOVED)  2012    Dr Dakotah Cervantes, cervical atypia    LAPAROSCOPIC APPENDECTOMY      ARINA-EN-Y GASTRIC BYPASS N/A 3/4/2024    GASTRIC BYPASS ARINA-EN-Y LAPAROSCOPIC/ROBOTIC, modifier 22 performed by Jl Chan MD at [facility]    UPPER GASTROINTESTINAL ENDOSCOPY N/A 2023    EGD BIOPSY performed by Jl Chan,    Time In 0911   Time Out 0921   Minutes 10      Gait: 10       Jose Rafael CANDIDO Tran, 03/08/24 at 10:43 AM         Definitions for assistance levels  Independent = pt does not require any physical supervision or assistance from another person for activity completion. Device may be needed.  Stand by assistance = pt requires verbal cues or instructions from another person, close to but not touching, to perform the activity  Minimal assistance= pt performs 75% or more of the activity; assistance is required to complete the activity  Moderate assistance= pt performs 50% of the activity; assistance is required to complete the activity  Maximal assistance = pt performs 25% of the activity; assistance is required to complete the activity  Dependent = pt requires total physical assistance to accomplish the task

## 2024-03-08 NOTE — PROGRESS NOTES
Pt resting in bed aox4 even unlabored respirations.  Pt refused Antivert po states \"I don't need it right now\".

## 2024-03-08 NOTE — PROGRESS NOTES
Pt has orders for discharge, nurse spoke to pt and she feels she is not ready to go home.  Nurse notified Dr Chan to advise.

## 2024-03-08 NOTE — PROGRESS NOTES
testing is intact on exam.  No otalgia, tinnitus, hearing changes.  No chest pain pressure palpitations.  Mildly tachycardic.  Patient's lightheadedness is likely secondary to dehydration and hemodynamic changes secondary to nausea, vomiting, opioids, recent surgery.  Will follow and if further concern arises obtain further workup/imaging.     3/7/2024:   Patient seen and examined by myself pertinent portions of the physical examination as well as complete history and neurological examination were completed in its entirety.  At this time no focal or lateralizing signs are seen no significant weakness no dysarthria no aphasia and no cerebellar signs are noted.  Patient may be suffering from toxic versus metabolic protuberances secondary to medications likely also orthostasis secondary to dehydration.  We will see how patient responds to fluid rehydration and discontinuation of pain medications.    3/8/2024:  Lightheadedness in the setting of nausea vomiting, dehydration, opioids.  Lightheadedness has improved.  Awaiting orthostatic blood pressure results.  Message sent to nursing.  Exam is nonfocal  Patient continues on meclizine per attending order.  Collaborating physicians: Dr Gloria    Electronically signed by CORNELIO Reyna - CNP on 3/8/2024 at 12:32 PM

## 2024-03-08 NOTE — PROGRESS NOTES
Pt is awaking in bed states she is feeling better today.  Pt dressing to abd are dry and intact , pt has no co pain or discomfort at this time.

## 2024-03-08 NOTE — PROGRESS NOTES
CLINICAL PHARMACY NOTE: MEDS TO BEDS    Total # of Prescriptions Filled: 5   The following medications were delivered to the patient:  Meclizine 25 mg tab  Ascorbic Acid 500 mg tab  Omeprazole 20mg cap  Potassium Chloride Stephanie ER 20 mg tab  Iron 325 (65 FE) mg tab    Additional Documentation:

## 2024-03-09 NOTE — PROGRESS NOTES
Physical Therapy  Facility/Department: [facility] MED SURG W466/W466-01  Physical Therapy Discharge      NAME: Idalia Sarmiento    : 1980 (43 y.o.)  MRN: [id number]    Account: [id number]  Gender: female      Patient has been discharged from acute [facility] hospital. DC patient from current PT program.      Electronically signed by Fernanda Cevallos PT on 3/9/24 at 2:46 PM EST

## 2024-03-11 NOTE — DISCHARGE SUMMARY
Patient ID  Idalia Sarmiento   [id number]  1980     Admit date: 3/6/2024    Discharge date and time: 3/8/2024  7:00 PM     Admitting Physician: Jl Chan MD     Discharge Physician: Rocio    Admission Diagnoses: Nausea and vomiting [R11.2]  Vomiting [R11.10]    Discharge Diagnoses: ssame    Admission Condition: fair    Discharged Condition: fair    Indication for Admission: post op nausea and emesis    Surgical Procedures: Robotic Geovanna en Y gastric bypass    Hospital Course: Hospital Course: Idalia was admitted with post-op nausea and emesis.  She had one episode of loose melena and her Hct decreased from 38% to 30% with IV hydration.  There were no further episodes of melena or hematochezia and the patient tolerated liquids well.    Consults: Hospitalist    Discharge Exam:  Gen: alert, NAD  Lungs: Breathing comfortably on RA. No tachypnea, retractions, or increased WOB.  Abd: soft, ND, appropriately tender  Incision:  healing well    Disposition: Home.    Patient Instructions:   See D/C instructions    Activity: Do not lift anything heavier than 15 pounds for 2 weeks.    Diet: Bariatric full liquids  Wound Care: may shower.  Cover staples with band aids.    Jl Chan MD   3/11/2024  12:43 PM      Jl Chan MD, FASMBS, FACS, MBSAQIP Verified Surgeon  LCDR-USN-RET, Diplomate of The American Board of Surgery

## 2024-03-12 ENCOUNTER — TELEPHONE (OUTPATIENT)
Dept: BARIATRICS/WEIGHT MGMT | Age: 44
End: 2024-03-12

## 2024-03-15 ENCOUNTER — OFFICE VISIT (OUTPATIENT)
Dept: BARIATRICS/WEIGHT MGMT | Age: 44
End: 2024-03-15

## 2024-03-15 ENCOUNTER — TELEPHONE (OUTPATIENT)
Dept: BARIATRICS/WEIGHT MGMT | Age: 44
End: 2024-03-15

## 2024-03-15 VITALS
OXYGEN SATURATION: 99 % | DIASTOLIC BLOOD PRESSURE: 64 MMHG | HEIGHT: 62 IN | SYSTOLIC BLOOD PRESSURE: 90 MMHG | WEIGHT: 193 LBS | HEART RATE: 79 BPM | BODY MASS INDEX: 35.51 KG/M2

## 2024-03-15 DIAGNOSIS — N83.209 CYST OF OVARY, UNSPECIFIED LATERALITY: Primary | ICD-10-CM

## 2024-03-15 DIAGNOSIS — Z71.3 DIETARY COUNSELING AND SURVEILLANCE: ICD-10-CM

## 2024-03-15 DIAGNOSIS — R06.02 SHORTNESS OF BREATH: ICD-10-CM

## 2024-03-15 DIAGNOSIS — Z98.84 S/P GASTRIC BYPASS: ICD-10-CM

## 2024-03-15 DIAGNOSIS — E66.01 SEVERE OBESITY (BMI 35.0-39.9) WITH COMORBIDITY (HCC): Primary | ICD-10-CM

## 2024-03-15 DIAGNOSIS — R06.02 SHORTNESS OF BREATH: Primary | ICD-10-CM

## 2024-03-15 LAB — D DIMER PPP FEU-MCNC: 9.95 MG/L FEU (ref 0–0.5)

## 2024-03-15 PROCEDURE — 99024 POSTOP FOLLOW-UP VISIT: CPT | Performed by: SURGERY

## 2024-03-15 RX ORDER — ENOXAPARIN SODIUM 100 MG/ML
80 INJECTION SUBCUTANEOUS 2 TIMES DAILY
Qty: 22.4 ML | Refills: 0 | Status: SHIPPED | OUTPATIENT
Start: 2024-03-15 | End: 2024-03-29

## 2024-03-15 NOTE — PROGRESS NOTES
Medical Nutrition Therapy  [facility]    Patient here for her 1 week post op Geovanna en Y gastric bypass.       Patient reports:    3/15/2024  Height:   Ht Readings from Last 1 Encounters:   03/15/24 1.575 m (5' 2\")     Weight:   Wt Readings from Last 1 Encounters:   03/15/24 87.5 kg (193 lb)     BMI:   BMI Readings from Last 1 Encounters:   03/15/24 35.30 kg/m²       Pre-Surgery Weight: 214 lbs  TBW lost: 21 lbs  % EBW lost: 27 %    Patient has lost a total of 27 lbs from start of program.    Labs reviewed: N/A    Intolerances/Difficulties: readmit POD 2 with nausea and vomiting, dehydration    Vitamins:  Bariatric MVI with iron  and Calcium Citrate 1000-1500mg/day    Protein goal: not met, 20 grams/day    Fluid goal: not met, > 64 oz/day     Exercise: walking daily     Nutrition Diagnosis:  PES: Inadequate protein and fluid intake related to volume restriction s/p weight loss surgery clear liquid diet restriction as evidenced by report of 20 grams protein/day.    Nutrition Intervention:  Education provided to include review of post surgical diet progression, meal timing, protein and fluid intake, vitamins, and exercise.     Nutrition Monitoring and evaluation:  Protein intake: 60-75 grams/day (1.2-1.5gr/kg IBW)  Fluid intake: 64-90oz sugar free, calorie free, caffeine free, non carbonated beverages  Vitamins: Bariatric MVI with iron,  Calcium citrate 1000-1500mg/day in divided doses  Exercise: 45-60 minutes of exercise 5 days/week, including at least 2 days of strength training    Plan/Recommendation:   Add additional protein water to increase protein to at least 40  grams/day  Advance to puree diet week 3  Begin taking Bariatric MVI with iron and Calcium citrate 500-600mg twice daily  Continue daily walking  Keep daily food journal    Time spent with patient 15 minutes    Follow up per program protocol    Joyce oGnsalez RD     82.8 Male

## 2024-03-15 NOTE — PROGRESS NOTES
BMI 35.30 kg/m²     General: No acute distress  HEENT: P PERRLA, no scleral icterus.  Trachea midline.  Thoracic: Clear to auscultation bilaterally.  Cardiac: Regular rate and rhythm with no rubs clicks or murmurs.  Abdomen: The incisions are healing well. I removed the staples and placed steri-strips     Extremities: No clubbing cyanosis or edema.  Neurologic: No gross motor or sensory defects.        Assessment and Plan:      Idalia DARDEN Torsten   is 1 week  post-op from Robotic Geovanna en Y gastric bypass.  and doing well.  Follow up in 1 month.  Start MVI and calcium citrate.    Salazar Chan MD, FACS, [facility]

## 2024-03-15 NOTE — PATIENT INSTRUCTIONS
Keep up the hard work!    Please get the D-dimer lab now.    Please get labs before your next visit in 1 month.    Start multi-vitamin and calcium supplementation.    THE BIG FOUR GUIDELINES:  1.  Count calories!  People who count calories eat less.  Use the daily plate or other apps for your smart phone or computer.  The more you count the more of an expert you will become and will get easier and easier.  If you are trying to lose weight and exercising a lot, keep calories to around the thousand to 1200 a day.  If you are not exercising consistently try to limit them to around 800 a day.    2.  Separate liquids and solids.  Wait 30 minutes to an hour after you eat before drinking liquids.  This will reduce the total amount of food you eat in the meal.    3.  Exercise!  You need up to 5 hours a week with a combination of cardio and resistance or weight training in order to keep excess body fat off.    4.  Sleep!   Try to get 7 or more hours of sleep a night.  If you have obstructive sleep apnea definitely use your CPAP machine.    THE RULE OF 20'S:  For every meal, chew each bite 20 seconds.  Then put the fork down and wait 20 seconds after you swallow before picking up the fork again.  Each meal should take at least 20 minutes so your brain can register that you are full.    If you are happy with your care, please go  to [email or web address] and leave a review for Jl Chan MD.  The charitable nature of our practice makes a marketing budget a challenge and your review could help us help more people.

## 2024-03-18 ENCOUNTER — TELEPHONE (OUTPATIENT)
Dept: BARIATRICS/WEIGHT MGMT | Age: 44
End: 2024-03-18

## 2024-03-18 ENCOUNTER — TELEPHONE (OUTPATIENT)
Dept: GASTROENTEROLOGY | Age: 44
End: 2024-03-18

## 2024-03-18 ENCOUNTER — HOSPITAL ENCOUNTER (OUTPATIENT)
Dept: CT IMAGING | Age: 44
Discharge: HOME | End: 2024-03-20
Attending: SURGERY
Payer: COMMERCIAL

## 2024-03-18 DIAGNOSIS — K76.9 LIVER LESION: ICD-10-CM

## 2024-03-18 DIAGNOSIS — R06.02 SOB (SHORTNESS OF BREATH): ICD-10-CM

## 2024-03-18 DIAGNOSIS — R06.02 SOB (SHORTNESS OF BREATH): Primary | ICD-10-CM

## 2024-03-18 DIAGNOSIS — E87.6 HYPOKALEMIA: Primary | ICD-10-CM

## 2024-03-18 LAB
ALBUMIN SERPL-MCNC: 3.8 G/DL (ref 3.5–4.6)
ALP SERPL-CCNC: 87 U/L (ref 40–130)
ALT SERPL-CCNC: 24 U/L (ref 0–33)
ANION GAP SERPL CALCULATED.3IONS-SCNC: 20 MEQ/L (ref 9–15)
AST SERPL-CCNC: 16 U/L (ref 0–35)
BASOPHILS # BLD: 0 K/UL (ref 0–0.2)
BASOPHILS NFR BLD: 0.3 %
BILIRUB SERPL-MCNC: 0.5 MG/DL (ref 0.2–0.7)
BUN SERPL-MCNC: 11 MG/DL (ref 6–20)
CALCIUM SERPL-MCNC: 8.7 MG/DL (ref 8.5–9.9)
CHLORIDE SERPL-SCNC: 104 MEQ/L (ref 95–107)
CO2 SERPL-SCNC: 14 MEQ/L (ref 20–31)
CREAT SERPL-MCNC: 0.56 MG/DL (ref 0.5–0.9)
EOSINOPHIL # BLD: 0 K/UL (ref 0–0.7)
EOSINOPHIL NFR BLD: 0.7 %
ERYTHROCYTE [DISTWIDTH] IN BLOOD BY AUTOMATED COUNT: 13.7 % (ref 11.5–14.5)
GLOBULIN SER CALC-MCNC: 3.1 G/DL (ref 2.3–3.5)
GLUCOSE SERPL-MCNC: 83 MG/DL (ref 70–99)
HCT VFR BLD AUTO: 35.8 % (ref 37–47)
HGB BLD-MCNC: 11.9 G/DL (ref 12–16)
LYMPHOCYTES # BLD: 2.1 K/UL (ref 1–4.8)
LYMPHOCYTES NFR BLD: 35 %
MCH RBC QN AUTO: 29.2 PG (ref 27–31.3)
MCHC RBC AUTO-ENTMCNC: 33.2 % (ref 33–37)
MCV RBC AUTO: 87.7 FL (ref 79.4–94.8)
MONOCYTES # BLD: 0.6 K/UL (ref 0.2–0.8)
MONOCYTES NFR BLD: 10.6 %
NEUTROPHILS # BLD: 3.2 K/UL (ref 1.4–6.5)
NEUTS SEG NFR BLD: 52.9 %
PLATELET # BLD AUTO: 467 K/UL (ref 130–400)
POTASSIUM SERPL-SCNC: 3 MEQ/L (ref 3.4–4.9)
PROT SERPL-MCNC: 6.9 G/DL (ref 6.3–8)
RBC # BLD AUTO: 4.08 M/UL (ref 4.2–5.4)
SODIUM SERPL-SCNC: 138 MEQ/L (ref 135–144)
WBC # BLD AUTO: 6.1 K/UL (ref 4.8–10.8)

## 2024-03-18 PROCEDURE — 71275 CT ANGIOGRAPHY CHEST: CPT

## 2024-03-18 PROCEDURE — 6360000004 HC RX CONTRAST MEDICATION: Performed by: SURGERY

## 2024-03-18 PROCEDURE — 36415 COLL VENOUS BLD VENIPUNCTURE: CPT

## 2024-03-18 RX ORDER — POTASSIUM CHLORIDE 20 MEQ/1
40 TABLET, EXTENDED RELEASE ORAL 2 TIMES DAILY
Qty: 60 TABLET | Refills: 0 | Status: SHIPPED | OUTPATIENT
Start: 2024-03-18 | End: 2024-04-02

## 2024-03-18 RX ADMIN — IOPAMIDOL 75 ML: 755 INJECTION, SOLUTION INTRAVENOUS at 11:50

## 2024-03-18 NOTE — TELEPHONE ENCOUNTER
I called Idalia to report her normal chest CT and low potassium.  I requested she stop the Lovenox treatment and start Potassium 20 MEQ BID with a repeat BMP in one week.      Salazar Chan MD, FACS, [facility]

## 2024-03-18 NOTE — TELEPHONE ENCOUNTER
Called the patient and discussed BMP findings.  Patient currently on potassium supplements for hypokalemia.  Current potassium 3.0.  Patient denies nausea vomiting or diarrhea.  Recommended to increase potassium to 20 mEq twice daily and follow-up with primary care physician accordingly.  Patient voiced understanding

## 2024-03-18 NOTE — TELEPHONE ENCOUNTER
Idalia c/o SOB.  Therapeutic lovenox is ordered and discussed with Idalia. Will obtain CTA of chest to rule out PE.    Salazar Chan MD, FACS, [facility]

## 2024-03-20 DIAGNOSIS — N83.209 CYST OF OVARY, UNSPECIFIED LATERALITY: ICD-10-CM

## 2024-03-21 ENCOUNTER — TELEPHONE (OUTPATIENT)
Dept: BARIATRICS/WEIGHT MGMT | Age: 44
End: 2024-03-21

## 2024-03-21 LAB — CA 125: 44 U/ML (ref 0–38)

## 2024-03-21 NOTE — TELEPHONE ENCOUNTER
Patient called to report she dislikes premier protein shakes since trying them after surgery.  Patient has advanced to puree diet however states dislikes texture. Instructed patient to resume protein childs to meet protein needs and find a few protein foods such as Greek yogurt, cottage cheese, or eggs to help meet protein needs during puree stage.  Patient to follow up in office in 2 weeks.

## 2024-03-26 NOTE — PROGRESS NOTES
Subjective  Idalia Sarmiento 1980 is a 43 y.o. female who presents today with:  Chief Complaint   Patient presents with    Shortness of Breath     3/4 after surgery Pt states she has been SOB ,was seen in ED 3/22 and Pt she was checked out but told to follow up with PC       HPI  SOB/THRASHER  - patient had  bariatric surgery on 03/04/24. Shortly after she started to work and noticed SOB. She was seen by bariatric surgeon on 03/15/24. She was subsequently drawn for blood and d-dimer, which was elevated. She underwent CTA which was negative. Potassium was corrected and she has increased on this. She was also seen in the ER on 03/22/24 at [facility]. CT scan showed old 3 mm granuloma and atelectasia. Chest XR negative. Potassium was improving.   - Patient complaining of atypical chest pain on the right side of the chest and THRASHER. She reports walking from the parking lot to inside would cause her to be out of breath. She reports she would have to sit for 10-15 minutes to feel better. Attributed with some dizziness. Patient is vomiting, but states likely d/t surgery. No syncope. Not a smoker. No history of asthma.     Review of Systems   Constitutional:  Negative for activity change, appetite change, chills and fever.   HENT:  Negative for congestion, drooling, sinus pressure, sinus pain and sore throat.    Eyes:  Negative for visual disturbance.   Respiratory:  Positive for shortness of breath and wheezing. Negative for cough and chest tightness.    Cardiovascular:  Positive for chest pain. Negative for palpitations and leg swelling.   Gastrointestinal:  Negative for abdominal pain, diarrhea, nausea and vomiting.   Endocrine: Negative for cold intolerance.   Genitourinary:  Negative for dysuria, flank pain, frequency and hematuria.   Musculoskeletal:  Negative for arthralgias and back pain.   Skin:  Negative for rash.   Allergic/Immunologic: Negative for food allergies.   Neurological:  Positive for dizziness. Negative for

## 2024-03-27 ENCOUNTER — HOSPITAL ENCOUNTER (OUTPATIENT)
Dept: MRI IMAGING | Age: 44
Discharge: HOME | End: 2024-03-29
Attending: INTERNAL MEDICINE
Payer: COMMERCIAL

## 2024-03-27 ENCOUNTER — OFFICE VISIT (OUTPATIENT)
Dept: FAMILY MEDICINE CLINIC | Age: 44
End: 2024-03-27
Payer: COMMERCIAL

## 2024-03-27 VITALS
RESPIRATION RATE: 18 BRPM | SYSTOLIC BLOOD PRESSURE: 110 MMHG | TEMPERATURE: 97 F | DIASTOLIC BLOOD PRESSURE: 76 MMHG | HEART RATE: 78 BPM | OXYGEN SATURATION: 100 % | BODY MASS INDEX: 33.71 KG/M2 | HEIGHT: 62 IN | WEIGHT: 183.2 LBS

## 2024-03-27 DIAGNOSIS — R06.02 SOB (SHORTNESS OF BREATH): ICD-10-CM

## 2024-03-27 DIAGNOSIS — E87.6 HYPOKALEMIA: ICD-10-CM

## 2024-03-27 DIAGNOSIS — R06.09 DOE (DYSPNEA ON EXERTION): ICD-10-CM

## 2024-03-27 DIAGNOSIS — K76.9 LIVER LESION: ICD-10-CM

## 2024-03-27 DIAGNOSIS — R07.89 ATYPICAL CHEST PAIN: Primary | ICD-10-CM

## 2024-03-27 DIAGNOSIS — J98.11 ATELECTASIS: ICD-10-CM

## 2024-03-27 DIAGNOSIS — N83.8 OVARIAN MASS: ICD-10-CM

## 2024-03-27 LAB
ANION GAP SERPL CALCULATED.3IONS-SCNC: 22 MEQ/L (ref 9–15)
BUN SERPL-MCNC: 9 MG/DL (ref 6–20)
CALCIUM SERPL-MCNC: 9.4 MG/DL (ref 8.5–9.9)
CHLORIDE SERPL-SCNC: 103 MEQ/L (ref 95–107)
CO2 SERPL-SCNC: 19 MEQ/L (ref 20–31)
CREAT SERPL-MCNC: 0.5 MG/DL (ref 0.5–0.9)
GLUCOSE SERPL-MCNC: 73 MG/DL (ref 70–99)
POTASSIUM SERPL-SCNC: 3.1 MEQ/L (ref 3.4–4.9)
SODIUM SERPL-SCNC: 144 MEQ/L (ref 135–144)

## 2024-03-27 PROCEDURE — 74183 MRI ABD W/O CNTR FLWD CNTR: CPT

## 2024-03-27 PROCEDURE — 93000 ELECTROCARDIOGRAM COMPLETE: CPT | Performed by: PHYSICIAN ASSISTANT

## 2024-03-27 PROCEDURE — 6360000004 HC RX CONTRAST MEDICATION: Performed by: INTERNAL MEDICINE

## 2024-03-27 PROCEDURE — G8427 DOCREV CUR MEDS BY ELIG CLIN: HCPCS | Performed by: PHYSICIAN ASSISTANT

## 2024-03-27 PROCEDURE — 1111F DSCHRG MED/CURRENT MED MERGE: CPT | Performed by: PHYSICIAN ASSISTANT

## 2024-03-27 PROCEDURE — 99215 OFFICE O/P EST HI 40 MIN: CPT | Performed by: PHYSICIAN ASSISTANT

## 2024-03-27 PROCEDURE — G8484 FLU IMMUNIZE NO ADMIN: HCPCS | Performed by: PHYSICIAN ASSISTANT

## 2024-03-27 PROCEDURE — G8417 CALC BMI ABV UP PARAM F/U: HCPCS | Performed by: PHYSICIAN ASSISTANT

## 2024-03-27 PROCEDURE — 1036F TOBACCO NON-USER: CPT | Performed by: PHYSICIAN ASSISTANT

## 2024-03-27 PROCEDURE — A9577 INJ MULTIHANCE: HCPCS | Performed by: INTERNAL MEDICINE

## 2024-03-27 RX ADMIN — GADOBENATE DIMEGLUMINE 20 ML: 529 INJECTION, SOLUTION INTRAVENOUS at 08:16

## 2024-03-27 ASSESSMENT — ENCOUNTER SYMPTOMS
COUGH: 0
SORE THROAT: 0
SINUS PAIN: 0
WHEEZING: 1
ABDOMINAL PAIN: 0
SINUS PRESSURE: 0
SHORTNESS OF BREATH: 1
VOMITING: 0
BACK PAIN: 0
NAUSEA: 0
DIARRHEA: 0
CHEST TIGHTNESS: 0

## 2024-03-27 ASSESSMENT — VISUAL ACUITY: OU: 1

## 2024-04-01 ENCOUNTER — HOSPITAL ENCOUNTER (EMERGENCY)
Age: 44
Discharge: HOME | End: 2024-04-01
Attending: EMERGENCY MEDICINE
Payer: COMMERCIAL

## 2024-04-01 ENCOUNTER — OFFICE VISIT (OUTPATIENT)
Dept: OBGYN CLINIC | Age: 44
End: 2024-04-01
Payer: COMMERCIAL

## 2024-04-01 ENCOUNTER — APPOINTMENT (OUTPATIENT)
Dept: CT IMAGING | Age: 44
End: 2024-04-01
Payer: COMMERCIAL

## 2024-04-01 VITALS
DIASTOLIC BLOOD PRESSURE: 66 MMHG | HEIGHT: 62 IN | OXYGEN SATURATION: 99 % | RESPIRATION RATE: 20 BRPM | WEIGHT: 182 LBS | SYSTOLIC BLOOD PRESSURE: 120 MMHG | HEART RATE: 61 BPM | TEMPERATURE: 98 F | BODY MASS INDEX: 33.49 KG/M2

## 2024-04-01 VITALS
BODY MASS INDEX: 33.49 KG/M2 | SYSTOLIC BLOOD PRESSURE: 110 MMHG | DIASTOLIC BLOOD PRESSURE: 70 MMHG | HEART RATE: 92 BPM | WEIGHT: 182 LBS | HEIGHT: 62 IN

## 2024-04-01 DIAGNOSIS — Z87.42 HX OF OVARIAN CYST: Primary | ICD-10-CM

## 2024-04-01 DIAGNOSIS — R11.2 NAUSEA AND VOMITING, UNSPECIFIED VOMITING TYPE: Primary | ICD-10-CM

## 2024-04-01 DIAGNOSIS — R10.9 ABDOMINAL PAIN, UNSPECIFIED ABDOMINAL LOCATION: ICD-10-CM

## 2024-04-01 DIAGNOSIS — N83.201 RIGHT OVARIAN CYST: ICD-10-CM

## 2024-04-01 LAB
ALBUMIN SERPL-MCNC: 4.2 G/DL (ref 3.5–4.6)
ALP SERPL-CCNC: 87 U/L (ref 40–130)
ALT SERPL-CCNC: 39 U/L (ref 0–33)
ANION GAP SERPL CALCULATED.3IONS-SCNC: 20 MEQ/L (ref 9–15)
AST SERPL-CCNC: 29 U/L (ref 0–35)
BASOPHILS # BLD: 0 K/UL (ref 0–0.2)
BASOPHILS NFR BLD: 0.4 %
BILIRUB SERPL-MCNC: 0.6 MG/DL (ref 0.2–0.7)
BUN SERPL-MCNC: 7 MG/DL (ref 6–20)
CALCIUM SERPL-MCNC: 9.3 MG/DL (ref 8.5–9.9)
CHLORIDE SERPL-SCNC: 101 MEQ/L (ref 95–107)
CO2 SERPL-SCNC: 20 MEQ/L (ref 20–31)
CREAT SERPL-MCNC: 0.56 MG/DL (ref 0.5–0.9)
EOSINOPHIL # BLD: 0.1 K/UL (ref 0–0.7)
EOSINOPHIL NFR BLD: 0.7 %
ERYTHROCYTE [DISTWIDTH] IN BLOOD BY AUTOMATED COUNT: 14.6 % (ref 11.5–14.5)
GLOBULIN SER CALC-MCNC: 3.6 G/DL (ref 2.3–3.5)
GLUCOSE SERPL-MCNC: 95 MG/DL (ref 70–99)
HCT VFR BLD AUTO: 37.6 % (ref 37–47)
HGB BLD-MCNC: 12.5 G/DL (ref 12–16)
LACTATE BLDV-SCNC: 1.3 MMOL/L (ref 0.5–2.2)
LIPASE SERPL-CCNC: 60 U/L (ref 12–95)
LYMPHOCYTES # BLD: 2.6 K/UL (ref 1–4.8)
LYMPHOCYTES NFR BLD: 39 %
MCH RBC QN AUTO: 29.6 PG (ref 27–31.3)
MCHC RBC AUTO-ENTMCNC: 33.2 % (ref 33–37)
MCV RBC AUTO: 88.9 FL (ref 79.4–94.8)
MONOCYTES # BLD: 0.9 K/UL (ref 0.2–0.8)
MONOCYTES NFR BLD: 12.9 %
NEUTROPHILS # BLD: 3.2 K/UL (ref 1.4–6.5)
NEUTS SEG NFR BLD: 46.7 %
PLATELET # BLD AUTO: 280 K/UL (ref 130–400)
POTASSIUM SERPL-SCNC: 3 MEQ/L (ref 3.4–4.9)
PROT SERPL-MCNC: 7.8 G/DL (ref 6.3–8)
RBC # BLD AUTO: 4.23 M/UL (ref 4.2–5.4)
SODIUM SERPL-SCNC: 141 MEQ/L (ref 135–144)
WBC # BLD AUTO: 6.8 K/UL (ref 4.8–10.8)

## 2024-04-01 PROCEDURE — 1111F DSCHRG MED/CURRENT MED MERGE: CPT | Performed by: OBSTETRICS & GYNECOLOGY

## 2024-04-01 PROCEDURE — 6360000004 HC RX CONTRAST MEDICATION: Performed by: EMERGENCY MEDICINE

## 2024-04-01 PROCEDURE — 74177 CT ABD & PELVIS W/CONTRAST: CPT

## 2024-04-01 PROCEDURE — G8417 CALC BMI ABV UP PARAM F/U: HCPCS | Performed by: OBSTETRICS & GYNECOLOGY

## 2024-04-01 PROCEDURE — 6360000002 HC RX W HCPCS: Performed by: EMERGENCY MEDICINE

## 2024-04-01 PROCEDURE — 83605 ASSAY OF LACTIC ACID: CPT

## 2024-04-01 PROCEDURE — 80053 COMPREHEN METABOLIC PANEL: CPT

## 2024-04-01 PROCEDURE — 99285 EMERGENCY DEPT VISIT HI MDM: CPT

## 2024-04-01 PROCEDURE — 2580000003 HC RX 258: Performed by: EMERGENCY MEDICINE

## 2024-04-01 PROCEDURE — 83690 ASSAY OF LIPASE: CPT

## 2024-04-01 PROCEDURE — 99213 OFFICE O/P EST LOW 20 MIN: CPT | Performed by: OBSTETRICS & GYNECOLOGY

## 2024-04-01 PROCEDURE — 1036F TOBACCO NON-USER: CPT | Performed by: OBSTETRICS & GYNECOLOGY

## 2024-04-01 PROCEDURE — G8427 DOCREV CUR MEDS BY ELIG CLIN: HCPCS | Performed by: OBSTETRICS & GYNECOLOGY

## 2024-04-01 PROCEDURE — 85025 COMPLETE CBC W/AUTO DIFF WBC: CPT

## 2024-04-01 PROCEDURE — 36415 COLL VENOUS BLD VENIPUNCTURE: CPT

## 2024-04-01 PROCEDURE — 96374 THER/PROPH/DIAG INJ IV PUSH: CPT

## 2024-04-01 RX ORDER — ONDANSETRON 4 MG/1
4 TABLET, ORALLY DISINTEGRATING ORAL 3 TIMES DAILY PRN
Qty: 21 TABLET | Refills: 0 | Status: SHIPPED | OUTPATIENT
Start: 2024-04-01

## 2024-04-01 RX ORDER — 0.9 % SODIUM CHLORIDE 0.9 %
1000 INTRAVENOUS SOLUTION INTRAVENOUS ONCE
Status: COMPLETED | OUTPATIENT
Start: 2024-04-01 | End: 2024-04-01

## 2024-04-01 RX ORDER — PROMETHAZINE HYDROCHLORIDE 25 MG/1
25 SUPPOSITORY RECTAL 2 TIMES DAILY PRN
Qty: 15 SUPPOSITORY | Refills: 0 | Status: SHIPPED | OUTPATIENT
Start: 2024-04-01 | End: 2024-04-08

## 2024-04-01 RX ORDER — PROCHLORPERAZINE EDISYLATE 5 MG/ML
10 INJECTION INTRAMUSCULAR; INTRAVENOUS ONCE
Status: COMPLETED | OUTPATIENT
Start: 2024-04-01 | End: 2024-04-01

## 2024-04-01 RX ORDER — PROCHLORPERAZINE EDISYLATE 5 MG/ML
10 INJECTION INTRAMUSCULAR; INTRAVENOUS EVERY 6 HOURS PRN
Status: DISCONTINUED | OUTPATIENT
Start: 2024-04-01 | End: 2024-04-01

## 2024-04-01 RX ADMIN — SODIUM CHLORIDE 1000 ML: 9 INJECTION, SOLUTION INTRAVENOUS at 18:54

## 2024-04-01 RX ADMIN — PROCHLORPERAZINE EDISYLATE 10 MG: 5 INJECTION INTRAMUSCULAR; INTRAVENOUS at 18:52

## 2024-04-01 RX ADMIN — IOPAMIDOL 75 ML: 755 INJECTION, SOLUTION INTRAVENOUS at 19:34

## 2024-04-01 ASSESSMENT — PAIN - FUNCTIONAL ASSESSMENT: PAIN_FUNCTIONAL_ASSESSMENT: NONE - DENIES PAIN

## 2024-04-01 ASSESSMENT — ENCOUNTER SYMPTOMS
VOMITING: 1
EYE PAIN: 0
SORE THROAT: 0
NAUSEA: 1
SHORTNESS OF BREATH: 0
ABDOMINAL PAIN: 0
CHEST TIGHTNESS: 0

## 2024-04-01 ASSESSMENT — LIFESTYLE VARIABLES
HOW MANY STANDARD DRINKS CONTAINING ALCOHOL DO YOU HAVE ON A TYPICAL DAY: PATIENT DOES NOT DRINK
HOW OFTEN DO YOU HAVE A DRINK CONTAINING ALCOHOL: NEVER

## 2024-04-01 NOTE — ED PROVIDER NOTES
disintegrating tablet Take 1 tablet by mouth 3 times daily as needed for Nausea or Vomiting, Disp-21 tablet, R-0Normal      promethazine (PROMETHEGAN) 25 MG suppository Place 1 suppository rectally 2 times daily as needed for Nausea, Disp-15 suppository, R-0Normal       !! - Potential duplicate medications found. Please discuss with provider.        Controlled Substances Monitoring:          No data to display                (Please note that portions of this note were completed with a voice recognition program.  Efforts were made to edit the dictations but occasionally words are mis-transcribed.)    Margie Hernandez DO (electronically signed)  Attending Emergency Physician            Margie Hernandez DO  04/03/24 [id number]

## 2024-04-01 NOTE — ED TRIAGE NOTES
Pt arrived by private car with report of vomiting for a couple of days   Pt states the \"vomit is just spit up\"   Pt had recent bariatric surgery   Pt is unable to keep anything down

## 2024-04-01 NOTE — ED NOTES
Labs collected and sent at this time. Patient sitting up in the bed with eyes open, call light with in reach

## 2024-04-01 NOTE — PROGRESS NOTES
Results Review      Idalia Sarmiento is a 43 y.o. year old female here to discuss US results.   PREVIOUS VISIT: US not done , ca-125 mildly elevated , recent bariatric surgery .     Vitals:  /70 (Site: Left Upper Arm)   Pulse 92   Ht 1.575 m (5' 2\")   Wt 82.6 kg (182 lb)   LMP  (LMP Unknown) Comment:   BMI 33.29 kg/m²   Allergies:  Nsaids  Past Medical History:   Diagnosis Date    Chiari I malformation (HCC)     Eczema     Migraines     PCO (polycystic ovaries)     S/P complete hysterectomy     Dr Dakotah Cervantes, atypical endocervical cells    Strain of musc/tend the rotator cuff of right shoulder, subs 2019    Type II diabetes mellitus, uncontrolled     Dr Benz     Past Surgical History:   Procedure Laterality Date    HYSTERECTOMY, TOTAL ABDOMINAL (CERVIX REMOVED)  2012    Dr Dakotah Cervantes, cervical atypia    LAPAROSCOPIC APPENDECTOMY      ARINA-EN-Y GASTRIC BYPASS N/A 3/4/2024    GASTRIC BYPASS ARINA-EN-Y LAPAROSCOPIC/ROBOTIC, modifier 22 performed by Jl Chan MD at [facility] OR    UPPER GASTROINTESTINAL ENDOSCOPY N/A 2023    EGD BIOPSY performed by Jl Chan MD at [facility]     OB History          4    Para   4    Term   4            AB        Living   4         SAB        IAB        Ectopic        Molar        Multiple        Live Births                     Family History   Problem Relation Age of Onset    Cancer Mother         ovarian? dec age 30    Cancer Father         dec age 60    Colon Cancer Neg Hx      Social History     Socioeconomic History    Marital status: Single     Spouse name: Not on file    Number of children: 4    Years of education: Not on file    Highest education level: Not on file   Occupational History    Occupation: home health aide   Tobacco Use    Smoking status: Never     Passive exposure: Never    Smokeless tobacco: Never   Vaping Use    Vaping Use: Never used   Substance and Sexual Activity    Alcohol use: No

## 2024-04-02 LAB
PERFORMED ON: ABNORMAL
POC CREATININE: 0.5 MG/DL (ref 0.6–1.2)
POC SAMPLE TYPE: ABNORMAL

## 2024-04-05 ENCOUNTER — OFFICE VISIT (OUTPATIENT)
Dept: BARIATRICS/WEIGHT MGMT | Age: 44
End: 2024-04-05

## 2024-04-05 VITALS
WEIGHT: 178 LBS | BODY MASS INDEX: 32.76 KG/M2 | HEIGHT: 62 IN | OXYGEN SATURATION: 100 % | SYSTOLIC BLOOD PRESSURE: 106 MMHG | HEART RATE: 92 BPM | DIASTOLIC BLOOD PRESSURE: 78 MMHG

## 2024-04-05 DIAGNOSIS — R11.2 NAUSEA AND VOMITING, UNSPECIFIED VOMITING TYPE: Primary | ICD-10-CM

## 2024-04-05 PROCEDURE — 99024 POSTOP FOLLOW-UP VISIT: CPT | Performed by: SURGERY

## 2024-04-05 RX ORDER — APREPITANT 125 MG/1
125 CAPSULE ORAL ONCE
Qty: 1 CAPSULE | Refills: 0 | Status: SHIPPED | OUTPATIENT
Start: 2024-04-05 | End: 2024-04-05

## 2024-04-05 RX ORDER — PALONOSETRON 0.05 MG/ML
0.25 INJECTION, SOLUTION INTRAVENOUS ONCE
Status: CANCELLED
Start: 2024-04-05

## 2024-04-05 NOTE — PROGRESS NOTES
Post Op Clinic Visit      Idalia Sarmiento  is 1 month  post-op from Robotic Geovanna en Y gastric bypass.  She continues to c/o regurgitation of liquids and solids.  A CT scan with oral contrast on 4/1/2024 did not show any obstruction at the gastrojejunostomy or jejunojejunostomy.  Labs did show hypokalemia.      Idalia reports that she is tolerating 32+ ounces of protein water a day, but that she is regurgitating a lot of mucous.  She reports compliance with potassium supplementation.      Idalia has lost 22 pounds since surgery.      Physical Exam:    /78   Pulse 92   Ht 1.575 m (5' 2\")   Wt 80.7 kg (178 lb)   LMP  (LMP Unknown) Comment: 2012  SpO2 100%   BMI 32.56 kg/m²     General: No acute distress  HEENT: P PERRLA, no scleral icterus.  Trachea midline.  Thoracic: Clear to auscultation bilaterally.  Cardiac: Regular rate and rhythm with no rubs clicks or murmurs.  Abdomen: The incisions are healing well.    Extremities: No clubbing cyanosis or edema.  Neurologic: No gross motor or sensory defects.        Assessment and Plan:      Idalia Sarmiento   is 1 month  post-op from Robotic Geovanna en Y gastric bypass.  and doing well. We are sending for outpatient IV infusion of 2 liters NS with potassium tonight.  It is too early for a balloon dilation of the gastro-jejunostomy if that were indicated.  We will plan on continued symptom management, elevation of HOB with sleeping, and planned outpatient infusions once a week for the next two weeks.  After that, we can more safely perform a balloon dilation if indicated.    Salazar Chan MD, FACS, [facility]

## 2024-04-05 NOTE — PATIENT INSTRUCTIONS
Proceed tonight with outpatient Infusion.    Take a small sip of protein water every 15 minutes during the day.    Keep your chest and head elevated with two pillows (30 degrees) while sleeping to avoid breathing in any vomit while sleeping.    Take your potassium supplementation daily.    Take Emend today for nausea/vomiting.

## 2024-04-09 ENCOUNTER — HOSPITAL ENCOUNTER (OUTPATIENT)
Dept: INFUSION THERAPY | Age: 44
Setting detail: INFUSION SERIES
Discharge: HOME | End: 2024-04-09
Payer: COMMERCIAL

## 2024-04-09 ENCOUNTER — TELEPHONE (OUTPATIENT)
Dept: BARIATRICS/WEIGHT MGMT | Age: 44
End: 2024-04-09

## 2024-04-09 VITALS
RESPIRATION RATE: 18 BRPM | TEMPERATURE: 97.5 F | OXYGEN SATURATION: 100 % | SYSTOLIC BLOOD PRESSURE: 129 MMHG | DIASTOLIC BLOOD PRESSURE: 72 MMHG | HEART RATE: 102 BPM

## 2024-04-09 DIAGNOSIS — R11.2 NAUSEA AND VOMITING, UNSPECIFIED VOMITING TYPE: Primary | ICD-10-CM

## 2024-04-09 PROCEDURE — 96365 THER/PROPH/DIAG IV INF INIT: CPT

## 2024-04-09 PROCEDURE — 96361 HYDRATE IV INFUSION ADD-ON: CPT

## 2024-04-09 PROCEDURE — 96366 THER/PROPH/DIAG IV INF ADDON: CPT

## 2024-04-09 PROCEDURE — 6360000002 HC RX W HCPCS: Performed by: SURGERY

## 2024-04-09 PROCEDURE — 2580000003 HC RX 258: Performed by: SURGERY

## 2024-04-09 PROCEDURE — 96360 HYDRATION IV INFUSION INIT: CPT

## 2024-04-09 PROCEDURE — 96375 TX/PRO/DX INJ NEW DRUG ADDON: CPT

## 2024-04-09 RX ORDER — PALONOSETRON 0.05 MG/ML
0.25 INJECTION, SOLUTION INTRAVENOUS ONCE
Status: CANCELLED
Start: 2024-04-09 | End: 2024-04-09

## 2024-04-09 RX ORDER — PALONOSETRON 0.05 MG/ML
0.25 INJECTION, SOLUTION INTRAVENOUS ONCE
Status: COMPLETED | OUTPATIENT
Start: 2024-04-09 | End: 2024-04-09

## 2024-04-09 RX ADMIN — PALONOSETRON 0.25 MG: 0.05 INJECTION, SOLUTION INTRAVENOUS at 09:51

## 2024-04-09 RX ADMIN — POTASSIUM CHLORIDE: 2 INJECTION, SOLUTION, CONCENTRATE INTRAVENOUS at 09:54

## 2024-04-09 RX ADMIN — POTASSIUM CHLORIDE: 2 INJECTION, SOLUTION, CONCENTRATE INTRAVENOUS at 11:00

## 2024-04-09 NOTE — FLOWSHEET NOTE
Infusion is complete. Tolerated well. Left the unit  ambulatory. All equipment used in the care for this patient has been cleaned.

## 2024-04-09 NOTE — TELEPHONE ENCOUNTER
Call placed to patient's daughter. Patient did not receive IV hydration IV on Friday, April 5th. Informed daughter I wasn't able to contact her mother by phone this morning. Informed her I left message requesting call back.. Informed daughter I can get her scheduled for infusion center appointment this morning. Daughter will reach out to patient and call office back to confirm.

## 2024-04-09 NOTE — FLOWSHEET NOTE
Patient to the floor ambulatory with her family for her IV hydration. Vital signs taken. Denies any nausea at this time, but is spitting up foam  every few minutes. She states that this has been going on since the surgery. Spoke with Pati Duran and she stated that this is called the foamies after gastric bypass. Reminded patient to get her 64 oz of fluids in 24 hours and reminded her of the 20 rule. Verbalized understanding. Call light within reach.

## 2024-04-09 NOTE — TELEPHONE ENCOUNTER
Call placed to patient. LVM requesting call back. Provided call back number.   Received call back from daughter. Patient will can arrive by 9:15 am today for infusion center appointment. P

## 2024-04-10 RX ORDER — PALONOSETRON 0.05 MG/ML
0.25 INJECTION, SOLUTION INTRAVENOUS ONCE
Status: CANCELLED
Start: 2024-04-12

## 2024-04-12 ENCOUNTER — HOSPITAL ENCOUNTER (OUTPATIENT)
Dept: INFUSION THERAPY | Age: 44
Setting detail: INFUSION SERIES
Discharge: HOME | End: 2024-04-12
Payer: COMMERCIAL

## 2024-04-12 DIAGNOSIS — R11.2 NAUSEA AND VOMITING, UNSPECIFIED VOMITING TYPE: Primary | ICD-10-CM

## 2024-04-12 PROCEDURE — 96375 TX/PRO/DX INJ NEW DRUG ADDON: CPT

## 2024-04-12 PROCEDURE — 96361 HYDRATE IV INFUSION ADD-ON: CPT

## 2024-04-12 PROCEDURE — 96360 HYDRATION IV INFUSION INIT: CPT

## 2024-04-12 PROCEDURE — 6360000002 HC RX W HCPCS: Performed by: SURGERY

## 2024-04-12 PROCEDURE — 2580000003 HC RX 258: Performed by: SURGERY

## 2024-04-12 RX ORDER — PALONOSETRON 0.05 MG/ML
0.25 INJECTION, SOLUTION INTRAVENOUS ONCE
Status: CANCELLED
Start: 2024-04-12 | End: 2024-04-12

## 2024-04-12 RX ORDER — PALONOSETRON 0.05 MG/ML
0.25 INJECTION, SOLUTION INTRAVENOUS ONCE
Status: COMPLETED | OUTPATIENT
Start: 2024-04-12 | End: 2024-04-12

## 2024-04-12 RX ADMIN — PALONOSETRON 0.25 MG: 0.05 INJECTION, SOLUTION INTRAVENOUS at 14:20

## 2024-04-12 RX ADMIN — POTASSIUM CHLORIDE: 2 INJECTION, SOLUTION, CONCENTRATE INTRAVENOUS at 14:23

## 2024-04-12 RX ADMIN — POTASSIUM CHLORIDE: 2 INJECTION, SOLUTION, CONCENTRATE INTRAVENOUS at 15:26

## 2024-04-12 NOTE — PROGRESS NOTES
Premed given per Kirsten, and also started first bag of hydration. Now second hydration initiated. Call light in reach.  Pt up to [facility] independently.

## 2024-04-12 NOTE — PROGRESS NOTES
Pt up to [facility]P independently and left unit.  Pt declined AVS.  Pt states has appt with Doctor next week. All equipment used in the care for this patient has been cleaned.

## 2024-04-12 NOTE — PROGRESS NOTES
Pt arrives to [facility] ambulatory for  hydration infusion.  Pt Is made comfortable in chair.

## 2024-04-22 ENCOUNTER — OFFICE VISIT (OUTPATIENT)
Dept: GASTROENTEROLOGY | Age: 44
End: 2024-04-22
Payer: COMMERCIAL

## 2024-04-22 VITALS
DIASTOLIC BLOOD PRESSURE: 70 MMHG | HEART RATE: 91 BPM | SYSTOLIC BLOOD PRESSURE: 116 MMHG | WEIGHT: 178 LBS | BODY MASS INDEX: 32.56 KG/M2 | OXYGEN SATURATION: 98 %

## 2024-04-22 DIAGNOSIS — D18.03 LIVER HEMANGIOMA: Primary | ICD-10-CM

## 2024-04-22 PROCEDURE — G8427 DOCREV CUR MEDS BY ELIG CLIN: HCPCS | Performed by: INTERNAL MEDICINE

## 2024-04-22 PROCEDURE — 1036F TOBACCO NON-USER: CPT | Performed by: INTERNAL MEDICINE

## 2024-04-22 PROCEDURE — G8417 CALC BMI ABV UP PARAM F/U: HCPCS | Performed by: INTERNAL MEDICINE

## 2024-04-22 PROCEDURE — 99213 OFFICE O/P EST LOW 20 MIN: CPT | Performed by: INTERNAL MEDICINE

## 2024-04-22 RX ORDER — APREPITANT 125 MG/1
CAPSULE ORAL
COMMUNITY
Start: 2024-04-05 | End: 2024-04-26

## 2024-04-22 ASSESSMENT — ENCOUNTER SYMPTOMS
TROUBLE SWALLOWING: 0
CHEST TIGHTNESS: 0
VOMITING: 0
BLOOD IN STOOL: 0
EYE PAIN: 0
RECTAL PAIN: 0
NAUSEA: 0
CONSTIPATION: 0
WHEEZING: 0
SHORTNESS OF BREATH: 0
VOICE CHANGE: 0
ABDOMINAL DISTENTION: 0
EYE REDNESS: 0
PHOTOPHOBIA: 0
DIARRHEA: 0
COLOR CHANGE: 0
ABDOMINAL PAIN: 0

## 2024-04-22 NOTE — PROGRESS NOTES
Subjective:      Patient ID: Idalia Sarmiento is a 43 y.o. female who presents today for:  Chief Complaint   Patient presents with    Follow-up       HPI  Patient came in today for follow-up visit.  Since last visit, patient had an MRI that showed hepatic hemangioma and splenic cyst.  She also Geovanna-en-Y gastric bypass.  She mentioned that she was having nausea and vomiting and episodes of hematemesis afterward for which she has been following with bariatric surgery.  No overt bleeding reported at this time.  Denies abdominal pain denies diarrhea or constipation.  Patient came in today to discuss findings and further plan of care  Prior note  This is a very pleasant 43-year-old who came in today for further evaluation management. Patient mentioned that she had an imaging of the liver and was found to have liver lesion and subsequently referred to hepatology for further assessment. Patient also reports lower abdominal pain and point toward the right lower quadrant. Patient had CT scan that showed liver cyst in addition was found to have subtle 3.9 cm lesion in the right hepatic lobe additionally was found to have 3.8 right adnexal cyst. Patient had liver ultrasound and also was referred to gynecology for further assessment. Patient also had liver ultrasound, the lesion which was reported as exophytic 4.9 cm extending from inferior margin of the right hepatic lobe was seen previously in July 2013 however increased in size comparing to previous. In fact in 2013 the lesion was around 2.7 cm. Patient denies of jaundice or easy bruising admission related to liver condition. Patient has not had colonoscopy. She mentioned that she is scheduled for bariatric surgery. Patient does have BMI of 36 class II obesity with diabetes mellitus and polycystic ovarian syndrome. No diarrhea, constipation reported. No melena or hematochezia. Patient came in today for further evaluation management   Past Medical History:   Diagnosis Date

## 2024-04-26 ENCOUNTER — OFFICE VISIT (OUTPATIENT)
Dept: BARIATRICS/WEIGHT MGMT | Age: 44
End: 2024-04-26

## 2024-04-26 VITALS
BODY MASS INDEX: 31.69 KG/M2 | SYSTOLIC BLOOD PRESSURE: 100 MMHG | WEIGHT: 172.2 LBS | OXYGEN SATURATION: 97 % | HEIGHT: 62 IN | DIASTOLIC BLOOD PRESSURE: 72 MMHG | HEART RATE: 86 BPM

## 2024-04-26 DIAGNOSIS — Z98.84 S/P GASTRIC BYPASS: Primary | ICD-10-CM

## 2024-04-26 PROCEDURE — 99024 POSTOP FOLLOW-UP VISIT: CPT | Performed by: SURGERY

## 2024-04-26 NOTE — PROGRESS NOTES
Idalia returns almost 2-month status post robotic Geovanna-en-Y gastric bypass today.  She is doing much better with no regurgitation of liquids or solids.  She is tolerating greater than 70 ounces of liquid a day and greater than 70 to 80 g of protein per day.    General: No acute distress  HEENT: P PERRLA, no scleral icterus.  Trachea midline.  Thoracic: Clear to auscultation bilaterally.  Cardiac: Regular rate and rhythm with no rubs clicks or murmurs.  Abdomen: Nondistended, nontender.  No fluid shift or caput medusae.  No hepatosplenomegaly.  Extremities: No clubbing cyanosis or edema.  Neurologic: No gross motor or sensory defects.      Assessment and plan: As she is progressing well with her diet and tolerating multivitamin chewable and calcium citrate, we will follow-up in 2 months with labs.    Salazar Chan MD, FACS, [facility]

## 2024-05-16 PROBLEM — E66.811 CLASS 1 OBESITY DUE TO EXCESS CALORIES WITHOUT SERIOUS COMORBIDITY WITH BODY MASS INDEX (BMI) OF 31.0 TO 31.9 IN ADULT: Status: ACTIVE | Noted: 2024-01-11

## 2024-05-16 PROBLEM — G89.29 CHRONIC PAIN OF RIGHT KNEE: Status: ACTIVE | Noted: 2019-05-16

## 2024-05-16 PROBLEM — R73.09 ABNORMAL GLUCOSE: Chronic | Status: RESOLVED | Noted: 2019-03-27 | Resolved: 2024-05-16

## 2024-05-16 PROBLEM — R42 LIGHTHEADED: Status: RESOLVED | Noted: 2024-03-07 | Resolved: 2024-05-16

## 2024-05-16 PROBLEM — R11.10 VOMITING: Status: RESOLVED | Noted: 2024-03-06 | Resolved: 2024-05-16

## 2024-05-16 PROBLEM — E66.01 SEVERE OBESITY (BMI 35.0-39.9) WITH COMORBIDITY (HCC): Status: RESOLVED | Noted: 2024-03-04 | Resolved: 2024-05-16

## 2024-05-16 PROBLEM — E66.01 CLASS 2 SEVERE OBESITY DUE TO EXCESS CALORIES WITH SERIOUS COMORBIDITY AND BODY MASS INDEX (BMI) OF 39.0 TO 39.9 IN ADULT (HCC): Chronic | Status: RESOLVED | Noted: 2017-12-22 | Resolved: 2024-05-16

## 2024-05-16 PROBLEM — R11.2 NAUSEA AND VOMITING: Status: RESOLVED | Noted: 2024-03-06 | Resolved: 2024-05-16

## 2024-05-16 PROBLEM — E66.812 CLASS 2 SEVERE OBESITY DUE TO EXCESS CALORIES WITH SERIOUS COMORBIDITY AND BODY MASS INDEX (BMI) OF 39.0 TO 39.9 IN ADULT: Chronic | Status: RESOLVED | Noted: 2017-12-22 | Resolved: 2024-05-16

## 2024-05-16 PROBLEM — E66.09 CLASS 1 OBESITY DUE TO EXCESS CALORIES WITHOUT SERIOUS COMORBIDITY WITH BODY MASS INDEX (BMI) OF 31.0 TO 31.9 IN ADULT: Status: ACTIVE | Noted: 2024-01-11

## 2024-05-16 PROBLEM — M25.561 CHRONIC PAIN OF RIGHT KNEE: Status: ACTIVE | Noted: 2019-05-16

## 2024-05-16 PROBLEM — E66.01 MORBID (SEVERE) OBESITY DUE TO EXCESS CALORIES (HCC): Status: RESOLVED | Noted: 2024-01-30 | Resolved: 2024-05-16

## 2024-05-16 PROBLEM — R73.09 OTHER ABNORMAL GLUCOSE: Status: RESOLVED | Noted: 2024-01-30 | Resolved: 2024-05-16

## 2024-05-16 SDOH — ECONOMIC STABILITY: FOOD INSECURITY: WITHIN THE PAST 12 MONTHS, YOU WORRIED THAT YOUR FOOD WOULD RUN OUT BEFORE YOU GOT MONEY TO BUY MORE.: NEVER TRUE

## 2024-05-16 SDOH — ECONOMIC STABILITY: FOOD INSECURITY: WITHIN THE PAST 12 MONTHS, THE FOOD YOU BOUGHT JUST DIDN'T LAST AND YOU DIDN'T HAVE MONEY TO GET MORE.: NEVER TRUE

## 2024-05-16 SDOH — ECONOMIC STABILITY: HOUSING INSECURITY
IN THE LAST 12 MONTHS, WAS THERE A TIME WHEN YOU DID NOT HAVE A STEADY PLACE TO SLEEP OR SLEPT IN A SHELTER (INCLUDING NOW)?: NO

## 2024-05-16 SDOH — ECONOMIC STABILITY: INCOME INSECURITY: HOW HARD IS IT FOR YOU TO PAY FOR THE VERY BASICS LIKE FOOD, HOUSING, MEDICAL CARE, AND HEATING?: NOT HARD AT ALL

## 2024-05-16 NOTE — PROGRESS NOTES
Subjective  Idalia Sarmiento 1980 is a 43 y.o. female who presents today with:  Chief Complaint   Patient presents with    Follow-up     Follow up  Pt states no issues       HPI  S/p Arina-en-Y Bypass  - Improving. Getting to goal weight.   - no other concerns.     Review of Systems   Constitutional:  Negative for activity change, appetite change, chills and fever.   HENT:  Negative for congestion, drooling, sinus pressure, sinus pain and sore throat.    Eyes:  Negative for visual disturbance.   Respiratory:  Negative for cough, chest tightness and shortness of breath.    Cardiovascular:  Negative for chest pain.   Gastrointestinal:  Negative for abdominal pain, diarrhea, nausea and vomiting.   Endocrine: Negative for cold intolerance.   Genitourinary:  Negative for dysuria, flank pain, frequency and hematuria.   Musculoskeletal:  Negative for arthralgias and back pain.   Skin:  Negative for rash.   Allergic/Immunologic: Negative for food allergies.   Neurological:  Negative for weakness, light-headedness, numbness and headaches.   Hematological:  Does not bruise/bleed easily.       Past Medical History:   Diagnosis Date    Chiari I malformation (HCC)     Eczema     Migraines 2010    PCO (polycystic ovaries)     S/P complete hysterectomy 2012    Dr Dakotah Cervantes, atypical endocervical cells    Strain of musc/tend the rotator cuff of right shoulder, subs 12/12/2019    Type II diabetes mellitus, uncontrolled     Dr Benz     Past Surgical History:   Procedure Laterality Date    HYSTERECTOMY, TOTAL ABDOMINAL (CERVIX REMOVED)  01/01/2012    Dr Dakotah Cervantes, cervical atypia    LAPAROSCOPIC APPENDECTOMY      ARINA-EN-Y GASTRIC BYPASS N/A 3/4/2024    GASTRIC BYPASS ARINA-EN-Y LAPAROSCOPIC/ROBOTIC, modifier 22 performed by Jl Chan MD at [facility]    UPPER GASTROINTESTINAL ENDOSCOPY N/A 06/14/2023    EGD BIOPSY performed by Jl Chan MD at [facility]     Social History     Socioeconomic  eMERGENCY dEPARTMENT eNCOUnter      CHIEF COMPLAINT    Chief Complaint   Patient presents with   • Neurologic Problem   • Numbness       HPI    Brian Lopez is a 54 year old female who presents for evaluation of left-sided numbness and tingling which started an hour ago, this patient has history of remote stroke, with occasional similar symptoms on and off, she has been evaluated 2 or 3 times over the past year, has had complete neurologic evaluation: MRIs which did not show new lesions. She is on Plavix, she used to be on aspirin but not anymore, and she was just recently diagnosed with pheochromocytoma, and she has been few times in ER with hypertensive emergency/urgency.  By the time patient presented to ER, most of her symptoms have resolved.    ALLERGIES    ALLERGIES:   Allergen Reactions   • Latex   (Environmental) PRURITUS   • Sulfa Antibiotics PRURITUS       CURRENT MEDICATIONS    Current Facility-Administered Medications   Medication Dose Route Frequency Provider Last Rate Last Dose   • sodium chloride (PF) 0.9 % injection 2 mL  2 mL Injection 2 times per day Alcon Avila MD       • sodium chloride (PF) 0.9 % injection 2 mL  2 mL Injection PRN Alcon Avila MD         Current Outpatient Medications   Medication Sig Dispense Refill   • amLODIPine (NORVASC) 5 MG tablet Take 1 tablet by mouth 2 times daily. 180 tablet 0   • glipiZIDE (GLUCOTROL) 10 MG tablet TAKE 2 TABLETS (20MG) BY MOUTH TWICE DAILY BEFORE MEALS 360 tablet 0   • clopidogrel (PLAVIX) 75 MG tablet Take 1 tablet by mouth daily. 90 tablet 0   • losartan (COZAAR) 50 MG tablet Take 1 tablet by mouth daily. 90 tablet 0   • carvedilol (COREG) 12.5 MG tablet Take 1 tablet by mouth 2 times daily (with meals). 180 tablet 0   • albuterol 108 (90 Base) MCG/ACT inhaler Inhale 2 puffs into the lungs every 4 hours as needed for Shortness of Breath or Wheezing. 8.5 g 2   • escitalopram (LEXAPRO) 20 MG tablet Take 1 tablet by mouth daily. 90 tablet 0   •  pantoprazole (PROTONIX) 40 MG tablet Take 1 tablet by mouth daily (before breakfast). 90 tablet 0   • levetiracetam (KEPPRA) 1000 MG tablet Take 1 tablet by mouth 2 times daily. 60 tablet 5   • cetirizine (ZYRTEC) 10 MG tablet Take 1 tablet by mouth daily. 30 tablet 2   • meclizine HCl (ANTIVERT) 25 MG tablet Take 1 tablet by mouth 3 times daily as needed for Dizziness. 30 tablet 0   • cloNIDine (CATAPRES) 0.2 MG tablet Take 1 tablet by mouth 3 times daily. 90 tablet 3   • sitaGLIPtin (JANUVIA) 100 MG tablet Take 1 tablet by mouth daily. 30 tablet 2   • fluticasone (FLONASE) 50 MCG/ACT nasal spray Spray 1 spray in each nostril 2 times daily. 16 g 11   • Ascorbic Acid (VITAMIN C PO) Take 1 tablet by mouth daily.     • insulin glargine (LANTUS) 100 UNIT/ML injectable solution Inject 28 Units into the skin nightly. 10 mL 4   • Insulin Syringe-Needle U-100 31G X 5/16\" 1 ML Misc Use to administer insulin once daily 100 each 1   • Insulin Pen Needle (BD PEN NEEDLE STEW U/F) 32G X 4 MM Misc Use to inject insulin once  daily. Remove needle cover(s) to expose needle before injecting. 100 each 1   • blood glucose test strip Patient to test twice daily. Dx: E11.22 Meter: per insurance coverage or patient provided name 200 each 5   • budesonide-formoterol (SYMBICORT) 160-4.5 MCG/ACT inhaler Inhale 2 puffs into the lungs 2 times daily. 10.2 g 11   • azelastine (ASTELIN) 0.1 % nasal spray Spray 1-2 sprays in each nostril 2 times daily. Use in each nostril as directed 30 mL 11   • atorvastatin (LIPITOR) 80 MG tablet Take 1 tablet by mouth daily. 90 tablet 1   • potassium chloride 10 MEQ CR tablet Take 10 mEq by mouth daily.         PAST MEDICAL HISTORY    Past Medical History:   Diagnosis Date   • Acute pain of left shoulder 11/13/2018   • Anxiety disorder due to medical condition 3/14/2018   • Asthma    • Essential (primary) hypertension    • Gastroesophageal reflux disease    • Head ache    • Hypercholesteremia    • Seizure  disorder as sequela of cerebrovascular accident (CMS/HCC) 11/24/2016    discharged by neurology   • Sleep apnea    • Stroke (CMS/HCC) 11/24/2016    intracranial hemorrhage   • Thyroid nodule 10/3/2018   • Type 2 diabetes mellitus with stage 3 chronic kidney disease, with long-term current use of insulin (CMS/HCC) 1998    insulin started 2017       SURGICAL HISTORY    Past Surgical History:   Procedure Laterality Date   • Bunionectomy Left 01/05/2018    Dr. Fallon       SOCIAL HISTORY    Social History     Socioeconomic History   • Marital status: Single     Spouse name: None   • Number of children: None   • Years of education: None   • Highest education level: None   Social Needs   • Financial resource strain: None   • Food insecurity - worry: None   • Food insecurity - inability: None   • Transportation needs - medical: None   • Transportation needs - non-medical: None   Occupational History   • Occupation: disability   Tobacco Use   • Smoking status: Never Smoker   • Smokeless tobacco: Never Used   Substance and Sexual Activity   • Alcohol use: No     Comment: last drink in 3 years   • Drug use: No   • Sexual activity: No     Partners: Male     Birth control/protection: Post-menopausal   Other Topics Concern   • None   Social History Narrative   • None       FAMILY HISTORY    Family History   Problem Relation Age of Onset   • Diabetes Mother    • Heart disease Mother    • Kidney disease Mother    • Hypertension Mother    • Myocardial Infarction Mother    • Diabetes Sister    • Hypertension Sister    • Diabetes Brother    • Hypertension Brother    • Diabetes Sister    • Asthma Son    • Allergic Rhinitis Neg Hx    • Eczema Neg Hx    • Urticaria Neg Hx        REVIEW OF SYSTEMS    Constitutional:  Denies fever or chills.   Eyes:  Denies change in visual acuity.   HENT:  Denies nasal congestion or sore throat.   Respiratory:  Denies cough or shortness of breath.   Cardiovascular:  Denies chest pain or edema.   GI:   Denies abdominal pain, nausea, vomiting, bloody stools or diarrhea.   :  Denies dysuria.   Musculoskeletal:  Denies back pain or joint pain.   Integument:  Denies rash.   Neurologic: See history of present illness  Psychiatric:  Denies depression or anxiety.     PHYSICAL EXAM    ED Triage Vitals   BP 12/13/18 1733 145/67   Pulse 12/13/18 1729 78   Resp 12/13/18 1729 20   Temp 12/13/18 1733 98.8 °F (37.1 °C)   SpO2 12/13/18 1729 98 %         Constitutional:  Well developed, no acute distress, anxious  Eyes:  PERRL, EOMI,   Head: Atraumatic, normocephalic  ENT:  oropharynx moist,  Neck:  normal range of motion,   Respiratory:  No respiratory distress, normal breath sounds, no rales, no wheezing.no accessory muscle use   Cardiovascular:  Normal rate, normal rhythm, ,no lower extremity edema.     Pulses: DP/PT 2/2, radial 2/2  GI:  Soft, nondistended, normal bowel sounds, non-tender, no mass, no rebound, no guarding.   :  No costovertebral angle tenderness.   Musculoskeletal:  Neck: normal ROM   Extremities:  No tenderness or deformities in extremities x 4  Integument:  Well hydrated, no rash.   Neurologic:  Alert & oriented x 3;  Strength 5/5 in extremities x 4.   Psychiatric:  Speech and behavior appropriate.     EKG INTERPRETATION:  Time performed: 5:37 PM  Interpreted by: ED physician  Rhythm: normal sinus   Rate: normal at 73 per minute  Axis: normal  Ectopy: none  Conduction: normal  ST Segments: Nonspecific changes  T Waves: Nonspecific changes  Q Waves: none    Clinical Impression: Nonspecific EKG, pending cardiology review      RADIOLOGY    CT Angio Head and Neck Level 1 ([place], [place], [place], [facility], [place], [facility], [place] only)   Final Result   IMPRESSION:      1.  Chronic right M1 occlusion, with subtle wispy collaterals raising   possibility of underlying moyamoya physiology.   2.  CTA of the head and neck is otherwise unremarkable.   3.  Old right MCA territory infarct.       Results were discussed with the patient's emergency room physician, Dr. Alcon Avila, on 12/13/2018 6:22 PM.           Images personally reviewed and interpreted by me, no bleeding, chronic changes, awaiting radiology report    LABS    Results for orders placed or performed during the hospital encounter of 12/13/18   CBC & Auto Differential   Result Value    WBC 10.2    RBC 4.28    HGB 11.1 (L)    HCT 35.7 (L)    MCV 83.4    MCH 25.9 (L)    MCHC 31.1 (L)    RDW-CV 13.5        NRBC 0    DIFF TYPE AUTOMATED DIFFERENTIAL    Neutrophil 77    LYMPH 14    MONO 5    EOSIN 3    BASO 0    Percent Immature Granuloctyes 1    Absolute Neutrophil 7.9 (H)    Absolute Lymph 1.4    Absolute Mono 0.6    Absolute Eos 0.3    Absolute Baso 0.0    Absolute Immature Granulocytes 0.1   Prothrombin Time   Result Value    PROTIME 11.6    INR 1.1     Comment: INR Therapeutic Range: 2.0 to 3.0 (2.5 to 3.5 recommended for recurrent thrombotic episodes and mechanical prosthetic heart valves.)    Partial Thromboplastin Time   Result Value    PTT 43 (H)     Comment: PTT  Therapeutic Range:  45-70 seconds.   Comprehensive Metabolic Panel   Result Value    Sodium 140    Potassium 4.1    Chloride 104    Carbon Dioxide 25    Anion Gap 15    Glucose 299 (H)    BUN 12    Creatinine 1.04 (H)    GFR Estimate,  70     Comment: eGFR 60 - 89 mL/min/1.73m2 = Mild decrease in kidney function.    GFR Estimate, Non  60     Comment: eGFR 60 - 89 mL/min/1.73m2 = Mild decrease in kidney function.    BUN/Creatinine Ratio 12    CALCIUM 8.6    TOTAL BILIRUBIN 0.2    AST/SGOT 15    ALT/SGPT 21    ALK PHOSPHATASE 86    TOTAL PROTEIN 7.1    Albumin 3.7    GLOBULIN 3.4    A/G Ratio, Serum 1.1   Troponin I Ultra Sensitive   Result Value    TROPONIN I <0.02   Metered blood glucose   Result Value    Glucose Bedside  (H)         MEDS:  Current Facility-Administered Medications   Medication   • sodium chloride (PF) 0.9 %  injection 2 mL   • sodium chloride (PF) 0.9 % injection 2 mL     Current Outpatient Medications   Medication Sig   • amLODIPine (NORVASC) 5 MG tablet Take 1 tablet by mouth 2 times daily.   • glipiZIDE (GLUCOTROL) 10 MG tablet TAKE 2 TABLETS (20MG) BY MOUTH TWICE DAILY BEFORE MEALS   • clopidogrel (PLAVIX) 75 MG tablet Take 1 tablet by mouth daily.   • losartan (COZAAR) 50 MG tablet Take 1 tablet by mouth daily.   • carvedilol (COREG) 12.5 MG tablet Take 1 tablet by mouth 2 times daily (with meals).   • albuterol 108 (90 Base) MCG/ACT inhaler Inhale 2 puffs into the lungs every 4 hours as needed for Shortness of Breath or Wheezing.   • escitalopram (LEXAPRO) 20 MG tablet Take 1 tablet by mouth daily.   • pantoprazole (PROTONIX) 40 MG tablet Take 1 tablet by mouth daily (before breakfast).   • levetiracetam (KEPPRA) 1000 MG tablet Take 1 tablet by mouth 2 times daily.   • cetirizine (ZYRTEC) 10 MG tablet Take 1 tablet by mouth daily.   • meclizine HCl (ANTIVERT) 25 MG tablet Take 1 tablet by mouth 3 times daily as needed for Dizziness.   • cloNIDine (CATAPRES) 0.2 MG tablet Take 1 tablet by mouth 3 times daily.   • sitaGLIPtin (JANUVIA) 100 MG tablet Take 1 tablet by mouth daily.   • fluticasone (FLONASE) 50 MCG/ACT nasal spray Spray 1 spray in each nostril 2 times daily.   • Ascorbic Acid (VITAMIN C PO) Take 1 tablet by mouth daily.   • insulin glargine (LANTUS) 100 UNIT/ML injectable solution Inject 28 Units into the skin nightly.   • Insulin Syringe-Needle U-100 31G X 5/16\" 1 ML Misc Use to administer insulin once daily   • Insulin Pen Needle (BD PEN NEEDLE STEW U/F) 32G X 4 MM Misc Use to inject insulin once  daily. Remove needle cover(s) to expose needle before injecting.   • blood glucose test strip Patient to test twice daily. Dx: E11.22 Meter: per insurance coverage or patient provided name   • budesonide-formoterol (SYMBICORT) 160-4.5 MCG/ACT inhaler Inhale 2 puffs into the lungs 2 times daily.   •  azelastine (ASTELIN) 0.1 % nasal spray Spray 1-2 sprays in each nostril 2 times daily. Use in each nostril as directed   • atorvastatin (LIPITOR) 80 MG tablet Take 1 tablet by mouth daily.   • potassium chloride 10 MEQ CR tablet Take 10 mEq by mouth daily.       ED COURSE & MEDICAL DECISION MAKING      I have reviewed the patient's past medical/surgical/social and family history.     Stroke alert called, CT brain and CT angiogram negative for any acute findings, NIH is 0, labs reviewed as above and normal, EKG without acute changes, patient's blood pressure is well-controlled, she's not tachycardic.    I discussed all the findings with the patient, with her family, with Dr. Milian, neurology, and he knows this patient very well, we will give her full dose of aspirin and she will be on baby aspirin starting tomorrow in addition to her Plavix and other medications. She needs to follow-up with primary physician, and with her neurologist         Summary of your Discharge Medications      You have not been prescribed any medications.         FINAL IMPRESSION      1. Numbness  2. History of pheochromocytoma       Alcon Avila MD  12/13/18 1051

## 2024-05-17 ENCOUNTER — OFFICE VISIT (OUTPATIENT)
Dept: FAMILY MEDICINE CLINIC | Age: 44
End: 2024-05-17
Payer: COMMERCIAL

## 2024-05-17 VITALS
HEIGHT: 62 IN | RESPIRATION RATE: 16 BRPM | WEIGHT: 168.21 LBS | SYSTOLIC BLOOD PRESSURE: 110 MMHG | DIASTOLIC BLOOD PRESSURE: 60 MMHG | OXYGEN SATURATION: 100 % | HEART RATE: 82 BPM | BODY MASS INDEX: 30.95 KG/M2

## 2024-05-17 DIAGNOSIS — G43.009 MIGRAINE WITHOUT AURA AND WITHOUT STATUS MIGRAINOSUS, NOT INTRACTABLE: Primary | ICD-10-CM

## 2024-05-17 DIAGNOSIS — Z98.84 HISTORY OF ROUX-EN-Y GASTRIC BYPASS: ICD-10-CM

## 2024-05-17 PROCEDURE — G8427 DOCREV CUR MEDS BY ELIG CLIN: HCPCS | Performed by: PHYSICIAN ASSISTANT

## 2024-05-17 PROCEDURE — 1036F TOBACCO NON-USER: CPT | Performed by: PHYSICIAN ASSISTANT

## 2024-05-17 PROCEDURE — 99212 OFFICE O/P EST SF 10 MIN: CPT | Performed by: PHYSICIAN ASSISTANT

## 2024-05-17 PROCEDURE — G8417 CALC BMI ABV UP PARAM F/U: HCPCS | Performed by: PHYSICIAN ASSISTANT

## 2024-05-17 SDOH — ECONOMIC STABILITY: FOOD INSECURITY: WITHIN THE PAST 12 MONTHS, YOU WORRIED THAT YOUR FOOD WOULD RUN OUT BEFORE YOU GOT MONEY TO BUY MORE.: OFTEN TRUE

## 2024-05-17 SDOH — ECONOMIC STABILITY: FOOD INSECURITY: WITHIN THE PAST 12 MONTHS, THE FOOD YOU BOUGHT JUST DIDN'T LAST AND YOU DIDN'T HAVE MONEY TO GET MORE.: OFTEN TRUE

## 2024-05-17 SDOH — ECONOMIC STABILITY: HOUSING INSECURITY
IN THE LAST 12 MONTHS, WAS THERE A TIME WHEN YOU DID NOT HAVE A STEADY PLACE TO SLEEP OR SLEPT IN A SHELTER (INCLUDING NOW)?: YES

## 2024-05-17 SDOH — ECONOMIC STABILITY: INCOME INSECURITY: HOW HARD IS IT FOR YOU TO PAY FOR THE VERY BASICS LIKE FOOD, HOUSING, MEDICAL CARE, AND HEATING?: VERY HARD

## 2024-05-17 ASSESSMENT — ENCOUNTER SYMPTOMS
CHEST TIGHTNESS: 0
DIARRHEA: 0
VOMITING: 0
SINUS PRESSURE: 0
COUGH: 0
SHORTNESS OF BREATH: 0
SORE THROAT: 0
SINUS PAIN: 0
ABDOMINAL PAIN: 0

## 2024-05-17 ASSESSMENT — VISUAL ACUITY: OU: 1

## 2024-05-17 NOTE — PATIENT INSTRUCTIONS
[email or web address]      [facility] Charities  What they offer: Homeless support/shelter.   Phone number: 121.725.1754    Coordinated Entry:   What they offer: a system that allows for coordinated entry into a local homeless services system, as well as coordinated movement within and ultimately exit from the system.  Phone Number: 784.187.8578  Website: [email or web address]    HOUSING:     [facility] Housing:   What they Offer: providing safe, decent, and affordable housing for the residents of [facility].  Phone Number: 157.605.6808    Website: http://[email or web address].Jigsaw Meeting    [facility] [place] Services:   What they offer: Help veterans pay for rent   Phone Number: 864.110.6190  Website: https://www.Penumbra           [place] Transportation Resources*  (Call 211 if need more resources.)     [employer] [facility]:  What they offer:  Transportation to [facility]’s, 24 hrs. a day, 7 days a week. You must call for pricing and private pay by credit card.   Phone Number: 489.332.1276  Website: https://www.Club [employer]cm/transportationoptions    [facility] Transportation:  What they offer: Personal care service to airports, [employer] concerts, shopping malls and more. Hours are based on customer’s needs and cost depends on riders’ location and where they are going.  Phone Number: 826.766.6709  Website: http://www.YourTeamOnline    [facility] Transportation:  What they offer: An unassisted curb to curb service for [facility] older adult residents. Rides to Senior center activities, grocery shopping, pharmacies, banking & beauty salons in [facility], area malls and voting.  Cost is free.   Phone Number: 387.395.3493  Website: [email or web address] [employer] [place]/159/[email or web address]-Center    [facility] Transportation:  What they offer:  Free transportation on Monday’s, Tuesday’s Wednesday’s and Thursdays for local shopping, banking,

## 2024-07-01 DIAGNOSIS — Z98.84 S/P GASTRIC BYPASS: ICD-10-CM

## 2024-07-01 LAB
ALBUMIN SERPL-MCNC: 3.9 G/DL (ref 3.5–4.6)
ERYTHROCYTE [DISTWIDTH] IN BLOOD BY AUTOMATED COUNT: 14.6 % (ref 11.5–14.5)
HCT VFR BLD AUTO: 37.4 % (ref 37–47)
HGB BLD-MCNC: 12 G/DL (ref 12–16)
MCH RBC QN AUTO: 29.9 PG (ref 27–31.3)
MCHC RBC AUTO-ENTMCNC: 32.1 % (ref 33–37)
MCV RBC AUTO: 93 FL (ref 79.4–94.8)
PLATELET # BLD AUTO: 309 K/UL (ref 130–400)
RBC # BLD AUTO: 4.02 M/UL (ref 4.2–5.4)
WBC # BLD AUTO: 5.9 K/UL (ref 4.8–10.8)

## 2024-07-02 LAB
COMMENT: NORMAL
IRON % SATURATION: 30 % (ref 20–55)
IRON: 78 UG/DL (ref 37–145)
MISCELLANEOUS LAB TEST RESULT: NORMAL
TOTAL IRON BINDING CAPACITY: 260 UG/DL (ref 250–450)
UNSATURATED IRON BINDING CAPACITY: 182 UG/DL (ref 112–347)
VITAMIN B-12: 446 PG/ML (ref 232–1245)
VITAMIN D 25-HYDROXY: <6 NG/ML (ref 30–100)

## 2024-07-03 ENCOUNTER — OFFICE VISIT (OUTPATIENT)
Dept: BARIATRICS/WEIGHT MGMT | Age: 44
End: 2024-07-03
Payer: COMMERCIAL

## 2024-07-03 VITALS
SYSTOLIC BLOOD PRESSURE: 118 MMHG | HEART RATE: 63 BPM | DIASTOLIC BLOOD PRESSURE: 64 MMHG | WEIGHT: 164 LBS | BODY MASS INDEX: 30.18 KG/M2 | OXYGEN SATURATION: 99 % | HEIGHT: 62 IN

## 2024-07-03 VITALS — WEIGHT: 164.02 LBS | BODY MASS INDEX: 30.18 KG/M2 | HEIGHT: 62 IN

## 2024-07-03 DIAGNOSIS — E55.9 VITAMIN D DEFICIENCY: Primary | ICD-10-CM

## 2024-07-03 DIAGNOSIS — Z71.3 DIETARY COUNSELING AND SURVEILLANCE: ICD-10-CM

## 2024-07-03 DIAGNOSIS — Z98.84 S/P GASTRIC BYPASS: ICD-10-CM

## 2024-07-03 DIAGNOSIS — E66.9 OBESITY (BMI 30-39.9): Primary | ICD-10-CM

## 2024-07-03 LAB
CA-I ADJ PH7.4 SERPL-SCNC: 1.25 MMOL/L (ref 1.09–1.3)
CA-I SERPL ISE-SCNC: 1.23 MMOL/L (ref 1.09–1.3)

## 2024-07-03 PROCEDURE — G8417 CALC BMI ABV UP PARAM F/U: HCPCS | Performed by: SURGERY

## 2024-07-03 PROCEDURE — 99215 OFFICE O/P EST HI 40 MIN: CPT | Performed by: SURGERY

## 2024-07-03 PROCEDURE — G8427 DOCREV CUR MEDS BY ELIG CLIN: HCPCS | Performed by: SURGERY

## 2024-07-03 PROCEDURE — 1036F TOBACCO NON-USER: CPT | Performed by: SURGERY

## 2024-07-03 PROCEDURE — G8417 CALC BMI ABV UP PARAM F/U: HCPCS | Performed by: DIETITIAN, REGISTERED

## 2024-07-03 PROCEDURE — G8428 CUR MEDS NOT DOCUMENT: HCPCS | Performed by: DIETITIAN, REGISTERED

## 2024-07-03 PROCEDURE — 97803 MED NUTRITION INDIV SUBSEQ: CPT | Performed by: DIETITIAN, REGISTERED

## 2024-07-03 RX ORDER — ERGOCALCIFEROL 1.25 MG/1
50000 CAPSULE ORAL WEEKLY
Qty: 12 CAPSULE | Refills: 1 | Status: SHIPPED | OUTPATIENT
Start: 2024-07-03

## 2024-07-03 NOTE — PATIENT INSTRUCTIONS
Follow up in 2 months for your 6 month post op visit.        THE BIG FOUR GUIDELINES:    1.  Count calories!  People count calories eat less.  Use the daily plate or other apps for your smart phone or computer.  The more you count the more of an expert you will become and will get easier and easier.  If you are trying to lose weight and exercising a lot, keep calories to around the thousand to 1200 a day.  If you are not exercising consistently try to limit them to around 800 a day.    2.  Separate liquids and solids.  Wait 30 minutes to an hour after you eat before drinking liquids.  This will reduce the total amount of food you eat in the meal.    3.  Exercise!  Optimally,  up to 5 hours a week with a combination of cardio and resistance or weight training will dramatically help to keep excess body fat off.  But any bit of exercise makes a big, long-term difference.    4.  Sleep!   Try to get 7 or more hours of sleep a night.  If you have obstructive sleep apnea definitely use your CPAP machine.    THE RULE OF 20'S:  For every meal, chew each bite 20 seconds.  Then put the fork down and wait 20 seconds after you swallow before picking up the fork again.  Each meal should take at least 20 minutes so your brain can register that you are full.    If you are happy with your care, please go  to [employer]grades.com  and/or [employer] and leave a review for Jl Chan MD.  The charitable nature of our practice makes a marketing budget a challenge and your review could help us help more people.

## 2024-07-03 NOTE — PROGRESS NOTES
Post Op Clinic Visit      Idalia Sarmiento  is 4 months months  post-op from Robotic Geovanna en Y gastric bypass.   Abdominal pain has been absent.      Weight:       Wt Readings from Last 1 Encounters:   07/03/24 74.4 kg (164 lb)      BMI:       BMI Readings from Last 1 Encounters:   07/03/24 30.00 kg/m²         Pre-Surgery Weight: 214 lbs  TBW lost: 50 lbs  % EBW lost: 65 %     Patient has lost a total of 50 lbs from start of program.     Labs reviewed: yes, low D, B1 pending     Intolerances/Difficulties: none     Vitamins:  Bariatric MVI with iron  and Calcium Citrate 1000-1500mg/day     Protein goal: met, 100 grams/day per verbal recall, not tracking intake     Fluid goal: met, 64 oz/day     Exercise: walking daily 60 minutes, strength training at gym 2 days/week     Nutrition Diagnosis:  PES: No nutrition diagnosis at this time     Nutrition Intervention:  Education provided to include review of post surgical diet progression, meal timing, protein and fluid intake, vitamins, and exercise.      Nutrition Monitoring and evaluation:  Protein intake: 60-75 grams/day (1.2-1.5gr/kg IBW)  Fluid intake: 64-90oz sugar free, calorie free, caffeine free, non carbonated beverages  Vitamins: Bariatric MVI with iron,  Calcium citrate 1000-1500mg/day in divided doses  Exercise: 45-60 minutes of exercise 5 days/week, including at least 2 days of strength training     Dietitian Plan/Recommendation:   Encourage tracking daily intake at least 3 days/week  Continue daily exercise  Add ad      Physical Exam:    /64   Pulse 63   Ht 1.575 m (5' 2\")   Wt 74.4 kg (164 lb)   LMP  (LMP Unknown) Comment: 2012  SpO2 99%   BMI 30.00 kg/m²     General: No acute distress  HEENT: P PERRLA, no scleral icterus.  Trachea midline.  Thoracic: Clear to auscultation bilaterally.  Cardiac: Regular rate and rhythm with no rubs clicks or murmurs.  Abdomen: The incisions are healing well.There are no hernias    Extremities: No clubbing

## 2024-07-03 NOTE — PROGRESS NOTES
Medical Nutrition Therapy  [facility]    Patient here for her 4 month post op Geovanna en Y gastric bypass.        7/3/2024  Height:   Ht Readings from Last 1 Encounters:   07/03/24 1.575 m (5' 2\")     Weight:   Wt Readings from Last 1 Encounters:   07/03/24 74.4 kg (164 lb)     BMI:   BMI Readings from Last 1 Encounters:   07/03/24 30.00 kg/m²       Pre-Surgery Weight: 214 lbs  TBW lost: 50 lbs  % EBW lost: 65 %    Patient has lost a total of 50 lbs from start of program.    Labs reviewed: yes, low D, B1 pending    Intolerances/Difficulties: none    Vitamins:  Bariatric MVI with iron  and Calcium Citrate 1000-1500mg/day    Protein goal: met, 100 grams/day per verbal recall, not tracking intake    Fluid goal: met, 64 oz/day     Exercise: walking daily 60 minutes, strength training at gym 2 days/week    Nutrition Diagnosis:  PES: No nutrition diagnosis at this time    Nutrition Intervention:  Education provided to include review of post surgical diet progression, meal timing, protein and fluid intake, vitamins, and exercise.     Nutrition Monitoring and evaluation:  Protein intake: 60-75 grams/day (1.2-1.5gr/kg IBW)  Fluid intake: 64-90oz sugar free, calorie free, caffeine free, non carbonated beverages  Vitamins: Bariatric MVI with iron,  Calcium citrate 1000-1500mg/day in divided doses  Exercise: 45-60 minutes of exercise 5 days/week, including at least 2 days of strength training    Plan/Recommendation:   Encourage tracking daily intake at least 3 days/week  Continue daily exercise  Add additional Vitamin D per Dr Chan    Time spent with patient 15 minutes    Follow up per program protocol    Joyce Gonsalez RD

## 2024-07-05 LAB — VIT B1 BLD-MCNC: 67 NMOL/L (ref 70–180)

## 2024-07-05 NOTE — PROGRESS NOTES
I prescribed B1 for mildly low B1 level.  I called Idalia and left a message regarding the prescription.    Salazar Chan MD, FACS, [facility]

## 2024-07-25 ENCOUNTER — TELEPHONE (OUTPATIENT)
Dept: BARIATRICS/WEIGHT MGMT | Age: 44
End: 2024-07-25

## 2024-07-25 DIAGNOSIS — E66.09 CLASS 1 OBESITY DUE TO EXCESS CALORIES WITHOUT SERIOUS COMORBIDITY WITH BODY MASS INDEX (BMI) OF 31.0 TO 31.9 IN ADULT: Primary | ICD-10-CM

## 2024-07-25 NOTE — TELEPHONE ENCOUNTER
Received My Chart message from patient regarding oily stools. Call placed to patient. Patient denies bleeding. Patient states the oily stool drips down her leg like bleeding. Patient denies eating foods high in fat. Patient denies any change diet. Denies any recent antibiotic therapy. Patient describes her typical daily diet of eggs and low fat cream cheese for breakfast, Salad and fruit for lunch, and dinner of baked or grilled chicken. States snack consist of fruit (grapes, strawberries, watermelon, and sona), and protein bars. Patient states she occasionally  has rice.  Provided My Chart reply with education on foods high in fat causing oily stools due to fat malabsorption. Included information on sugar alcohols can also cause or contribute to diarrhea.

## 2024-10-01 ENCOUNTER — HOSPITAL ENCOUNTER (OUTPATIENT)
Age: 44
Setting detail: SPECIMEN
Discharge: HOME | End: 2024-10-01
Payer: COMMERCIAL

## 2024-10-01 ENCOUNTER — OFFICE VISIT (OUTPATIENT)
Dept: OBGYN CLINIC | Age: 44
End: 2024-10-01
Payer: COMMERCIAL

## 2024-10-01 VITALS — DIASTOLIC BLOOD PRESSURE: 66 MMHG | BODY MASS INDEX: 27.98 KG/M2 | WEIGHT: 153 LBS | SYSTOLIC BLOOD PRESSURE: 104 MMHG

## 2024-10-01 DIAGNOSIS — N89.8 VAGINAL DISCHARGE: ICD-10-CM

## 2024-10-01 DIAGNOSIS — N89.8 VAGINAL DISCHARGE: Primary | ICD-10-CM

## 2024-10-01 PROCEDURE — 87491 CHLMYD TRACH DNA AMP PROBE: CPT

## 2024-10-01 PROCEDURE — 87591 N.GONORRHOEAE DNA AMP PROB: CPT

## 2024-10-01 PROCEDURE — G8417 CALC BMI ABV UP PARAM F/U: HCPCS | Performed by: STUDENT IN AN ORGANIZED HEALTH CARE EDUCATION/TRAINING PROGRAM

## 2024-10-01 PROCEDURE — 1036F TOBACCO NON-USER: CPT | Performed by: STUDENT IN AN ORGANIZED HEALTH CARE EDUCATION/TRAINING PROGRAM

## 2024-10-01 PROCEDURE — G8484 FLU IMMUNIZE NO ADMIN: HCPCS | Performed by: STUDENT IN AN ORGANIZED HEALTH CARE EDUCATION/TRAINING PROGRAM

## 2024-10-01 PROCEDURE — 87210 SMEAR WET MOUNT SALINE/INK: CPT

## 2024-10-01 PROCEDURE — 99213 OFFICE O/P EST LOW 20 MIN: CPT | Performed by: STUDENT IN AN ORGANIZED HEALTH CARE EDUCATION/TRAINING PROGRAM

## 2024-10-01 PROCEDURE — G8427 DOCREV CUR MEDS BY ELIG CLIN: HCPCS | Performed by: STUDENT IN AN ORGANIZED HEALTH CARE EDUCATION/TRAINING PROGRAM

## 2024-10-01 PROCEDURE — 87808 TRICHOMONAS ASSAY W/OPTIC: CPT

## 2024-10-01 RX ORDER — TERCONAZOLE 0.4 %
CREAM WITH APPLICATOR VAGINAL
Qty: 45 G | Refills: 0 | Status: SHIPPED | OUTPATIENT
Start: 2024-10-01

## 2024-10-01 NOTE — PROGRESS NOTES
Idalia Sarmiento  10/1/2024              43 y.o.  Chief Complaint   Patient presents with    Vaginal Itching     Patient had been experiencing vaginal itching , and [facility] , they gave her a cream but she is still experiencing the itching and a thick discharge when she uses the bathroom                Primary Care Physician: Ronen Chakraborty PA  HPI:   Idalia Sarmiento is a 43 y.o. female  presents for evaluation of vaginal discharge.  [facility].  She reports provider did not obtain any swabs but prescribed her vaginal Flagyl.  She reports that she thinks she has a yeast infection because of the type of discharge that she describes as cottage cheeselike.  She reports history of yeast infections.  Although she does report history of trichomonas and BV as well.  She denies pelvic pain, fevers, chills.  She does not do well with oral medication due to history of a gastric bypass.  She is interested in STD screen today as well. She denies dysuria, urgency, frequency. Does have itching.       Past Medical History:   Diagnosis Date    Chiari I malformation (HCC)     Eczema     Migraines     PCO (polycystic ovaries)     S/P complete hysterectomy     Dr Dakotah Cervantes, atypical endocervical cells    Strain of musc/tend the rotator cuff of right shoulder, subs 2019    Type II diabetes mellitus, uncontrolled     Dr Benz      Past Surgical History:   Procedure Laterality Date    HYSTERECTOMY, TOTAL ABDOMINAL (CERVIX REMOVED)  2012    Dr Dakotah Cervantes, cervical atypia    LAPAROSCOPIC APPENDECTOMY      ARINA-EN-Y GASTRIC BYPASS N/A 2024    GASTRIC BYPASS ARINA-EN-Y LAPAROSCOPIC/ROBOTIC, modifier 22 performed by Jl Chan MD at [facility] OR    TUBAL LIGATION      UPPER GASTROINTESTINAL ENDOSCOPY N/A 2023    EGD BIOPSY performed by Jl Chan MD at [facility]     Family History   Problem Relation Age

## 2024-10-02 LAB
CLUE CELLS VAG QL WET PREP: NORMAL
T VAGINALIS VAG QL WET PREP: NORMAL
TRICHOMONAS VAGINALIS SCREEN: NEGATIVE
YEAST VAG QL WET PREP: NORMAL

## 2024-10-07 LAB
C TRACH DNA CVX QL NAA+PROBE: NORMAL
N GONORRHOEA DNA CERV MUCUS QL NAA+PROBE: NORMAL

## 2024-11-12 ENCOUNTER — HOSPITAL ENCOUNTER (OUTPATIENT)
Age: 44
Setting detail: SPECIMEN
Discharge: HOME | End: 2024-11-12
Payer: COMMERCIAL

## 2024-11-12 ENCOUNTER — OFFICE VISIT (OUTPATIENT)
Dept: OBGYN CLINIC | Age: 44
End: 2024-11-12
Payer: COMMERCIAL

## 2024-11-12 VITALS
BODY MASS INDEX: 27.97 KG/M2 | DIASTOLIC BLOOD PRESSURE: 84 MMHG | WEIGHT: 152 LBS | HEIGHT: 62 IN | SYSTOLIC BLOOD PRESSURE: 132 MMHG

## 2024-11-12 DIAGNOSIS — N89.8 VAGINAL ITCHING: Primary | ICD-10-CM

## 2024-11-12 DIAGNOSIS — N89.8 VAGINAL ITCHING: ICD-10-CM

## 2024-11-12 DIAGNOSIS — N89.8 VAGINAL DRYNESS: ICD-10-CM

## 2024-11-12 PROCEDURE — 99213 OFFICE O/P EST LOW 20 MIN: CPT | Performed by: STUDENT IN AN ORGANIZED HEALTH CARE EDUCATION/TRAINING PROGRAM

## 2024-11-12 PROCEDURE — 87210 SMEAR WET MOUNT SALINE/INK: CPT

## 2024-11-12 PROCEDURE — G8484 FLU IMMUNIZE NO ADMIN: HCPCS | Performed by: STUDENT IN AN ORGANIZED HEALTH CARE EDUCATION/TRAINING PROGRAM

## 2024-11-12 PROCEDURE — G8417 CALC BMI ABV UP PARAM F/U: HCPCS | Performed by: STUDENT IN AN ORGANIZED HEALTH CARE EDUCATION/TRAINING PROGRAM

## 2024-11-12 PROCEDURE — 1036F TOBACCO NON-USER: CPT | Performed by: STUDENT IN AN ORGANIZED HEALTH CARE EDUCATION/TRAINING PROGRAM

## 2024-11-12 PROCEDURE — 87808 TRICHOMONAS ASSAY W/OPTIC: CPT

## 2024-11-12 PROCEDURE — G8427 DOCREV CUR MEDS BY ELIG CLIN: HCPCS | Performed by: STUDENT IN AN ORGANIZED HEALTH CARE EDUCATION/TRAINING PROGRAM

## 2024-11-12 RX ORDER — CLOBETASOL PROPIONATE 0.5 MG/G
CREAM TOPICAL
Qty: 60 G | Refills: 3 | Status: SHIPPED | OUTPATIENT
Start: 2024-11-12

## 2024-11-12 RX ORDER — CLOTRIMAZOLE 1 %
CREAM WITH APPLICATOR VAGINAL
Qty: 45 G | Refills: 3 | Status: CANCELLED | OUTPATIENT
Start: 2024-11-12

## 2024-11-12 NOTE — PATIENT INSTRUCTIONS
Vulvovaginal Disorders:  An algorithm for basic adult diagnosis and treatment    GENERAL VULVAR CARE  Through the influence of family, culture, and marketing in the [place], women are “educated” that the vulva and vagina are dirty areas that must be scrupulously cleaned and kept dry and fragrant. Achievement of these mythical goals often causes irritative symptoms or adds to the impact of vulvovaginal problems. Nature knows best how to protect the vagina and vulva and keep them clean and healthy. They do not usually require more than plain water or water with a mild, hypoallergenic soap to stay “fresh.”    Is a shower preferable to a bath?  Either baths or showers are acceptable. Baths are not a risk for vaginal infection, but prolonged soaking in hot water dries the vulva, and is to be avoided.  Excessive washing is a leading cause of vulvar irritation, while lack of cleanliness is not a cause of irritative symptoms! Daily bathing or showering, once a day, is adequate. Gentle fingertip washing (avoiding cloths, sponges, or loofahs) is suggested. Scrubbing is never needed. A handheld shower is invaluable for any woman, especially when she is having difficulty reaching the anogenital area. A squirt bottle with warm water in it, used while sitting on the toilet, provides a quick bidet-like service.    Is soap necessary?  Soap is not considered necessary. Anti-bacterial soaps are not usually necessary. A safe soap is Dove® bar with 50% cold cream or Cetaphil lotion cleanser®. Ivory® is pure soap and therefore tends to cause drying. Soaps with essential oils such as lavender, though natural, can be irritating. Aloe, though natural, is also highly sensitizing to some women. Body washes contain multiple chemicals, with the potential for triggering contact dermatitis.    What is the best form of hair removal?  Pubic hair is there to cushion the mons and labia majora during sexual activity. Yet, many women choose

## 2024-11-12 NOTE — ASSESSMENT & PLAN NOTE
No obvious lesions or ulcerations noted to explain her ongoing itching.  Wet prep performed however discharge appears feel etiologic at her last wet prep in October was normal.  Discussed general vulva care and avoiding irritants such as scented soaps, detergents, clothing.  Patient courage to avoid douching if possible.  Discussed trialing a course of clobetasol and if no improvement can perform a biopsy to assess for vulvar dermatoses.  Encouraged to refrain from itching is much as possible as the itch scratch cycle only makes itching worse.  Will see her back after course of clobetasol to assess for improvement.    She was previously prescribed vaginal estrogen for vaginal dryness.  She is currently using vagisil which i discouraged as it can be an irritant.  We discussed Replens as an option for a vaginal moisturizer but I did encourage attempting trial of vaginal estrogen to see if this might show some improvement in her vaginal dryness.

## 2024-11-12 NOTE — PROGRESS NOTES
discharge appears feel etiologic at her last wet prep in October was normal.  Discussed general vulva care and avoiding irritants such as scented soaps, detergents, clothing.  Patient courage to avoid douching if possible.  Discussed trialing a course of clobetasol and if no improvement can perform a biopsy to assess for vulvar dermatoses.  Encouraged to refrain from itching is much as possible as the itch scratch cycle only makes itching worse.  Will see her back after course of clobetasol to assess for improvement.    She was previously prescribed vaginal estrogen for vaginal dryness.  She is currently using vagisil which i discouraged as it can be an irritant.  We discussed Replens as an option for a vaginal moisturizer but I did encourage attempting trial of vaginal estrogen to see if this might show some improvement in her vaginal dryness.  Orders:  -     Wet Prep, Genital; Future  2. Vaginal dryness  Assessment & Plan:  No obvious lesions or ulcerations noted to explain her ongoing itching.  Wet prep performed however discharge appears feel etiologic at her last wet prep in October was normal.  Discussed general vulva care and avoiding irritants such as scented soaps, detergents, clothing.  Patient courage to avoid douching if possible.  Discussed trialing a course of clobetasol and if no improvement can perform a biopsy to assess for vulvar dermatoses.  Encouraged to refrain from itching is much as possible as the itch scratch cycle only makes itching worse.  Will see her back after course of clobetasol to assess for improvement.    She was previously prescribed vaginal estrogen for vaginal dryness.  She is currently using vagisil which i discouraged as it can be an irritant.  We discussed Replens as an option for a vaginal moisturizer but I did encourage attempting trial of vaginal estrogen to see if this might show some improvement in her vaginal dryness.      Return in about 3 months (around 2/12/2025) for

## 2025-01-08 ENCOUNTER — OFFICE VISIT (OUTPATIENT)
Age: 45
End: 2025-01-08
Payer: COMMERCIAL

## 2025-01-08 VITALS
HEART RATE: 74 BPM | WEIGHT: 148 LBS | OXYGEN SATURATION: 98 % | BODY MASS INDEX: 27.23 KG/M2 | DIASTOLIC BLOOD PRESSURE: 66 MMHG | SYSTOLIC BLOOD PRESSURE: 110 MMHG | HEIGHT: 62 IN

## 2025-01-08 DIAGNOSIS — B99.9 INFECTED UMBILICAL GRANULOMA: Primary | ICD-10-CM

## 2025-01-08 DIAGNOSIS — Z12.11 ENCOUNTER FOR COLORECTAL CANCER SCREENING USING COLOGUARD TEST: ICD-10-CM

## 2025-01-08 DIAGNOSIS — R73.03 PRE-DIABETES: ICD-10-CM

## 2025-01-08 DIAGNOSIS — Z12.31 ENCOUNTER FOR SCREENING MAMMOGRAM FOR BREAST CANCER: ICD-10-CM

## 2025-01-08 DIAGNOSIS — Z12.12 ENCOUNTER FOR COLORECTAL CANCER SCREENING USING COLOGUARD TEST: ICD-10-CM

## 2025-01-08 DIAGNOSIS — R07.89 ATYPICAL CHEST PAIN: ICD-10-CM

## 2025-01-08 LAB — HBA1C MFR BLD: 4.6 %

## 2025-01-08 PROCEDURE — G8427 DOCREV CUR MEDS BY ELIG CLIN: HCPCS | Performed by: PHYSICIAN ASSISTANT

## 2025-01-08 PROCEDURE — 93000 ELECTROCARDIOGRAM COMPLETE: CPT | Performed by: PHYSICIAN ASSISTANT

## 2025-01-08 PROCEDURE — 1036F TOBACCO NON-USER: CPT | Performed by: PHYSICIAN ASSISTANT

## 2025-01-08 PROCEDURE — 99214 OFFICE O/P EST MOD 30 MIN: CPT | Performed by: PHYSICIAN ASSISTANT

## 2025-01-08 PROCEDURE — 83036 HEMOGLOBIN GLYCOSYLATED A1C: CPT | Performed by: PHYSICIAN ASSISTANT

## 2025-01-08 PROCEDURE — G8417 CALC BMI ABV UP PARAM F/U: HCPCS | Performed by: PHYSICIAN ASSISTANT

## 2025-01-08 PROCEDURE — M1308 PR FLU IMMUNIZE NO ADMIN: HCPCS | Performed by: PHYSICIAN ASSISTANT

## 2025-01-08 RX ORDER — GALCANEZUMAB 120 MG/ML
120 INJECTION, SOLUTION SUBCUTANEOUS
COMMUNITY
Start: 2025-01-02 | End: 2026-01-02

## 2025-01-08 RX ORDER — MUPIROCIN 20 MG/G
OINTMENT TOPICAL
Qty: 22 G | Refills: 0 | Status: SHIPPED | OUTPATIENT
Start: 2025-01-08 | End: 2025-01-18

## 2025-01-08 SDOH — ECONOMIC STABILITY: FOOD INSECURITY: WITHIN THE PAST 12 MONTHS, THE FOOD YOU BOUGHT JUST DIDN'T LAST AND YOU DIDN'T HAVE MONEY TO GET MORE.: NEVER TRUE

## 2025-01-08 SDOH — ECONOMIC STABILITY: FOOD INSECURITY: WITHIN THE PAST 12 MONTHS, YOU WORRIED THAT YOUR FOOD WOULD RUN OUT BEFORE YOU GOT MONEY TO BUY MORE.: NEVER TRUE

## 2025-01-08 ASSESSMENT — PATIENT HEALTH QUESTIONNAIRE - PHQ9
1. LITTLE INTEREST OR PLEASURE IN DOING THINGS: NOT AT ALL
SUM OF ALL RESPONSES TO PHQ QUESTIONS 1-9: 0
SUM OF ALL RESPONSES TO PHQ9 QUESTIONS 1 & 2: 0
SUM OF ALL RESPONSES TO PHQ QUESTIONS 1-9: 0
2. FEELING DOWN, DEPRESSED OR HOPELESS: NOT AT ALL
SUM OF ALL RESPONSES TO PHQ QUESTIONS 1-9: 0
SUM OF ALL RESPONSES TO PHQ QUESTIONS 1-9: 0

## 2025-01-08 NOTE — PROGRESS NOTES
Subjective  Idalia Sarmiento 1980 is a 44 y.o. female who presents today with:  Chief Complaint   Patient presents with    Other     Pt here today reports 2wks noticing a smell from bellybutton and discharge         HPI  History of Present Illness  The patient presents for evaluation of a foul odor from her belly button and chest pain.      She reports a new onset of a foul odor emanating from her umbilical region, which started approximately 2 weeks ago. She has not had any recent piercings. She does not experience any associated pain, itching, burning, or bleeding. Despite daily cleaning with soap and water, the odor persists. She has been using peroxide for cleaning, which results in minimal drainage followed by an unpleasant smell. Her last surgical intervention was a bariatric procedure. She has no known allergies to antibiotics and confirms that her medication regimen remains unchanged.    Chest Pain  She has been experiencing intermittent chest pain for the past month. The pain is described as sharp, lasts for about 5 minutes, and occurs every other day. It can occur during various activities such as walking at work or sitting at home. She does not experience any associated anxiety, depression, finger tingling, headaches, neck pain, dizziness, or flushing. She occasionally experiences heartburn but does not believe it is related to the chest pain. The pain does not interfere with her daily activities. She also does not feel like her heart is skipping a beat. She reports no recent changes or stressors.    ALLERGIES  The patient has no known allergies.      Review of Systems    Past Medical History:   Diagnosis Date    Chiari I malformation (HCC)     Eczema     Migraines 2010    PCO (polycystic ovaries)     S/P complete hysterectomy 2012    Dr Dakotah Cervantes, atypical endocervical cells    Strain of musc/tend the rotator cuff of right shoulder, subs 12/12/2019    Type II diabetes mellitus, uncontrolled

## 2025-02-07 ENCOUNTER — HOSPITAL ENCOUNTER (OUTPATIENT)
Dept: WOMENS IMAGING | Age: 45
Discharge: HOME | End: 2025-02-09
Payer: COMMERCIAL

## 2025-02-07 DIAGNOSIS — Z12.31 ENCOUNTER FOR SCREENING MAMMOGRAM FOR BREAST CANCER: ICD-10-CM

## 2025-02-07 PROCEDURE — 77063 BREAST TOMOSYNTHESIS BI: CPT

## 2025-02-24 ENCOUNTER — TELEPHONE (OUTPATIENT)
Age: 45
End: 2025-02-24

## 2025-03-07 ENCOUNTER — TELEPHONE (OUTPATIENT)
Dept: GASTROENTEROLOGY | Age: 45
End: 2025-03-07

## 2025-03-19 ENCOUNTER — OFFICE VISIT (OUTPATIENT)
Dept: BARIATRICS/WEIGHT MGMT | Age: 45
End: 2025-03-19

## 2025-03-19 ENCOUNTER — OFFICE VISIT (OUTPATIENT)
Dept: BARIATRICS/WEIGHT MGMT | Age: 45
End: 2025-03-19
Payer: COMMERCIAL

## 2025-03-19 VITALS
BODY MASS INDEX: 27.57 KG/M2 | SYSTOLIC BLOOD PRESSURE: 120 MMHG | HEART RATE: 78 BPM | DIASTOLIC BLOOD PRESSURE: 60 MMHG | WEIGHT: 149.8 LBS | HEIGHT: 62 IN | OXYGEN SATURATION: 96 %

## 2025-03-19 VITALS — HEIGHT: 62 IN | BODY MASS INDEX: 27.55 KG/M2 | WEIGHT: 149.69 LBS

## 2025-03-19 DIAGNOSIS — Z98.84 S/P GASTRIC BYPASS: ICD-10-CM

## 2025-03-19 DIAGNOSIS — E66.3 OVERWEIGHT (BMI 25.0-29.9): Primary | ICD-10-CM

## 2025-03-19 DIAGNOSIS — Z71.3 DIETARY COUNSELING AND SURVEILLANCE: ICD-10-CM

## 2025-03-19 DIAGNOSIS — Z98.84 BARIATRIC SURGERY STATUS: Primary | ICD-10-CM

## 2025-03-19 PROCEDURE — 97803 MED NUTRITION INDIV SUBSEQ: CPT | Performed by: DIETITIAN, REGISTERED

## 2025-03-19 PROCEDURE — G8417 CALC BMI ABV UP PARAM F/U: HCPCS | Performed by: DIETITIAN, REGISTERED

## 2025-03-19 PROCEDURE — G8428 CUR MEDS NOT DOCUMENT: HCPCS | Performed by: DIETITIAN, REGISTERED

## 2025-03-19 RX ORDER — D-METHORPHAN/PE/ACETAMINOPHEN 10-5-325MG
CAPSULE ORAL
COMMUNITY

## 2025-03-19 NOTE — PROGRESS NOTES
Medical Nutrition Therapy  [facility]    Patient here for her 1 year post op Geovanna en Y gastric bypass.         3/19/2025  Height:   Ht Readings from Last 1 Encounters:   03/19/25 1.575 m (5' 2\")     Weight:   Wt Readings from Last 1 Encounters:   03/19/25 67.9 kg (149 lb 12.8 oz)     BMI:   BMI Readings from Last 1 Encounters:   03/19/25 27.40 kg/m²       Pre-Surgery Weight: 214 lbs  TBW lost: 64 lbs  % EBW lost: 83 %    Patient has lost a total of 64 lbs from start of program.    Labs reviewed: N/A    Intolerances/Difficulties: none    Vitamins: Flinstones MVI, Citracal 650 mg twice daily, Vitamin C, iron, Vitamin D    Protein goal: met, 80 grams protein/day    Fluid goal: met, 80 oz/day     Exercise: gym 3 days/week, uses dumbbells daily    Nutrition Diagnosis:  PES: No nutrition diagnosis at this time     Nutrition Intervention:  Education provided to include review of post surgical diet progression, meal timing, protein and fluid intake, vitamins, and exercise.     Nutrition Monitoring and evaluation:  Protein intake: 75-90 grams/day (1.5-1.8gr/kg IBW)  Fluid intake: 64-90oz sugar free, calorie free, caffeine free, non carbonated beverages  Vitamins: Bariatric MVI with iron,  Calcium citrate 1000-1500mg/day in divided doses  Exercise: 45-60 minutes of exercise 5 days/week, including 3 days of strength training    Plan/Recommendation:   Continue current intake and exercise  Switch to chewable bariatric MVI with iron instead of flinstones, chewable calcium citrate (1 chew twice daily)  Labs today    Time spent with patient 15 minutes    Follow up per program protocol    Joyce Gonsalez RD

## 2025-03-19 NOTE — PATIENT INSTRUCTIONS
Follow up in 3 months for your 12 month post op visit.    Obtain labs before follow up appointment in 6 months.    THE BIG FOUR GUIDELINES:    1.  Count calories!  People who count calories eat less.  Use the daily plate or other apps for your smart phone or computer.  The more you count the more of an expert you will become and will get easier and easier.  If you are trying to lose weight and exercising a lot, keep calories to around the thousand to 1200 a day.  If you are not exercising consistently try to limit them to around 800 a day.    2.  Separate liquids and solids.  Wait 30 minutes to an hour after you eat before drinking liquids.  This will reduce the total amount of food you eat in the meal.    3.  Exercise!  Optimally,  up to 5 hours a week with a combination of cardio and resistance or weight training will dramatically help to keep excess body fat off.  But any bit of exercise makes a big, long-term difference.    4.  Sleep!   Try to get 7 or more hours of sleep a night.  If you have obstructive sleep apnea definitely use your CPAP machine.    THE RULE OF 20'S:  For every meal, chew each bite 20 seconds.  Then put the fork down and wait 20 seconds after you swallow before picking up the fork again.  Each meal should take at least 20 minutes so your brain can register that you are full.    If you are happy with your care, please go  to [email or web address]  and/or [email or web address] and leave a review for Jl Chan MD.  The charitable nature of our practice makes a marketing budget a challenge and your review could help us help more people.

## 2025-03-19 NOTE — PROGRESS NOTES
clubbing cyanosis or edema.  Neurologic: No gross motor or sensory defects.        Assessment and Plan:      Idalia Sarmiento   is 9 months post-op from Robotic Geovanna en Y gastric bypass.  and doing well.  Follow up in 3 months with labs.      Pre-Surgery Weight: 214 lbs  TBW lost: 64 lbs  % EBW lost: 83 %     Patient has lost a total of 64 lbs from start of program.    In addition to the plan above, we discussed the following:    THE BIG FOUR GUIDELINES:  1.  Count calories!  People count calories eat less.  Use the daily plate or other apps for your smart phone or computer.  The more you count the more of an expert you will become and will get easier and easier.  If you are trying to lose weight and exercising a lot, keep calories to around the thousand to 1200 a day.  If you are not exercising consistently try to limit them to around 800 a day.    2.  Separate liquids and solids.  Wait 30 minutes to an hour after you eat before drinking liquids.  This will reduce the total amount of food you eat in the meal.    3.  Exercise!  You need up to 5 hours a week with a combination of cardio and resistance or weight training in order to keep excess body fat off.    4.  Sleep!   Try to get 7 or more hours of sleep a night.  If you have obstructive sleep apnea definitely use your CPAP machine.    THE RULE OF 20'S:  For every meal, chew each bite 20 seconds.  Then put the fork down and wait 20 seconds after you swallow before picking up the fork again.  Each meal should take at least 20 minutes so your brain can register that you are full.      More than 40 minutes were spent in face-to-face interaction with patient majority of which were spent in care coordination of the above.      Salazar Chan MD, FACS, [facility]

## 2025-03-26 RX ORDER — ESTRADIOL 0.1 MG/G
CREAM VAGINAL
Qty: 42.5 G | Refills: 3 | Status: SHIPPED | OUTPATIENT
Start: 2025-03-26

## 2025-04-01 ENCOUNTER — OFFICE VISIT (OUTPATIENT)
Dept: OBGYN CLINIC | Age: 45
End: 2025-04-01
Payer: COMMERCIAL

## 2025-04-01 ENCOUNTER — HOSPITAL ENCOUNTER (OUTPATIENT)
Age: 45
Setting detail: SPECIMEN
Discharge: HOME | End: 2025-04-01

## 2025-04-01 VITALS
BODY MASS INDEX: 27.62 KG/M2 | SYSTOLIC BLOOD PRESSURE: 108 MMHG | HEART RATE: 74 BPM | OXYGEN SATURATION: 98 % | WEIGHT: 151 LBS | DIASTOLIC BLOOD PRESSURE: 60 MMHG

## 2025-04-01 DIAGNOSIS — L29.2 VULVAR ITCHING: ICD-10-CM

## 2025-04-01 DIAGNOSIS — N89.8 VAGINAL DRYNESS: ICD-10-CM

## 2025-04-01 DIAGNOSIS — L29.2 VULVAR ITCHING: Primary | ICD-10-CM

## 2025-04-01 PROCEDURE — G8427 DOCREV CUR MEDS BY ELIG CLIN: HCPCS | Performed by: STUDENT IN AN ORGANIZED HEALTH CARE EDUCATION/TRAINING PROGRAM

## 2025-04-01 PROCEDURE — 11104 PUNCH BX SKIN SINGLE LESION: CPT | Performed by: STUDENT IN AN ORGANIZED HEALTH CARE EDUCATION/TRAINING PROGRAM

## 2025-04-01 PROCEDURE — 99213 OFFICE O/P EST LOW 20 MIN: CPT | Performed by: STUDENT IN AN ORGANIZED HEALTH CARE EDUCATION/TRAINING PROGRAM

## 2025-04-01 PROCEDURE — G8417 CALC BMI ABV UP PARAM F/U: HCPCS | Performed by: STUDENT IN AN ORGANIZED HEALTH CARE EDUCATION/TRAINING PROGRAM

## 2025-04-01 PROCEDURE — 1036F TOBACCO NON-USER: CPT | Performed by: STUDENT IN AN ORGANIZED HEALTH CARE EDUCATION/TRAINING PROGRAM

## 2025-04-01 RX ORDER — ESTRADIOL 0.1 MG/G
CREAM VAGINAL
Qty: 42.5 G | Refills: 3 | Status: SHIPPED | OUTPATIENT
Start: 2025-04-01

## 2025-04-01 ASSESSMENT — ENCOUNTER SYMPTOMS: ABDOMINAL PAIN: 0

## 2025-04-01 NOTE — PROGRESS NOTES
Idalia Sarmiento  4/1/2025              44 y.o.  Chief Complaint   Patient presents with    Other     Recheck for the dryness and itching from last time.         No LMP recorded (lmp unknown). Patient has had a hysterectomy.           Primary CarePhysician: Ronen Chakraborty PA  History of Present Illness  The patient presents for evaluation of vaginal dryness and itching.    She reports persistent vaginal dryness, which she has been managing with vaginal estrogen cream. The cream has been beneficial, but she has exhausted her supply and requires a refill. She was applying the cream once weekly.    Additionally, she experiences severe external itching, which is alleviated upon application of clobetasol cream. She applies the clobetasol cream daily and finds it significantly improves her symptoms. She does not report any specific area of increased irritation. She has not used any other vaginal treatments apart from the estrogen cream.    MEDICATIONS  Current: Vaginal estrogen cream, clobetasol    Results        Review of Systems:       Review of Systems   Constitutional:  Negative for chills and fever.   Gastrointestinal:  Negative for abdominal pain.   Genitourinary:  Negative for vaginal bleeding and vaginal discharge.        Vulvar itching  Vaginal dryness       Physical Exam:    /60   Pulse 74   Wt 68.5 kg (151 lb)   LMP  (LMP Unknown) Comment: 2012  SpO2 98%   BMI 27.62 kg/m²      Physical Exam  Constitutional:       General: She is not in acute distress.     Appearance: Normal appearance. She is not ill-appearing, toxic-appearing or diaphoretic.   HENT:      Head: Normocephalic.   Eyes:      Extraocular Movements: Extraocular movements intact.   Cardiovascular:      Rate and Rhythm: Normal rate.   Pulmonary:      Effort: Pulmonary effort is normal. No respiratory distress.   Abdominal:      General: Abdomen is flat.   Genitourinary:         Comments: No lichenification appreciated. Normal

## 2025-04-15 ENCOUNTER — RESULTS FOLLOW-UP (OUTPATIENT)
Dept: OBGYN | Age: 45
End: 2025-04-15

## 2025-05-22 DIAGNOSIS — Z98.84 BARIATRIC SURGERY STATUS: ICD-10-CM

## 2025-05-22 LAB
ALBUMIN SERPL-MCNC: 3.9 G/DL (ref 3.5–4.6)
ERYTHROCYTE [DISTWIDTH] IN BLOOD BY AUTOMATED COUNT: 13.2 % (ref 11.5–14.5)
ESTIMATED AVERAGE GLUCOSE: 88 MG/DL
HBA1C MFR BLD: 4.7 % (ref 4–6)
HCT VFR BLD AUTO: 38.6 % (ref 37–47)
HGB BLD-MCNC: 12.7 G/DL (ref 12–16)
IRON % SATURATION: 32 % (ref 20–55)
IRON: 87 UG/DL (ref 37–145)
MCH RBC QN AUTO: 30.7 PG (ref 27–31.3)
MCHC RBC AUTO-ENTMCNC: 32.9 % (ref 33–37)
MCV RBC AUTO: 93.2 FL (ref 79.4–94.8)
PLATELET # BLD AUTO: 282 K/UL (ref 130–400)
PTH-INTACT SERPL-MCNC: 172.1 PG/ML (ref 15–65)
RBC # BLD AUTO: 4.14 M/UL (ref 4.2–5.4)
TOTAL IRON BINDING CAPACITY: 275 UG/DL (ref 250–450)
UNSATURATED IRON BINDING CAPACITY: 188 UG/DL (ref 112–347)
VITAMIN B-12: 354 PG/ML (ref 232–1245)
VITAMIN D 25-HYDROXY: <6 NG/ML (ref 30–100)
WBC # BLD AUTO: 6.2 K/UL (ref 4.8–10.8)

## 2025-05-24 LAB
CA-I ADJ PH7.4 SERPL-SCNC: 1.26 MMOL/L (ref 1.09–1.3)
CA-I SERPL ISE-SCNC: 1.21 MMOL/L (ref 1.09–1.3)

## 2025-05-25 LAB — VIT B1 BLD-MCNC: 94 NMOL/L (ref 70–180)

## 2025-07-08 NOTE — PROGRESS NOTES
Well Adult Note  Name: Idalia Sarmiento Today’s Date: 2025   MRN: [id number] Sex: Female   Age: 44 y.o. Ethnicity: Non- / Non    : 1980 Race: Black /       Idalia Sarmiento is here for a well adult exam.  Chief Complaint   Patient presents with    Follow-up     6 month follow up no complaints          Subjective   History:  History of Present Illness  The patient presents for concerns regarding weight loss, low blood pressure, and vitamin D deficiency.    She has been successful in her weight loss journey and is currently maintaining her weight. Her exercise routine includes working out three times a week. She reports no issues with food intake or digestion and maintains hydration by drinking water daily.    She has experienced low blood pressure, which is a new occurrence. She does not experience lightheadedness or dizziness upon standing but occasionally feels dizzy when turning in bed. She also reports shortness of breath when climbing stairs, but this symptom has improved. She attributes her low blood pressure to skipping breakfast this morning.    She is prescribed iron, vitamin D, and vitamin C supplements but reports not taking them regularly. She has difficulty swallowing pills post-surgery and prefers gummy vitamins. She reports feeling fatigued and lacking energy.    SOCIAL HISTORY  She does not smoke.        Review of Systems   Constitutional:  Negative for activity change, appetite change, chills and fever.   HENT:  Negative for congestion, drooling, sinus pressure, sinus pain and sore throat.    Eyes:  Negative for visual disturbance.   Respiratory:  Negative for cough, chest tightness and shortness of breath.    Cardiovascular:  Negative for chest pain.   Gastrointestinal:  Negative for abdominal pain, diarrhea, nausea and vomiting.   Endocrine: Negative for cold intolerance.   Genitourinary:  Negative for dysuria, flank pain, frequency and hematuria.

## 2025-07-09 ENCOUNTER — OFFICE VISIT (OUTPATIENT)
Age: 45
End: 2025-07-09
Payer: COMMERCIAL

## 2025-07-09 VITALS
SYSTOLIC BLOOD PRESSURE: 82 MMHG | HEART RATE: 73 BPM | RESPIRATION RATE: 16 BRPM | DIASTOLIC BLOOD PRESSURE: 60 MMHG | OXYGEN SATURATION: 91 % | BODY MASS INDEX: 26.74 KG/M2 | WEIGHT: 146.2 LBS

## 2025-07-09 DIAGNOSIS — E55.9 VITAMIN D DEFICIENCY: ICD-10-CM

## 2025-07-09 DIAGNOSIS — Z00.00 ENCOUNTER FOR WELL ADULT EXAM WITHOUT ABNORMAL FINDINGS: Primary | ICD-10-CM

## 2025-07-09 DIAGNOSIS — Z98.84 HISTORY OF ROUX-EN-Y GASTRIC BYPASS: ICD-10-CM

## 2025-07-09 PROCEDURE — G8427 DOCREV CUR MEDS BY ELIG CLIN: HCPCS | Performed by: PHYSICIAN ASSISTANT

## 2025-07-09 PROCEDURE — 1036F TOBACCO NON-USER: CPT | Performed by: PHYSICIAN ASSISTANT

## 2025-07-09 PROCEDURE — G8417 CALC BMI ABV UP PARAM F/U: HCPCS | Performed by: PHYSICIAN ASSISTANT

## 2025-07-09 PROCEDURE — 99213 OFFICE O/P EST LOW 20 MIN: CPT | Performed by: PHYSICIAN ASSISTANT

## 2025-07-09 RX ORDER — ERGOCALCIFEROL 1.25 MG/1
50000 CAPSULE, LIQUID FILLED ORAL WEEKLY
Qty: 4 CAPSULE | Refills: 5 | Status: SHIPPED | OUTPATIENT
Start: 2025-07-09

## 2025-07-09 ASSESSMENT — ENCOUNTER SYMPTOMS
DIARRHEA: 0
ABDOMINAL PAIN: 0
CHEST TIGHTNESS: 0
SINUS PAIN: 0
NAUSEA: 0
COUGH: 0
VOMITING: 0
SINUS PRESSURE: 0
SHORTNESS OF BREATH: 0
BACK PAIN: 0
SORE THROAT: 0

## 2025-07-09 ASSESSMENT — VISUAL ACUITY: OU: 1

## 2025-07-17 ENCOUNTER — TELEPHONE (OUTPATIENT)
Age: 45
End: 2025-07-17

## 2025-07-17 NOTE — TELEPHONE ENCOUNTER
Pt will need to come in for a Lab visit to do TB PPD testing or if her place of employment will accept a blood test the provider can order this instead.

## 2025-07-21 ENCOUNTER — CLINICAL SUPPORT (OUTPATIENT)
Age: 45
End: 2025-07-21

## 2025-07-21 DIAGNOSIS — Z11.1 ENCOUNTER FOR PPD TEST: Primary | ICD-10-CM

## 2025-07-21 NOTE — PROGRESS NOTES
After obtaining consent, and per orders of Dr. RALPH Chakraborty, injection of TB PPD given in Left arm by Sarita Romero MA. Patient instructed to remain in clinic for 20 minutes afterwards, and to report any adverse reaction to me immediately.

## 2025-07-23 ENCOUNTER — CLINICAL SUPPORT (OUTPATIENT)
Age: 45
End: 2025-07-23

## 2025-07-23 NOTE — PROGRESS NOTES
PPD Reading Note  PPD read and results entered in [facility].  Result: 0 mm induration.  Interpretation: negative  If test not read within 48-72 hours of initial placement, patient advised to repeat in other arm 1-3 weeks after this test.  Allergic reaction: no

## (undated) DEVICE — VESSEL SEALER EXTEND: Brand: ENDOWRIST

## (undated) DEVICE — SYRINGE MED 30ML STD CLR PLAS LUERLOCK TIP N CTRL DISP

## (undated) DEVICE — REDUCER: Brand: ENDOWRIST

## (undated) DEVICE — RESERVOIR,SUCTION,100CC,SILICONE: Brand: MEDLINE

## (undated) DEVICE — ELECTRO LUBE IS A SINGLE PATIENT USE DEVICE THAT IS INTENDED TO BE USED ON ELECTROSURGICAL ELECTRODES TO REDUCE STICKING.: Brand: KEY SURGICAL ELECTRO LUBE

## (undated) DEVICE — ARM DRAPE

## (undated) DEVICE — SOLUTION ANTIFOG VIS SYS CLEARIFY LAPSCP

## (undated) DEVICE — TUBING INSUFFLATION SMK EVAC HI FLO SET PNEUMOCLEAR

## (undated) DEVICE — BLADELESS OBTURATOR: Brand: WECK VISTA

## (undated) DEVICE — DRAIN SURG 19FR 0.25IN SIL RND W/ TRCR INDIC DOT RADPQ FULL

## (undated) DEVICE — SUCTION IRRIGATOR: Brand: ENDOWRIST

## (undated) DEVICE — STAPLER 60 RELOAD BLUE: Brand: SUREFORM

## (undated) DEVICE — SHEET,DRAPE,53X77,STERILE: Brand: MEDLINE

## (undated) DEVICE — SPONGE,LAP,18"X18",DLX,XR,ST,5/PK,40/PK: Brand: MEDLINE

## (undated) DEVICE — MARKER SURG SKIN GENTIAN VLT REG TIP W/ 6IN RUL DYNJSM01

## (undated) DEVICE — SINGLE PORT MANIFOLD: Brand: NEPTUNE 2

## (undated) DEVICE — TOWEL,OR,DSP,ST,BLUE,STD,4/PK,20PK/CS: Brand: MEDLINE

## (undated) DEVICE — ELECTRODE PT RET AD L9FT HI MOIST COND ADH HYDRGEL CORDED

## (undated) DEVICE — SUTURE ABSORBABLE MONOFILAMENT 2-0 SH 6 IN STRATAFIX SPRL SXPP1B415

## (undated) DEVICE — SUTURE NONABSORBABLE MONOFILAMENT 3-0 PS-1 18 IN BLK ETHILON 1663H

## (undated) DEVICE — STAPLER 60: Brand: SUREFORM

## (undated) DEVICE — SUTURE SZ 0 27IN 5/8 CIR UR-6  TAPER PT VIOLET ABSRB VICRYL J603H

## (undated) DEVICE — COLUMN DRAPE

## (undated) DEVICE — LABEL MED MINI W/ MARKER

## (undated) DEVICE — SEAL

## (undated) DEVICE — BLADE,CARBON-STEEL,11,STRL,DISPOSABLE,TB: Brand: MEDLINE

## (undated) DEVICE — Device

## (undated) DEVICE — DRAPE,UTILITY,TAPE,15X26,STERILE: Brand: MEDLINE

## (undated) DEVICE — VISIGI 3D®  CALIBRATION SYSTEM  SIZE 40FR STD W/ BULB: Brand: BOEHRINGER® VISIGI 3D™ SLEEVE GASTRECTOMY CALIBRATION SYSTEM, SIZE 40FR W/BULB

## (undated) DEVICE — BANDAGE ADH W2XL4IN NITRL FAB STRP CURAD

## (undated) DEVICE — APPLICATOR MEDICATED 26 CC SOLUTION HI LT ORNG CHLORAPREP

## (undated) DEVICE — TIP COVER ACCESSORY

## (undated) DEVICE — SEALANT TISS 4ML FIBRIN PREFIL SYR PRIMA FRZN W/ 1 DUPLOJET

## (undated) DEVICE — COUNTER NDL 40 COUNT HLD 70 FOAM BLK ADH W/ MAG

## (undated) DEVICE — APPLICATOR MEDICATED 5 MMX40 CM 2 LUMEN W/ SNAP LCK DUPLOTIP

## (undated) DEVICE — GAUZE,SPONGE,4"X4",16PLY,XRAY,STRL,LF: Brand: MEDLINE

## (undated) DEVICE — TTL1LYR 16FR10ML 100%SIL TMPST TR: Brand: MEDLINE

## (undated) DEVICE — GLOVE ORANGE PI 8 1/2   MSG9085

## (undated) DEVICE — STAPLER 60 RELOAD WHITE: Brand: SUREFORM

## (undated) DEVICE — PACK,BASIC: Brand: MEDLINE

## (undated) DEVICE — GOWN,AURORA,NONREINFORCED,LARGE: Brand: MEDLINE

## (undated) DEVICE — DRAPE,LAP,CHOLE,W/TROUGHS,STERILE: Brand: MEDLINE